# Patient Record
Sex: FEMALE | Race: WHITE | NOT HISPANIC OR LATINO | Employment: FULL TIME | URBAN - METROPOLITAN AREA
[De-identification: names, ages, dates, MRNs, and addresses within clinical notes are randomized per-mention and may not be internally consistent; named-entity substitution may affect disease eponyms.]

---

## 2018-05-24 ENCOUNTER — HOSPITAL ENCOUNTER (EMERGENCY)
Facility: HOSPITAL | Age: 28
Discharge: HOME | End: 2018-05-24
Attending: EMERGENCY MEDICINE | Admitting: EMERGENCY MEDICINE

## 2018-05-24 ENCOUNTER — APPOINTMENT (OUTPATIENT)
Dept: ULTRASOUND IMAGING | Facility: HOSPITAL | Age: 28
End: 2018-05-24

## 2018-05-24 ENCOUNTER — APPOINTMENT (OUTPATIENT)
Dept: CT IMAGING | Facility: HOSPITAL | Age: 28
End: 2018-05-24

## 2018-05-24 VITALS
OXYGEN SATURATION: 98 % | WEIGHT: 180 LBS | HEIGHT: 69 IN | HEART RATE: 74 BPM | DIASTOLIC BLOOD PRESSURE: 84 MMHG | SYSTOLIC BLOOD PRESSURE: 117 MMHG | RESPIRATION RATE: 16 BRPM | BODY MASS INDEX: 26.66 KG/M2 | TEMPERATURE: 98.2 F

## 2018-05-24 DIAGNOSIS — R93.5 ABNORMAL ULTRASOUND OF OVARY: Primary | ICD-10-CM

## 2018-05-24 DIAGNOSIS — R10.31 RIGHT LOWER QUADRANT ABDOMINAL PAIN: ICD-10-CM

## 2018-05-24 LAB
ALBUMIN SERPL-MCNC: 4.3 G/DL (ref 3.5–5.2)
ALBUMIN/GLOB SERPL: 1.3 G/DL
ALP SERPL-CCNC: 103 U/L (ref 39–117)
ALT SERPL W P-5'-P-CCNC: 17 U/L (ref 1–33)
ANION GAP SERPL CALCULATED.3IONS-SCNC: 13.7 MMOL/L
AST SERPL-CCNC: 20 U/L (ref 1–32)
BASOPHILS # BLD AUTO: 0.02 10*3/MM3 (ref 0–0.2)
BASOPHILS NFR BLD AUTO: 0.4 % (ref 0–1.5)
BILIRUB SERPL-MCNC: 0.2 MG/DL (ref 0.1–1.2)
BILIRUB UR QL STRIP: NEGATIVE
BUN BLD-MCNC: 13 MG/DL (ref 6–20)
BUN/CREAT SERPL: 16.3 (ref 7–25)
CALCIUM SPEC-SCNC: 9 MG/DL (ref 8.6–10.5)
CHLORIDE SERPL-SCNC: 101 MMOL/L (ref 98–107)
CLARITY UR: ABNORMAL
CLUE CELLS SPEC QL WET PREP: ABNORMAL
CO2 SERPL-SCNC: 26.3 MMOL/L (ref 22–29)
COLOR UR: YELLOW
CREAT BLD-MCNC: 0.8 MG/DL (ref 0.57–1)
DEPRECATED RDW RBC AUTO: 45.8 FL (ref 37–54)
EOSINOPHIL # BLD AUTO: 0.03 10*3/MM3 (ref 0–0.7)
EOSINOPHIL NFR BLD AUTO: 0.5 % (ref 0.3–6.2)
ERYTHROCYTE [DISTWIDTH] IN BLOOD BY AUTOMATED COUNT: 14.7 % (ref 11.7–13)
GFR SERPL CREATININE-BSD FRML MDRD: 85 ML/MIN/1.73
GLOBULIN UR ELPH-MCNC: 3.3 GM/DL
GLUCOSE BLD-MCNC: 106 MG/DL (ref 65–99)
GLUCOSE UR STRIP-MCNC: NEGATIVE MG/DL
HCG SERPL QL: NEGATIVE
HCT VFR BLD AUTO: 34.3 % (ref 35.6–45.5)
HGB BLD-MCNC: 10.3 G/DL (ref 11.9–15.5)
HGB UR QL STRIP.AUTO: NEGATIVE
HOLD SPECIMEN: NORMAL
HOLD SPECIMEN: NORMAL
HYDATID CYST SPEC WET PREP: ABNORMAL
IMM GRANULOCYTES # BLD: 0.02 10*3/MM3 (ref 0–0.03)
IMM GRANULOCYTES NFR BLD: 0.4 % (ref 0–0.5)
KETONES UR QL STRIP: NEGATIVE
KOH PREP NAIL: NORMAL
LEUKOCYTE ESTERASE UR QL STRIP.AUTO: NEGATIVE
LYMPHOCYTES # BLD AUTO: 0.64 10*3/MM3 (ref 0.9–4.8)
LYMPHOCYTES NFR BLD AUTO: 11.3 % (ref 19.6–45.3)
MCH RBC QN AUTO: 25.6 PG (ref 26.9–32)
MCHC RBC AUTO-ENTMCNC: 30 G/DL (ref 32.4–36.3)
MCV RBC AUTO: 85.3 FL (ref 80.5–98.2)
MONOCYTES # BLD AUTO: 0.24 10*3/MM3 (ref 0.2–1.2)
MONOCYTES NFR BLD AUTO: 4.2 % (ref 5–12)
NEUTROPHILS # BLD AUTO: 4.73 10*3/MM3 (ref 1.9–8.1)
NEUTROPHILS NFR BLD AUTO: 83.2 % (ref 42.7–76)
NITRITE UR QL STRIP: NEGATIVE
PH UR STRIP.AUTO: 5.5 [PH] (ref 5–8)
PLATELET # BLD AUTO: 298 10*3/MM3 (ref 140–500)
PMV BLD AUTO: 10.1 FL (ref 6–12)
POTASSIUM BLD-SCNC: 3.7 MMOL/L (ref 3.5–5.2)
PROT SERPL-MCNC: 7.6 G/DL (ref 6–8.5)
PROT UR QL STRIP: NEGATIVE
RBC # BLD AUTO: 4.02 10*6/MM3 (ref 3.9–5.2)
SODIUM BLD-SCNC: 141 MMOL/L (ref 136–145)
SP GR UR STRIP: 1.01 (ref 1–1.03)
T VAGINALIS SPEC QL WET PREP: ABNORMAL
UROBILINOGEN UR QL STRIP: ABNORMAL
WBC NRBC COR # BLD: 5.68 10*3/MM3 (ref 4.5–10.7)
WBC SPEC QL WET PREP: ABNORMAL
WHOLE BLOOD HOLD SPECIMEN: NORMAL
WHOLE BLOOD HOLD SPECIMEN: NORMAL
YEAST GENITAL QL WET PREP: ABNORMAL

## 2018-05-24 PROCEDURE — 76830 TRANSVAGINAL US NON-OB: CPT

## 2018-05-24 PROCEDURE — 76856 US EXAM PELVIC COMPLETE: CPT

## 2018-05-24 PROCEDURE — 87220 TISSUE EXAM FOR FUNGI: CPT | Performed by: NURSE PRACTITIONER

## 2018-05-24 PROCEDURE — 81003 URINALYSIS AUTO W/O SCOPE: CPT | Performed by: NURSE PRACTITIONER

## 2018-05-24 PROCEDURE — 25010000002 ONDANSETRON PER 1 MG: Performed by: NURSE PRACTITIONER

## 2018-05-24 PROCEDURE — 87491 CHLMYD TRACH DNA AMP PROBE: CPT | Performed by: NURSE PRACTITIONER

## 2018-05-24 PROCEDURE — 96375 TX/PRO/DX INJ NEW DRUG ADDON: CPT

## 2018-05-24 PROCEDURE — 80053 COMPREHEN METABOLIC PANEL: CPT | Performed by: EMERGENCY MEDICINE

## 2018-05-24 PROCEDURE — 96365 THER/PROPH/DIAG IV INF INIT: CPT

## 2018-05-24 PROCEDURE — 99285 EMERGENCY DEPT VISIT HI MDM: CPT

## 2018-05-24 PROCEDURE — 85025 COMPLETE CBC W/AUTO DIFF WBC: CPT | Performed by: EMERGENCY MEDICINE

## 2018-05-24 PROCEDURE — 93976 VASCULAR STUDY: CPT

## 2018-05-24 PROCEDURE — 87591 N.GONORRHOEAE DNA AMP PROB: CPT | Performed by: NURSE PRACTITIONER

## 2018-05-24 PROCEDURE — 25010000002 IOPAMIDOL 61 % SOLUTION: Performed by: NURSE PRACTITIONER

## 2018-05-24 PROCEDURE — 87210 SMEAR WET MOUNT SALINE/INK: CPT | Performed by: NURSE PRACTITIONER

## 2018-05-24 PROCEDURE — 25010000002 HYDROMORPHONE HCL PF 500 MG/50ML SOLUTION: Performed by: NURSE PRACTITIONER

## 2018-05-24 PROCEDURE — 25010000002 CEFTRIAXONE PER 250 MG: Performed by: NURSE PRACTITIONER

## 2018-05-24 PROCEDURE — 74177 CT ABD & PELVIS W/CONTRAST: CPT

## 2018-05-24 PROCEDURE — 96361 HYDRATE IV INFUSION ADD-ON: CPT

## 2018-05-24 PROCEDURE — 84703 CHORIONIC GONADOTROPIN ASSAY: CPT | Performed by: EMERGENCY MEDICINE

## 2018-05-24 RX ORDER — SODIUM CHLORIDE 0.9 % (FLUSH) 0.9 %
10 SYRINGE (ML) INJECTION AS NEEDED
Status: DISCONTINUED | OUTPATIENT
Start: 2018-05-24 | End: 2018-05-24 | Stop reason: HOSPADM

## 2018-05-24 RX ORDER — ALBUTEROL SULFATE 2.5 MG/3ML
2.5 SOLUTION RESPIRATORY (INHALATION) ONCE
Status: DISCONTINUED | OUTPATIENT
Start: 2018-05-24 | End: 2018-05-24 | Stop reason: HOSPADM

## 2018-05-24 RX ORDER — CEFTRIAXONE SODIUM 1 G/50ML
1 INJECTION, SOLUTION INTRAVENOUS ONCE
Status: COMPLETED | OUTPATIENT
Start: 2018-05-24 | End: 2018-05-24

## 2018-05-24 RX ORDER — AZITHROMYCIN 250 MG/1
1000 TABLET, FILM COATED ORAL ONCE
Status: COMPLETED | OUTPATIENT
Start: 2018-05-24 | End: 2018-05-24

## 2018-05-24 RX ORDER — ONDANSETRON 2 MG/ML
4 INJECTION INTRAMUSCULAR; INTRAVENOUS ONCE
Status: COMPLETED | OUTPATIENT
Start: 2018-05-24 | End: 2018-05-24

## 2018-05-24 RX ORDER — DOXYCYCLINE 100 MG/1
100 CAPSULE ORAL 2 TIMES DAILY
Qty: 28 CAPSULE | Refills: 0 | Status: SHIPPED | OUTPATIENT
Start: 2018-05-24 | End: 2018-06-07

## 2018-05-24 RX ADMIN — AZITHROMYCIN 1000 MG: 250 TABLET, FILM COATED ORAL at 14:28

## 2018-05-24 RX ADMIN — ONDANSETRON 4 MG: 2 INJECTION INTRAMUSCULAR; INTRAVENOUS at 11:32

## 2018-05-24 RX ADMIN — SODIUM CHLORIDE 1000 ML: 9 INJECTION, SOLUTION INTRAVENOUS at 11:32

## 2018-05-24 RX ADMIN — CEFTRIAXONE SODIUM 1 G: 1 INJECTION, SOLUTION INTRAVENOUS at 14:29

## 2018-05-24 RX ADMIN — IOPAMIDOL 85 ML: 612 INJECTION, SOLUTION INTRAVENOUS at 11:54

## 2018-05-24 RX ADMIN — HYDROMORPHONE HYDROCHLORIDE 1 MG: 10 INJECTION INTRAMUSCULAR; INTRAVENOUS; SUBCUTANEOUS at 11:33

## 2018-05-24 NOTE — ED PROVIDER NOTES
EMERGENCY DEPARTMENT ENCOUNTER    Room Number:  41/41  Date seen:  5/24/2018  Time seen: 10:28 AM  PCP: Chano Ortiz MD   Gastroenterologist- Dr Bowser   History provided by- patient    HPI:  Chief complaint: abdominal pain  Context:Doreen Stephenson is a 28 y.o. female who states that she has had Crohn's disease since age 19 yoa and underwent reversal of her ileostomy in 12/2017. She presents to the ED with c/o intermittent diffuse abd pain (worse to right abdomen) that started today. It has no aggravating factors and no alleviating factors. She has also had nausea. She denies vomiting, diarrhea, pain and difficulty with urination, fevers, chills, chest pain, and dyspnea. She reports that her sx are similar to previous exacerbations of Crohn's disease. Additionally, she states that her last bowel movement occurred last night and was normal. She notes that she is sexually active with a single partner and is not on contraceptive. She also has hx of abdominal hernia and SBO. LMP occurred in early 5/2018. Pt has no other complaints at this time.    Timing: intermittent  Duration: started today  Location: diffuse abdomen (worse to right abdomen)  Radiation: none  Quality: pain  Intensity/Severity: moderate  Associated Symptoms: nausea  Aggravating Factors: none  Alleviating Factors: none  Previous Episodes: Pt reports that her sx are similar to previous exacerbations of Crohn's disease.  Treatment before arrival: none mentioned    MEDICAL RECORD REVIEW  Pt sees Dr Bowser with [facility] for Crohn's disease.     ALLERGIES  Patient has no known allergies.    PAST MEDICAL HISTORY  Active Ambulatory Problems     Diagnosis Date Noted   • No Active Ambulatory Problems     Resolved Ambulatory Problems     Diagnosis Date Noted   • No Resolved Ambulatory Problems     Past Medical History:   Diagnosis Date   • Crohn's disease    • Depression    • Hyperlipidemia        PAST SURGICAL HISTORY  History  reviewed. No pertinent surgical history.    FAMILY HISTORY  History reviewed. No pertinent family history.    SOCIAL HISTORY  Social History     Social History   • Marital status: Single     Spouse name: N/A   • Number of children: N/A   • Years of education: N/A     Occupational History   • Not on file.     Social History Main Topics   • Smoking status: Never Smoker   • Smokeless tobacco: Never Used   • Alcohol use No   • Drug use: Unknown   • Sexual activity: Defer     Other Topics Concern   • Not on file     Social History Narrative   • No narrative on file       REVIEW OF SYSTEMS  Review of Systems   Constitutional: Negative for chills and fever.   HENT: Negative for congestion, rhinorrhea and sore throat.    Eyes: Negative for pain.   Respiratory: Negative for cough and shortness of breath.    Cardiovascular: Negative for chest pain and palpitations.   Gastrointestinal: Positive for abdominal pain (diffuse abd pain (worse to right abdomen)) and nausea. Negative for diarrhea and vomiting.   Genitourinary: Negative for difficulty urinating and dysuria.   Musculoskeletal: Negative for myalgias.   Skin: Negative.    Neurological: Negative for speech difficulty, weakness, numbness and headaches.   Psychiatric/Behavioral: Negative.        PHYSICAL EXAM  ED Triage Vitals   Temp Heart Rate Resp BP SpO2   05/24/18 0947 05/24/18 0947 05/24/18 0955 05/24/18 0955 05/24/18 0947   98.2 °F (36.8 °C) 120 16 136/91 100 % WNL      Temp src Heart Rate Source Patient Position BP Location FiO2 (%)   -- -- 05/24/18 0955 -- --     Lying       Physical Exam   Constitutional: She is oriented to person, place, and time. No distress.   HENT:   Head: Normocephalic.   Mouth/Throat: Mucous membranes are normal.   Eyes: EOM are normal. Pupils are equal, round, and reactive to light.   Neck: Normal range of motion. Neck supple.   Cardiovascular: Normal rate, regular rhythm and normal heart sounds.    Pulmonary/Chest: Effort normal and breath  sounds normal. No respiratory distress. She has no decreased breath sounds. She has no wheezes. She has no rhonchi. She has no rales.   Abdominal: Soft. Bowel sounds are normal. There is tenderness in the right upper quadrant. There is no rebound and no guarding.   Well healed/scarred midline abdominal incision, healed right mid abdomen ileostomy reversal site   Genitourinary: Left adnexa normal and vulva normal. Cervix exhibits no motion tenderness. Right adnexum displays tenderness. Odorless  brown and vaginal discharge found.   Genitourinary Comments: Tech was at pt's bedside during pelvic exam.  Small amount stringy yellow discharge from cervix with some slight bleeding after swab used for specimen.  Also, brownish/green discharge noted in vaginal vault.     Musculoskeletal: Normal range of motion.   Neurological: She is alert and oriented to person, place, and time.   Skin: Skin is warm and dry.   Psychiatric: Mood and affect normal.   Nursing note and vitals reviewed.      LAB RESULTS  Recent Results (from the past 24 hour(s))   Light Blue Top    Collection Time: 05/24/18 10:32 AM   Result Value Ref Range    Extra Tube hold for add-on    Green Top (Gel)    Collection Time: 05/24/18 10:32 AM   Result Value Ref Range    Extra Tube Hold for add-ons.    Lavender Top    Collection Time: 05/24/18 10:32 AM   Result Value Ref Range    Extra Tube hold for add-on    Gold Top - SST    Collection Time: 05/24/18 10:32 AM   Result Value Ref Range    Extra Tube Hold for add-ons.    CBC Auto Differential    Collection Time: 05/24/18 10:32 AM   Result Value Ref Range    WBC 5.68 4.50 - 10.70 10*3/mm3    RBC 4.02 3.90 - 5.20 10*6/mm3    Hemoglobin 10.3 (L) 11.9 - 15.5 g/dL    Hematocrit 34.3 (L) 35.6 - 45.5 %    MCV 85.3 80.5 - 98.2 fL    MCH 25.6 (L) 26.9 - 32.0 pg    MCHC 30.0 (L) 32.4 - 36.3 g/dL    RDW 14.7 (H) 11.7 - 13.0 %    RDW-SD 45.8 37.0 - 54.0 fl    MPV 10.1 6.0 - 12.0 fL    Platelets 298 140 - 500 10*3/mm3     Neutrophil % 83.2 (H) 42.7 - 76.0 %    Lymphocyte % 11.3 (L) 19.6 - 45.3 %    Monocyte % 4.2 (L) 5.0 - 12.0 %    Eosinophil % 0.5 0.3 - 6.2 %    Basophil % 0.4 0.0 - 1.5 %    Immature Grans % 0.4 0.0 - 0.5 %    Neutrophils, Absolute 4.73 1.90 - 8.10 10*3/mm3    Lymphocytes, Absolute 0.64 (L) 0.90 - 4.80 10*3/mm3    Monocytes, Absolute 0.24 0.20 - 1.20 10*3/mm3    Eosinophils, Absolute 0.03 0.00 - 0.70 10*3/mm3    Basophils, Absolute 0.02 0.00 - 0.20 10*3/mm3    Immature Grans, Absolute 0.02 0.00 - 0.03 10*3/mm3   Comprehensive Metabolic Panel    Collection Time: 05/24/18 10:32 AM   Result Value Ref Range    Glucose 106 (H) 65 - 99 mg/dL    BUN 13 6 - 20 mg/dL    Creatinine 0.80 0.57 - 1.00 mg/dL    Sodium 141 136 - 145 mmol/L    Potassium 3.7 3.5 - 5.2 mmol/L    Chloride 101 98 - 107 mmol/L    CO2 26.3 22.0 - 29.0 mmol/L    Calcium 9.0 8.6 - 10.5 mg/dL    Total Protein 7.6 6.0 - 8.5 g/dL    Albumin 4.30 3.50 - 5.20 g/dL    ALT (SGPT) 17 1 - 33 U/L    AST (SGOT) 20 1 - 32 U/L    Alkaline Phosphatase 103 39 - 117 U/L    Total Bilirubin 0.2 0.1 - 1.2 mg/dL    eGFR Non African Amer 85 >60 mL/min/1.73    Globulin 3.3 gm/dL    A/G Ratio 1.3 g/dL    BUN/Creatinine Ratio 16.3 7.0 - 25.0    Anion Gap 13.7 mmol/L   hCG, Serum, Qualitative    Collection Time: 05/24/18 10:32 AM   Result Value Ref Range    HCG Qualitative Negative Indeterminate, Negative   Urinalysis With / Microscopic If Indicated (No Culture) - Urine, Clean Catch    Collection Time: 05/24/18 11:37 AM   Result Value Ref Range    Color, UA Yellow Yellow, Straw    Appearance, UA Cloudy (A) Clear    pH, UA 5.5 5.0 - 8.0    Specific Gravity, UA 1.010 1.005 - 1.030    Glucose, UA Negative Negative    Ketones, UA Negative Negative    Bilirubin, UA Negative Negative    Blood, UA Negative Negative    Protein, UA Negative Negative    Leuk Esterase, UA Negative Negative    Nitrite, UA Negative Negative    Urobilinogen, UA 0.2 E.U./dL 0.2 - 1.0 E.U./dL   KOH Prep - Swab,  Vagina    Collection Time: 05/24/18  1:01 PM   Result Value Ref Range    KOH Prep No yeast or hyphal elements seen No yeast or hyphal elements seen   Wet Prep, Genital - Swab, Vagina    Collection Time: 05/24/18  1:01 PM   Result Value Ref Range    YEAST No yeast seen No yeast seen    HYPHAL ELEMENTS No Hyphal elements seen No Hyphal elements seen    WBC'S 1+ WBC's seen (A) No WBC's seen    Clue Cells, Wet Prep No Clue cells seen No Clue cells seen    Trichomonas, Wet Prep No Trichomonas seen No Trichomonas seen       I ordered the above labs and reviewed the results    RADIOLOGY  CT Abdomen Pelvis With Contrast (Preliminary result)   Result time 05/24/18 13:06:11   Preliminary result by Ophelia Cunningham MD (05/24/18 13:06:11)                Impression:    1. There is no acute bowel abnormality to suggest acute exacerbation of  inflammatory bowel disease.  2. The fluid-filled structure in the right adnexal region is of  uncertain etiology, but may represent hydrosalpinx. The right ovary is  high within the pelvis and characterization may be limited, but pelvic  ultrasound should be considered.  3. Small right lateral abdominal wall hernia containing fat, likely  trocar site related.     Discussed with JET Appiah.               Narrative:    CT ABDOMEN AND PELVIS WITH IV CONTRAST     HISTORY: 28-year-old female with abdominal pain. History of bowel  resection for Crohn's disease in the past.     TECHNIQUE: Radiation dose reduction techniques were utilized, including  automated exposure control and exposure modulation based on body size.   3 mm images were obtained through the abdomen and pelvis after the  administration of IV contrast. Compared with previous CT from  11/10/2015.     FINDINGS: The liver, gallbladder, spleen, pancreas, adrenals, and  kidneys appear unremarkable. No acute bowel abnormality is seen.  Ileocolonic anastomosis appears unremarkable. There are shotty nodes  within the mesentery adjacently  which are likely reactive. There is a  small lateral ventral hernia, lateral to the right rectus muscle belly  with a small amount of herniated fat, image 114. There is a subcutaneous  scar in this region, likely secondary to a trocar site. There is  surgical scarring along a midline abdominal wall incision. The uterus  and left adnexa appear unremarkable. There is a tubular fluid-filled  structure which becomes bulbous and rounded. There is a small amount of  free fluid within the cul-de-sac on the right. There is no  lymphadenopathy.                  US Pelvis Complete (Preliminary result)   Result time 05/24/18 16:05:47   Preliminary result by Aren Santizo MD (05/24/18 16:05:47)                Impression:    There is some fluid in the right pelvis around a  normal-appearing right ovary. This could represent loculated peritoneal  fluid as with a peritoneal inclusion cyst. Hydrosalpinx is a  differential consideration although in my opinion the morphology does  not appear consistent with that entity. There is no evidence of ovarian  torsion nor is any solid tumor lesion identified.     I discussed the case with Latisha Sánchez of the emergency department at  2:49 PM.               Narrative:    TRANSABDOMINAL AND TRANSVAGINAL PELVIC SONOGRAM WITH OVARIAN DOPPLER     HISTORY: Crohn's disease. Multiple prior abdominal surgeries. Abdominal  cramping. Fluid was observed in the right lower abdomen and pelvis  around the ovary on CT earlier today.     TECHNIQUE: Sonogram of the pelvis was performed in standard fashion.  This includes ovarian Doppler evaluation. The exam is correlated with CT  scan performed earlier today.     FINDINGS: The uterus measures 8.7 x 3.2 x 4.5 cm and appears normal. The  myometrium appears normal. The endometrial bilayer measures under 4 mm.     The ovaries both demonstrate normal arterial and venous blood flow on  Doppler interrogation. The right ovary measures 3.2 x 2.2 x 2.7 cm. The  left  ovary measures 3.2 x 1.7 x 2.1 cm. There is fluid in the right  pelvis adjacent to the ovary. On both ultrasound and CT, this appears to  me to be some loculated fluid and I do not convincingly identify a  dilated, fluid-filled fallopian tube. No echogenic material is observed  in the fluid nor is there any septation or mass.                   I ordered the above noted radiological studies and reviewed the images on the PACS system.  Spoke with Dr. Cunningham (radiologist) regarding CT abd/pel scan results and Dr Santizo (radiologist) regarding pelvic ultrasound results      MEDICATIONS GIVEN IN ER  Medications   sodium chloride 0.9 % flush 10 mL (not administered)   sodium chloride 0.9 % flush 10 mL (not administered)   sodium chloride 0.9 % bolus 1,000 mL (0 mL Intravenous Stopped 5/24/18 1232)   ondansetron (ZOFRAN) injection 4 mg (4 mg Intravenous Given 5/24/18 1132)   HYDROmorphone (DILAUDID) injection 1 mg (1 mg Intravenous Given 5/24/18 1133)   iopamidol (ISOVUE-300) 61 % injection 100 mL (85 mL Intravenous Given 5/24/18 1154)   cefTRIAXone (ROCEPHIN) IVPB 1 g (1 g Intravenous New Bag 5/24/18 1429)   azithromycin (ZITHROMAX) tablet 1,000 mg (1,000 mg Oral Given 5/24/18 1428)         PROCEDURES  Procedures    COURSE & MEDICAL DECISION MAKING  Pertinent Labs and Imaging studies that were ordered and reviewed are noted above.  Results were reviewed/discussed with the patient and they were also made aware of online access.  Pt also made aware that some labs, such as cultures, will not be resulted during ER visit and follow up with PMD is necessary.     PROGRESS AND CONSULTS    Progress Notes:       1044- Ordered IV fluids for hydration, zofran for nausea, and dilaudid for pain. Ordered CT abd/pel, blood work, and UA for further evaluation.     1213- Obtained CT abd/pel results-> no acute bowel process, no acute Crohn's disease abnormality, right adnexa has fluid filled tubular structure (concern for right  "hydrosalpinx), right ovary is high within pelvis.     1215- Rechecked pt. She is resting comfortably and is in no acute distress. She states that her pain has improved after ED treatment. Discussed with pt about all pertinent results including normal WBC count, normal electrolytes, unremarkable UA, and CT abd/pel findings (no acute bowel process, no acute Crohn's disease abnormality, right adnexa has fluid filled tubular structure (concern for right hydrosalpinx), right ovary is high within pelvis). Informed pt of plan to perform pelvic exam to further evaluate her sx/CT abd/pel findings. Pt verbalizes understanding and agreement with plan.     1257- Performed pelvic exam with tech at pt's bedside (see physical exam for details). Notified pt that STD/PID could potentially be contributing to her sx based on pelvic exam findings and that we can treat her for it. Pt agrees to be treated for potential STD/PID.     1304- Ordered KOH prep, chlamydia/gonorrhea PCR, genital wet prep, and pelvic ultrasound for further evaluation.    1345- Reviewed pt's history and workup with Dr. Barrios.  After a bedside evaluation, Dr. Barrios agrees with the plan of care.    1419- Ordered rocephin to cover for gonorrhea and azithromycin to cover for chlamydia. Will prescribe doxycycline to cover for PID.    1438- Per Dr Barrios, pt states that she underwent CT abd/pel within the past 2 months at [facility] that showed \"a cyst on right ovary or something\". Will attempt to obtain records from [facility].     1448- Obtained pelvic ultrasound results-> loculated fluid collection around or adjacent to the right ovary.      1510- Rechecked pt. She is resting comfortably and is in no acute distress. Discussed with pt about pelvic ultrasound findings (loculated fluid collection around or adjacent to the right ovary). Notified pt that chlamydia/gonorrhea PCR results are pending and will not return during her ED visit but that she will " "be informed about their results within the next few days if they are abnormal. Informed pt of plan to prescribe 14 day dose of doxycycline to cover for PID. Provided reassurance. Informed pt of dx and plan for discharge. Instructed pt to f/u closely with referred OBGYN for recheck of condition and abnormal pelvic ultrasound findings, for further management, and for repeat pelvic ultrasound (d/w pt about the importance of this). RTER warnings given. Pt understands and agrees with plan. Addressed all questions.    The patient's history, physical exam, and lab findings were discussed with the physician, who also performed a face to face history and physical exam.  I discussed all results and noted any abnormalities with patient.  Discussed absoute need to recheck abnormalities with their family physician.  I answered any of the patient's questions.  Discussed plan for discharge, as there is no emergent indication for admission.  Pt is agreeable and understands need for follow up and repeat testing.  Pt is aware that discharge does not mean that nothing is wrong but it indicates no emergency is present and they must continue care with their family physician.  Pt is discharged with instructions to follow up with primary care doctor to have their blood pressure rechecked.       Disposition vitals:  /86   Pulse 67   Temp 98.2 °F (36.8 °C)   Resp 16   Ht 175.3 cm (69\")   Wt 81.6 kg (180 lb)   SpO2 98%   BMI 26.58 kg/m²       DIAGNOSIS  Final diagnoses:   Abnormal ultrasound of ovary   Right lower quadrant abdominal pain       FOLLOW UP   Sarah Chavira MD  [address] [facility] [id number] [phone]    Schedule an appointment as soon as possible for a visit   for repeat ultrasound      RX     Medication List      New Prescriptions    doxycycline 100 MG capsule  Commonly known as:  MONODOX  Take 1 capsule by mouth 2 (Two) Times a Day for 14 days.            Documentation assistance provided by " scribe Sergio Bustamante for JET Appiah.  Information recorded by the scribe was done at my direction and has been verified and validated by me.  Electronically signed by Sergio Bustamante on 5/24/2018 at time 3:21 PM     Sergio Bustamante  05/24/18 1618       JET Maya  05/24/18 [id number]

## 2018-05-24 NOTE — ED PROVIDER NOTES
MD ATTESTATION NOTE    The ZOLTAN and I have discussed this patient's history, physical exam, and treatment plan.  I have reviewed the documentation and personally had a face to face interaction with the patient. I affirm the documentation and agree with the treatment and plan.  The attached note describes my personal findings.    Pt, with a h/x of crohn's disease, presents with central abdominal pain that began earlier. She states that her symptoms are similar to her last Crohn's exacerbation. Pt denies diarrhea. She takes mercaptopurine and endicort for her Crohn's disease. Informed Pt that her pelvic exam was a slight concerning for an STD (preformed by Latisha) and that we have sent the sample for culture but that we will treat anyway just to be cautious. Discussed Pt's CT results and instructed her to follow up with the Gyn because she has a known cyst on her right ovary from her last CT scan at [facility]. She has no bowel obstruction or inflammation CT. Discussed plans to review her CT results from [facility] and determine disposition.  I have reviewed her CT abd/pelvis, labs, and US done at here.  This fluid collection and does not look infectious and is in the very similar location as where she had a reversal of ileostomy.  I cleaned something that is chronic and is not acute or emergent.  We will have her follow-up with her gynecologist    Physical exam: Looks well in no distress  Constitutional: NAD  Abdomen: Mild subjective suprapubic tenderness, no guarding or rebound  Lungs: CTAB, O2 sats are 100%  Heart: RRR       Nuris Meadows  05/24/18 1438       Jose Barrios MD  05/24/18 1820

## 2018-05-25 LAB
C TRACH RRNA SPEC DONR QL NAA+PROBE: NEGATIVE
N GONORRHOEA DNA SPEC QL NAA+PROBE: NEGATIVE

## 2018-08-02 ENCOUNTER — OFFICE VISIT (OUTPATIENT)
Dept: SURGERY | Facility: CLINIC | Age: 28
End: 2018-08-02

## 2018-08-02 VITALS
OXYGEN SATURATION: 98 % | HEIGHT: 68 IN | DIASTOLIC BLOOD PRESSURE: 80 MMHG | RESPIRATION RATE: 19 BRPM | WEIGHT: 213.85 LBS | SYSTOLIC BLOOD PRESSURE: 110 MMHG | HEART RATE: 87 BPM | BODY MASS INDEX: 32.41 KG/M2

## 2018-08-02 DIAGNOSIS — K43.9 VENTRAL HERNIA WITHOUT OBSTRUCTION OR GANGRENE: Primary | ICD-10-CM

## 2018-08-02 PROCEDURE — 99243 OFF/OP CNSLTJ NEW/EST LOW 30: CPT | Performed by: SURGERY

## 2018-08-02 RX ORDER — SERTRALINE HYDROCHLORIDE 100 MG/1
100 TABLET, FILM COATED ORAL DAILY
COMMUNITY
End: 2021-01-18

## 2018-08-02 RX ORDER — MONTELUKAST SODIUM 4 MG/1
TABLET, CHEWABLE ORAL
COMMUNITY
Start: 2018-07-02 | End: 2018-11-06 | Stop reason: DRUGHIGH

## 2018-08-02 RX ORDER — CEFAZOLIN SODIUM 2 G/100ML
2 INJECTION, SOLUTION INTRAVENOUS ONCE
Status: CANCELLED | OUTPATIENT
Start: 2018-09-10 | End: 2018-09-10

## 2018-08-02 RX ORDER — MERCAPTOPURINE 50 MG/1
TABLET ORAL
Status: ON HOLD | COMMUNITY
Start: 2018-06-20 | End: 2018-09-10

## 2018-08-02 NOTE — PROGRESS NOTES
Subjective   Doreen Stephenson is a 28 y.o. female who presents to the office in surgical consultation from Chano Ortiz MD and Nyasia Sol MD for a ventral hernia.    History of Present Illness     The patient has a history of Crohn's disease and underwent an ileocolic resection with primary anastomosis.  She had an anastomotic leak that required a reoperation with resection of the anastomosis and ileostomy.  She then had an ileostomy takedown.  She is currently on medical management of Crohn's disease with adequate control of her symptoms.  She has developed pain and a bulge just to the right of the umbilicus in the midline incision and in the lateral margin of the previous ileostomy site incision.  These areas are frequently symptomatic.  She has never had incarceration.  Her bowel function has remained at her baseline.    Review of Systems   Constitutional: Negative for activity change, appetite change, fatigue and fever.   HENT: Negative for trouble swallowing and voice change.    Respiratory: Negative for chest tightness and shortness of breath.    Cardiovascular: Negative for chest pain and palpitations.   Gastrointestinal: Positive for abdominal pain. Negative for blood in stool, constipation, diarrhea, nausea and vomiting.   Endocrine: Negative for cold intolerance and heat intolerance.   Genitourinary: Negative for dysuria and flank pain.   Neurological: Negative for dizziness and light-headedness.   Hematological: Negative for adenopathy. Does not bruise/bleed easily.   Psychiatric/Behavioral: Negative for agitation and confusion.     Past Medical History:   Diagnosis Date   • Anemia    • Anxiety    • Crohn's disease (CMS/HCC)    • Depression    • Hyperlipidemia      Past Surgical History:   Procedure Laterality Date   • COLON RESECTION SMALL BOWEL N/A 08/31/2017    Dr. Rockwell   • COLON RESECTION WITH COLOSTOMY N/A 09/04/2017    Due to anastomosis infection-Dr. Rockwell   • COLONOSCOPY N/A 2016     Dr. Bowser   • ILEOSTOMY CLOSURE N/A 12/12/2017    Dr. Rockwell     History reviewed. No pertinent family history.  Social History     Social History   • Marital status: Single     Spouse name: N/A   • Number of children: N/A   • Years of education: N/A     Occupational History   • Not on file.     Social History Main Topics   • Smoking status: Never Smoker   • Smokeless tobacco: Never Used   • Alcohol use No   • Drug use: Unknown   • Sexual activity: Defer     Other Topics Concern   • Not on file     Social History Narrative   • No narrative on file       Objective   Physical Exam   Constitutional: She is oriented to person, place, and time. She appears well-developed and well-nourished.  Non-toxic appearance.   HENT:   Head: Normocephalic and atraumatic.   Eyes: EOM are normal. No scleral icterus.   Neck: Normal range of motion. Neck supple.   Pulmonary/Chest: Effort normal. No respiratory distress.   Abdominal: Soft. Normal appearance. There is no tenderness. A hernia is present. Hernia confirmed positive in the ventral area (Moderately sized area of abdominal wall laxity or ventral hernia in the midline along the right side of the umbilicus and a small reducible ventral hernia at the previous ileostomy site).   Neurological: She is alert and oriented to person, place, and time.   Skin: Skin is warm and dry.   Psychiatric: She has a normal mood and affect. Her behavior is normal. Judgment and thought content normal.       Assessment/Plan       The encounter diagnosis was Ventral hernia without obstruction or gangrene.    The patient has 2 symptomatic ventral hernias.  She has been scheduled for a ventral hernia repair.  She understands that the ventral hernia in the midline will likely require component separation and mesh.  The patient understands the indications, alternatives, risks, and benefits of the procedure and wishes to proceed.

## 2018-08-10 PROBLEM — K43.9 VENTRAL HERNIA WITHOUT OBSTRUCTION OR GANGRENE: Status: ACTIVE | Noted: 2018-08-10

## 2018-09-10 ENCOUNTER — ANESTHESIA EVENT (OUTPATIENT)
Dept: PERIOP | Facility: HOSPITAL | Age: 28
End: 2018-09-10

## 2018-09-10 ENCOUNTER — ANESTHESIA (OUTPATIENT)
Dept: PERIOP | Facility: HOSPITAL | Age: 28
End: 2018-09-10

## 2018-09-10 ENCOUNTER — HOSPITAL ENCOUNTER (OUTPATIENT)
Facility: HOSPITAL | Age: 28
Setting detail: OBSERVATION
Discharge: HOME | End: 2018-09-12
Attending: SURGERY | Admitting: SURGERY

## 2018-09-10 DIAGNOSIS — K43.9 VENTRAL HERNIA WITHOUT OBSTRUCTION OR GANGRENE: ICD-10-CM

## 2018-09-10 LAB
B-HCG UR QL: NEGATIVE
BASOPHILS # BLD AUTO: 0.02 10*3/MM3 (ref 0–0.2)
BASOPHILS NFR BLD AUTO: 0.3 % (ref 0–1.5)
DEPRECATED RDW RBC AUTO: 46.9 FL (ref 37–54)
EOSINOPHIL # BLD AUTO: 0.04 10*3/MM3 (ref 0–0.7)
EOSINOPHIL NFR BLD AUTO: 0.6 % (ref 0.3–6.2)
ERYTHROCYTE [DISTWIDTH] IN BLOOD BY AUTOMATED COUNT: 16.4 % (ref 11.7–13)
HCT VFR BLD AUTO: 37.3 % (ref 35.6–45.5)
HGB BLD-MCNC: 11.1 G/DL (ref 11.9–15.5)
IMM GRANULOCYTES # BLD: 0 10*3/MM3 (ref 0–0.03)
IMM GRANULOCYTES NFR BLD: 0 % (ref 0–0.5)
INTERNAL NEGATIVE CONTROL: NEGATIVE
INTERNAL POSITIVE CONTROL: POSITIVE
LYMPHOCYTES # BLD AUTO: 1.05 10*3/MM3 (ref 0.9–4.8)
LYMPHOCYTES NFR BLD AUTO: 15.9 % (ref 19.6–45.3)
Lab: NORMAL
MCH RBC QN AUTO: 23.6 PG (ref 26.9–32)
MCHC RBC AUTO-ENTMCNC: 29.8 G/DL (ref 32.4–36.3)
MCV RBC AUTO: 79.2 FL (ref 80.5–98.2)
MONOCYTES # BLD AUTO: 0.56 10*3/MM3 (ref 0.2–1.2)
MONOCYTES NFR BLD AUTO: 8.5 % (ref 5–12)
NEUTROPHILS # BLD AUTO: 4.95 10*3/MM3 (ref 1.9–8.1)
NEUTROPHILS NFR BLD AUTO: 74.7 % (ref 42.7–76)
PLATELET # BLD AUTO: 280 10*3/MM3 (ref 140–500)
PMV BLD AUTO: 10 FL (ref 6–12)
RBC # BLD AUTO: 4.71 10*6/MM3 (ref 3.9–5.2)
WBC NRBC COR # BLD: 6.62 10*3/MM3 (ref 4.5–10.7)

## 2018-09-10 PROCEDURE — 25010000002 SUCCINYLCHOLINE PER 20 MG: Performed by: NURSE ANESTHETIST, CERTIFIED REGISTERED

## 2018-09-10 PROCEDURE — 25010000002 KETOROLAC TROMETHAMINE PER 15 MG: Performed by: ANESTHESIOLOGY

## 2018-09-10 PROCEDURE — 15734 MUSCLE-SKIN GRAFT TRUNK: CPT | Performed by: SURGERY

## 2018-09-10 PROCEDURE — G0378 HOSPITAL OBSERVATION PER HR: HCPCS

## 2018-09-10 PROCEDURE — 49568 PR IMPLANT MESH HERNIA REPAIR/DEBRIDEMENT CLOSURE: CPT | Performed by: SURGERY

## 2018-09-10 PROCEDURE — 25010000003 CEFAZOLIN IN DEXTROSE 2-4 GM/100ML-% SOLUTION: Performed by: SURGERY

## 2018-09-10 PROCEDURE — 49560 PR REPAIR INCISIONAL HERNIA,REDUCIBLE: CPT | Performed by: SURGERY

## 2018-09-10 PROCEDURE — 25010000002 PROMETHAZINE PER 50 MG: Performed by: NURSE ANESTHETIST, CERTIFIED REGISTERED

## 2018-09-10 PROCEDURE — 81025 URINE PREGNANCY TEST: CPT | Performed by: SURGERY

## 2018-09-10 PROCEDURE — C1781 MESH (IMPLANTABLE): HCPCS | Performed by: SURGERY

## 2018-09-10 PROCEDURE — 25010000002 MIDAZOLAM PER 1 MG: Performed by: ANESTHESIOLOGY

## 2018-09-10 PROCEDURE — 25010000002 FENTANYL CITRATE (PF) 100 MCG/2ML SOLUTION: Performed by: NURSE ANESTHETIST, CERTIFIED REGISTERED

## 2018-09-10 PROCEDURE — 25010000002 HYDROMORPHONE PER 4 MG: Performed by: NURSE ANESTHETIST, CERTIFIED REGISTERED

## 2018-09-10 PROCEDURE — S0260 H&P FOR SURGERY: HCPCS | Performed by: SURGERY

## 2018-09-10 PROCEDURE — 63710000001 DIPHENHYDRAMINE PER 50 MG: Performed by: SURGERY

## 2018-09-10 PROCEDURE — 25010000002 DEXAMETHASONE PER 1 MG: Performed by: NURSE ANESTHETIST, CERTIFIED REGISTERED

## 2018-09-10 PROCEDURE — 25010000002 HYDROMORPHONE PER 4 MG: Performed by: SURGERY

## 2018-09-10 PROCEDURE — 25010000002 PROPOFOL 10 MG/ML EMULSION: Performed by: NURSE ANESTHETIST, CERTIFIED REGISTERED

## 2018-09-10 PROCEDURE — 25010000002 ONDANSETRON PER 1 MG: Performed by: ANESTHESIOLOGY

## 2018-09-10 PROCEDURE — 85025 COMPLETE CBC W/AUTO DIFF WBC: CPT | Performed by: SURGERY

## 2018-09-10 DEVICE — BARD MESH
Type: IMPLANTABLE DEVICE | Site: ABDOMEN | Status: FUNCTIONAL
Brand: BARD MESH

## 2018-09-10 RX ORDER — ONDANSETRON 4 MG/1
4 TABLET, ORALLY DISINTEGRATING ORAL EVERY 6 HOURS PRN
Status: DISCONTINUED | OUTPATIENT
Start: 2018-09-10 | End: 2018-09-12 | Stop reason: HOSPADM

## 2018-09-10 RX ORDER — LIDOCAINE HYDROCHLORIDE 40 MG/ML
SOLUTION TOPICAL AS NEEDED
Status: DISCONTINUED | OUTPATIENT
Start: 2018-09-10 | End: 2018-09-10 | Stop reason: SURG

## 2018-09-10 RX ORDER — MONTELUKAST SODIUM 4 MG/1
1 TABLET, CHEWABLE ORAL DAILY
COMMUNITY
End: 2019-01-08

## 2018-09-10 RX ORDER — MERCAPTOPURINE 50 MG/1
50 TABLET ORAL DAILY
Status: DISCONTINUED | OUTPATIENT
Start: 2018-09-10 | End: 2018-09-12 | Stop reason: HOSPADM

## 2018-09-10 RX ORDER — MIDAZOLAM HYDROCHLORIDE 1 MG/ML
1 INJECTION INTRAMUSCULAR; INTRAVENOUS
Status: DISCONTINUED | OUTPATIENT
Start: 2018-09-10 | End: 2018-09-10

## 2018-09-10 RX ORDER — DIPHENHYDRAMINE HCL 25 MG
50 CAPSULE ORAL EVERY 4 HOURS PRN
Status: DISCONTINUED | OUTPATIENT
Start: 2018-09-10 | End: 2018-09-12 | Stop reason: HOSPADM

## 2018-09-10 RX ORDER — SODIUM CHLORIDE, SODIUM LACTATE, POTASSIUM CHLORIDE, CALCIUM CHLORIDE 600; 310; 30; 20 MG/100ML; MG/100ML; MG/100ML; MG/100ML
75 INJECTION, SOLUTION INTRAVENOUS CONTINUOUS
Status: DISCONTINUED | OUTPATIENT
Start: 2018-09-10 | End: 2018-09-11

## 2018-09-10 RX ORDER — NALOXONE HCL 0.4 MG/ML
0.2 VIAL (ML) INJECTION AS NEEDED
Status: DISCONTINUED | OUTPATIENT
Start: 2018-09-10 | End: 2018-09-10

## 2018-09-10 RX ORDER — SUCCINYLCHOLINE CHLORIDE 20 MG/ML
INJECTION INTRAMUSCULAR; INTRAVENOUS AS NEEDED
Status: DISCONTINUED | OUTPATIENT
Start: 2018-09-10 | End: 2018-09-10 | Stop reason: SURG

## 2018-09-10 RX ORDER — SODIUM CHLORIDE, SODIUM LACTATE, POTASSIUM CHLORIDE, CALCIUM CHLORIDE 600; 310; 30; 20 MG/100ML; MG/100ML; MG/100ML; MG/100ML
100 INJECTION, SOLUTION INTRAVENOUS CONTINUOUS
Status: DISCONTINUED | OUTPATIENT
Start: 2018-09-10 | End: 2018-09-11

## 2018-09-10 RX ORDER — EPHEDRINE SULFATE 50 MG/ML
5 INJECTION, SOLUTION INTRAVENOUS ONCE AS NEEDED
Status: DISCONTINUED | OUTPATIENT
Start: 2018-09-10 | End: 2018-09-10

## 2018-09-10 RX ORDER — PROMETHAZINE HYDROCHLORIDE 25 MG/1
25 TABLET ORAL ONCE AS NEEDED
Status: DISCONTINUED | OUTPATIENT
Start: 2018-09-10 | End: 2018-09-10

## 2018-09-10 RX ORDER — FENTANYL CITRATE 50 UG/ML
50 INJECTION, SOLUTION INTRAMUSCULAR; INTRAVENOUS
Status: DISCONTINUED | OUTPATIENT
Start: 2018-09-10 | End: 2018-09-10

## 2018-09-10 RX ORDER — ONDANSETRON 2 MG/ML
INJECTION INTRAMUSCULAR; INTRAVENOUS AS NEEDED
Status: DISCONTINUED | OUTPATIENT
Start: 2018-09-10 | End: 2018-09-10 | Stop reason: SURG

## 2018-09-10 RX ORDER — SODIUM CHLORIDE 0.9 % (FLUSH) 0.9 %
1-10 SYRINGE (ML) INJECTION AS NEEDED
Status: DISCONTINUED | OUTPATIENT
Start: 2018-09-10 | End: 2018-09-10 | Stop reason: HOSPADM

## 2018-09-10 RX ORDER — LIDOCAINE HYDROCHLORIDE 10 MG/ML
0.5 INJECTION, SOLUTION EPIDURAL; INFILTRATION; INTRACAUDAL; PERINEURAL ONCE AS NEEDED
Status: DISCONTINUED | OUTPATIENT
Start: 2018-09-10 | End: 2018-09-10 | Stop reason: HOSPADM

## 2018-09-10 RX ORDER — ROCURONIUM BROMIDE 10 MG/ML
INJECTION, SOLUTION INTRAVENOUS AS NEEDED
Status: DISCONTINUED | OUTPATIENT
Start: 2018-09-10 | End: 2018-09-10 | Stop reason: SURG

## 2018-09-10 RX ORDER — SCOLOPAMINE TRANSDERMAL SYSTEM 1 MG/1
1 PATCH, EXTENDED RELEASE TRANSDERMAL ONCE
Status: DISCONTINUED | OUTPATIENT
Start: 2018-09-10 | End: 2018-09-11

## 2018-09-10 RX ORDER — MIDAZOLAM HYDROCHLORIDE 1 MG/ML
1 INJECTION INTRAMUSCULAR; INTRAVENOUS
Status: DISCONTINUED | OUTPATIENT
Start: 2018-09-10 | End: 2018-09-10 | Stop reason: HOSPADM

## 2018-09-10 RX ORDER — PROMETHAZINE HYDROCHLORIDE 25 MG/1
25 SUPPOSITORY RECTAL ONCE AS NEEDED
Status: DISCONTINUED | OUTPATIENT
Start: 2018-09-10 | End: 2018-09-10

## 2018-09-10 RX ORDER — PROMETHAZINE HYDROCHLORIDE 25 MG/1
12.5 TABLET ORAL ONCE AS NEEDED
Status: DISCONTINUED | OUTPATIENT
Start: 2018-09-10 | End: 2018-09-10

## 2018-09-10 RX ORDER — ALBUTEROL SULFATE 2.5 MG/3ML
2.5 SOLUTION RESPIRATORY (INHALATION) ONCE AS NEEDED
Status: DISCONTINUED | OUTPATIENT
Start: 2018-09-10 | End: 2018-09-10

## 2018-09-10 RX ORDER — SODIUM CHLORIDE, SODIUM LACTATE, POTASSIUM CHLORIDE, CALCIUM CHLORIDE 600; 310; 30; 20 MG/100ML; MG/100ML; MG/100ML; MG/100ML
9 INJECTION, SOLUTION INTRAVENOUS CONTINUOUS
Status: DISCONTINUED | OUTPATIENT
Start: 2018-09-10 | End: 2018-09-10

## 2018-09-10 RX ORDER — MEPERIDINE HYDROCHLORIDE 25 MG/ML
12.5 INJECTION INTRAMUSCULAR; INTRAVENOUS; SUBCUTANEOUS
Status: DISCONTINUED | OUTPATIENT
Start: 2018-09-10 | End: 2018-09-10

## 2018-09-10 RX ORDER — FENTANYL CITRATE 50 UG/ML
INJECTION, SOLUTION INTRAMUSCULAR; INTRAVENOUS AS NEEDED
Status: DISCONTINUED | OUTPATIENT
Start: 2018-09-10 | End: 2018-09-10 | Stop reason: SURG

## 2018-09-10 RX ORDER — ONDANSETRON 4 MG/1
4 TABLET, FILM COATED ORAL EVERY 6 HOURS PRN
Status: DISCONTINUED | OUTPATIENT
Start: 2018-09-10 | End: 2018-09-12 | Stop reason: HOSPADM

## 2018-09-10 RX ORDER — MERCAPTOPURINE 50 MG/1
50 TABLET ORAL DAILY
COMMUNITY
End: 2021-01-18

## 2018-09-10 RX ORDER — FLUMAZENIL 0.1 MG/ML
0.2 INJECTION INTRAVENOUS AS NEEDED
Status: DISCONTINUED | OUTPATIENT
Start: 2018-09-10 | End: 2018-09-10

## 2018-09-10 RX ORDER — CHOLESTYRAMINE LIGHT 4 G/5.7G
1 POWDER, FOR SUSPENSION ORAL DAILY
Status: DISCONTINUED | OUTPATIENT
Start: 2018-09-10 | End: 2018-09-12 | Stop reason: HOSPADM

## 2018-09-10 RX ORDER — LABETALOL HYDROCHLORIDE 5 MG/ML
5 INJECTION, SOLUTION INTRAVENOUS
Status: DISCONTINUED | OUTPATIENT
Start: 2018-09-10 | End: 2018-09-10

## 2018-09-10 RX ORDER — LIDOCAINE HYDROCHLORIDE 20 MG/ML
INJECTION, SOLUTION INFILTRATION; PERINEURAL AS NEEDED
Status: DISCONTINUED | OUTPATIENT
Start: 2018-09-10 | End: 2018-09-10 | Stop reason: SURG

## 2018-09-10 RX ORDER — DEXAMETHASONE SODIUM PHOSPHATE 10 MG/ML
INJECTION INTRAMUSCULAR; INTRAVENOUS AS NEEDED
Status: DISCONTINUED | OUTPATIENT
Start: 2018-09-10 | End: 2018-09-10 | Stop reason: SURG

## 2018-09-10 RX ORDER — PROMETHAZINE HYDROCHLORIDE 25 MG/ML
12.5 INJECTION, SOLUTION INTRAMUSCULAR; INTRAVENOUS ONCE AS NEEDED
Status: DISCONTINUED | OUTPATIENT
Start: 2018-09-10 | End: 2018-09-10

## 2018-09-10 RX ORDER — NALOXONE HCL 0.4 MG/ML
0.1 VIAL (ML) INJECTION
Status: DISCONTINUED | OUTPATIENT
Start: 2018-09-10 | End: 2018-09-12 | Stop reason: HOSPADM

## 2018-09-10 RX ORDER — BUPIVACAINE HYDROCHLORIDE AND EPINEPHRINE 5; 5 MG/ML; UG/ML
INJECTION, SOLUTION PERINEURAL AS NEEDED
Status: DISCONTINUED | OUTPATIENT
Start: 2018-09-10 | End: 2018-09-10 | Stop reason: HOSPADM

## 2018-09-10 RX ORDER — SERTRALINE HYDROCHLORIDE 100 MG/1
100 TABLET, FILM COATED ORAL DAILY
Status: DISCONTINUED | OUTPATIENT
Start: 2018-09-10 | End: 2018-09-12 | Stop reason: HOSPADM

## 2018-09-10 RX ORDER — OXYCODONE HYDROCHLORIDE AND ACETAMINOPHEN 5; 325 MG/1; MG/1
2 TABLET ORAL EVERY 4 HOURS PRN
Status: DISCONTINUED | OUTPATIENT
Start: 2018-09-10 | End: 2018-09-12 | Stop reason: HOSPADM

## 2018-09-10 RX ORDER — KETOROLAC TROMETHAMINE 30 MG/ML
INJECTION, SOLUTION INTRAMUSCULAR; INTRAVENOUS AS NEEDED
Status: DISCONTINUED | OUTPATIENT
Start: 2018-09-10 | End: 2018-09-10 | Stop reason: SURG

## 2018-09-10 RX ORDER — DIPHENHYDRAMINE HYDROCHLORIDE 50 MG/ML
12.5 INJECTION INTRAMUSCULAR; INTRAVENOUS
Status: DISCONTINUED | OUTPATIENT
Start: 2018-09-10 | End: 2018-09-10

## 2018-09-10 RX ORDER — ONDANSETRON 2 MG/ML
4 INJECTION INTRAMUSCULAR; INTRAVENOUS ONCE AS NEEDED
Status: DISCONTINUED | OUTPATIENT
Start: 2018-09-10 | End: 2018-09-10

## 2018-09-10 RX ORDER — CEFAZOLIN SODIUM 2 G/100ML
2 INJECTION, SOLUTION INTRAVENOUS ONCE
Status: COMPLETED | OUTPATIENT
Start: 2018-09-10 | End: 2018-09-10

## 2018-09-10 RX ORDER — PROMETHAZINE HYDROCHLORIDE 25 MG/ML
5 INJECTION, SOLUTION INTRAMUSCULAR; INTRAVENOUS
Status: DISCONTINUED | OUTPATIENT
Start: 2018-09-10 | End: 2018-09-10

## 2018-09-10 RX ORDER — PROMETHAZINE HYDROCHLORIDE 25 MG/ML
INJECTION, SOLUTION INTRAMUSCULAR; INTRAVENOUS AS NEEDED
Status: DISCONTINUED | OUTPATIENT
Start: 2018-09-10 | End: 2018-09-10 | Stop reason: SURG

## 2018-09-10 RX ORDER — FENTANYL CITRATE 50 UG/ML
50 INJECTION, SOLUTION INTRAMUSCULAR; INTRAVENOUS
Status: DISCONTINUED | OUTPATIENT
Start: 2018-09-10 | End: 2018-09-10 | Stop reason: HOSPADM

## 2018-09-10 RX ORDER — PROPOFOL 10 MG/ML
VIAL (ML) INTRAVENOUS AS NEEDED
Status: DISCONTINUED | OUTPATIENT
Start: 2018-09-10 | End: 2018-09-10 | Stop reason: SURG

## 2018-09-10 RX ORDER — MIDAZOLAM HYDROCHLORIDE 1 MG/ML
2 INJECTION INTRAMUSCULAR; INTRAVENOUS
Status: DISCONTINUED | OUTPATIENT
Start: 2018-09-10 | End: 2018-09-10 | Stop reason: HOSPADM

## 2018-09-10 RX ORDER — FAMOTIDINE 10 MG/ML
20 INJECTION, SOLUTION INTRAVENOUS ONCE
Status: COMPLETED | OUTPATIENT
Start: 2018-09-10 | End: 2018-09-10

## 2018-09-10 RX ORDER — GLYCOPYRROLATE 0.2 MG/ML
INJECTION INTRAMUSCULAR; INTRAVENOUS AS NEEDED
Status: DISCONTINUED | OUTPATIENT
Start: 2018-09-10 | End: 2018-09-10 | Stop reason: SURG

## 2018-09-10 RX ORDER — ONDANSETRON 2 MG/ML
4 INJECTION INTRAMUSCULAR; INTRAVENOUS EVERY 6 HOURS PRN
Status: DISCONTINUED | OUTPATIENT
Start: 2018-09-10 | End: 2018-09-12 | Stop reason: HOSPADM

## 2018-09-10 RX ORDER — OXYCODONE AND ACETAMINOPHEN 7.5; 325 MG/1; MG/1
1 TABLET ORAL ONCE AS NEEDED
Status: DISCONTINUED | OUTPATIENT
Start: 2018-09-10 | End: 2018-09-10

## 2018-09-10 RX ORDER — HYDROCODONE BITARTRATE AND ACETAMINOPHEN 7.5; 325 MG/1; MG/1
1 TABLET ORAL ONCE AS NEEDED
Status: DISCONTINUED | OUTPATIENT
Start: 2018-09-10 | End: 2018-09-10

## 2018-09-10 RX ADMIN — ROCURONIUM BROMIDE 5 MG: 10 INJECTION INTRAVENOUS at 13:35

## 2018-09-10 RX ADMIN — HYDROMORPHONE HYDROCHLORIDE 0.5 MG: 1 INJECTION, SOLUTION INTRAMUSCULAR; INTRAVENOUS; SUBCUTANEOUS at 17:02

## 2018-09-10 RX ADMIN — FENTANYL CITRATE 50 MCG: 50 INJECTION, SOLUTION INTRAMUSCULAR; INTRAVENOUS at 14:10

## 2018-09-10 RX ADMIN — MEPERIDINE HYDROCHLORIDE 12.5 MG: 25 INJECTION INTRAMUSCULAR; INTRAVENOUS; SUBCUTANEOUS at 16:34

## 2018-09-10 RX ADMIN — FENTANYL CITRATE 50 MCG: 50 INJECTION, SOLUTION INTRAMUSCULAR; INTRAVENOUS at 16:41

## 2018-09-10 RX ADMIN — MIDAZOLAM HYDROCHLORIDE 2 MG: 2 INJECTION, SOLUTION INTRAMUSCULAR; INTRAVENOUS at 12:45

## 2018-09-10 RX ADMIN — PROPOFOL 150 MG: 10 INJECTION, EMULSION INTRAVENOUS at 13:35

## 2018-09-10 RX ADMIN — ROCURONIUM BROMIDE 20 MG: 10 INJECTION INTRAVENOUS at 14:05

## 2018-09-10 RX ADMIN — OXYCODONE HYDROCHLORIDE AND ACETAMINOPHEN 2 TABLET: 5; 325 TABLET ORAL at 20:53

## 2018-09-10 RX ADMIN — DEXAMETHASONE SODIUM PHOSPHATE 10 MG: 10 INJECTION INTRAMUSCULAR; INTRAVENOUS at 13:42

## 2018-09-10 RX ADMIN — ONDANSETRON 4 MG: 2 INJECTION INTRAMUSCULAR; INTRAVENOUS at 15:45

## 2018-09-10 RX ADMIN — LIDOCAINE HYDROCHLORIDE 60 MG: 20 INJECTION, SOLUTION INFILTRATION; PERINEURAL at 13:29

## 2018-09-10 RX ADMIN — SUCCINYLCHOLINE CHLORIDE 100 MG: 20 INJECTION, SOLUTION INTRAMUSCULAR; INTRAVENOUS; PARENTERAL at 13:35

## 2018-09-10 RX ADMIN — LIDOCAINE HYDROCHLORIDE 1 EACH: 40 SOLUTION TOPICAL at 13:37

## 2018-09-10 RX ADMIN — SCOPALAMINE 1 PATCH: 1 PATCH, EXTENDED RELEASE TRANSDERMAL at 12:45

## 2018-09-10 RX ADMIN — HYDROMORPHONE HYDROCHLORIDE 0.5 MG: 1 INJECTION, SOLUTION INTRAMUSCULAR; INTRAVENOUS; SUBCUTANEOUS at 16:41

## 2018-09-10 RX ADMIN — CEFAZOLIN SODIUM 2 G: 2 INJECTION, SOLUTION INTRAVENOUS at 13:29

## 2018-09-10 RX ADMIN — PROMETHAZINE HYDROCHLORIDE 5 MG: 25 INJECTION INTRAMUSCULAR; INTRAVENOUS at 13:41

## 2018-09-10 RX ADMIN — PROMETHAZINE HYDROCHLORIDE 5 MG: 25 INJECTION INTRAMUSCULAR; INTRAVENOUS at 15:42

## 2018-09-10 RX ADMIN — DIPHENHYDRAMINE HYDROCHLORIDE 50 MG: 25 CAPSULE ORAL at 21:30

## 2018-09-10 RX ADMIN — FENTANYL CITRATE 50 MCG: 50 INJECTION, SOLUTION INTRAMUSCULAR; INTRAVENOUS at 14:15

## 2018-09-10 RX ADMIN — SODIUM CHLORIDE, POTASSIUM CHLORIDE, SODIUM LACTATE AND CALCIUM CHLORIDE 9 ML/HR: 600; 310; 30; 20 INJECTION, SOLUTION INTRAVENOUS at 12:45

## 2018-09-10 RX ADMIN — ROCURONIUM BROMIDE 10 MG: 10 INJECTION INTRAVENOUS at 14:51

## 2018-09-10 RX ADMIN — SODIUM CHLORIDE, POTASSIUM CHLORIDE, SODIUM LACTATE AND CALCIUM CHLORIDE: 600; 310; 30; 20 INJECTION, SOLUTION INTRAVENOUS at 14:18

## 2018-09-10 RX ADMIN — SUGAMMADEX 200 MG: 100 INJECTION, SOLUTION INTRAVENOUS at 15:56

## 2018-09-10 RX ADMIN — GLYCOPYRROLATE 0.2 MG: 0.2 INJECTION INTRAMUSCULAR; INTRAVENOUS at 13:42

## 2018-09-10 RX ADMIN — FAMOTIDINE 20 MG: 10 INJECTION, SOLUTION INTRAVENOUS at 12:45

## 2018-09-10 RX ADMIN — FENTANYL CITRATE 100 MCG: 50 INJECTION, SOLUTION INTRAMUSCULAR; INTRAVENOUS at 13:29

## 2018-09-10 RX ADMIN — KETOROLAC TROMETHAMINE 30 MG: 30 INJECTION, SOLUTION INTRAMUSCULAR; INTRAVENOUS at 15:47

## 2018-09-10 RX ADMIN — FENTANYL CITRATE 50 MCG: 50 INJECTION, SOLUTION INTRAMUSCULAR; INTRAVENOUS at 17:02

## 2018-09-10 RX ADMIN — FENTANYL CITRATE 50 MCG: 50 INJECTION, SOLUTION INTRAMUSCULAR; INTRAVENOUS at 14:00

## 2018-09-10 RX ADMIN — HYDROMORPHONE HYDROCHLORIDE 1 MG: 1 INJECTION, SOLUTION INTRAMUSCULAR; INTRAVENOUS; SUBCUTANEOUS at 18:53

## 2018-09-10 RX ADMIN — ROCURONIUM BROMIDE 20 MG: 10 INJECTION INTRAVENOUS at 14:44

## 2018-09-10 NOTE — OP NOTE
Surgeon: Shawn Villasenor Jr., M.D.    Assistant: Shawn Villasenor M.D.    Pre-Operative Diagnosis:     Ventral hernia without obstruction or gangrene [K43.9]    Post-Operative Diagnosis:    Post-Op Diagnosis Codes:     * Ventral hernia without obstruction or gangrene [K43.9]    Procedure Performed:     Ventral hernia repair with component separation and mesh    Indications:     The patient is a pleasant 28-year-old female who has had several abdominal operations related to complications from Crohn's disease.  She has developed a ventral hernia in the midline near the umbilicus as well as a previous ileostomy site in the right lower quadrant.  Both of these areas are becoming increasingly symptomatic.  She presents for ventral hernia repair.  The patient understands the indications, alternatives, risks, and benefits of the procedure and wishes to proceed.    Procedure:     The patient was identified and taken to the operating room where she was placed in the supine position on the operating table.  She underwent general endotracheal anesthesia and was appropriately monitored throughout the case by the anesthesia personnel.  Her abdomen was prepped and draped in the standard surgical fashion.  Previous midline incision was opened with a scalpel carried through the skin into the subcutaneous tissue.  Subcutaneous tissues divided using Bovie electrocautery down to level of a ventral hernia at the umbilicus.  The ventral hernia was freed from surrounding subcutaneous attachments and mobilized all the way to the fascial edge.  In the process the umbilicus was removed from the ventral hernia and elevated with the rest of the abdominal wall.  The hernia sac was then incised at the fascial level and completely excised.  The defect was about 8 cm in diameter and could not be brought together without tension.  Therefore the fascia was mobilized by removing the subcutaneous tissue off the fascia using Bovie electrocautery to  "position just lateral to the rectus abdominous muscle.  The fascia of the external oblique was incised in a vertical fashion thereby  the component and allowing mobilization of the fascia to the midline.  With the mobilization of the abdominal wall the right lower quadrant hernia was unroofed and this was freed from surrounding subcutaneous attachments and completely reduced.  The fascia was then reapproximated using 0 PDS sutures in an interrupted fashion.  A piece of Bard mesh was then soaked in antibiotics and placed over the closed fascia using the 6\" x 6\" mesh.  It was first used to completely cover the right lower quadrant defect by tacking along the fascial edges of the hernia using 0 Vicryl suture.  It was then tacked into position to securely cover the entire hernia by tacking it at the superior margin and both lateral margins at the lateral edge of the external oblique that had been incised.  This tacking was performed with 0 Vicryl sutures.  Following this the entire edge of the mesh was sutured into place using 2-0 Vicryl suture in a running fashion.  The area was then infiltrated with 0.5% Marcaine with epinephrine followed by Exparel.  A 19 round Gerard drain was placed in the wound and brought out through a right lower quadrant stab wound.  It was sutured at the skin level with 3-0 nylon suture.  The umbilicus was then tacked down using a 3-0 Vicryl suture.  The subcutaneous tissue was reapproximated using 3-0 Vicryl suture.  The skin was closed with skin staples followed by sterile dressing.  The sponge, needle, and ensuring counts were correct at the end the case.  The patient tolerated procedure well and was transferred to the recovery room in stable condition.    Estimated Blood Loss:      minimal    Specimens:     None    Shawn Villasenor Jr., M.D.    "

## 2018-09-10 NOTE — ANESTHESIA POSTPROCEDURE EVALUATION
"Patient: Doreen Stephenson    Procedure Summary     Date:  09/10/18 Room / Location:  [facility] OR 03 / [facility] MAIN OR    Anesthesia Start:  1328 Anesthesia Stop:  1618    Procedure:  VENTRAL/INCISIONAL HERNIA REPAIR WITH MESH AND COMPONENT SEPARATION (N/A Abdomen) Diagnosis:       Ventral hernia without obstruction or gangrene      (Ventral hernia without obstruction or gangrene [K43.9])    Surgeon:  Shawn Villasenor Jr., MD Provider:  Yevgeniy Ordonez MD    Anesthesia Type:  general ASA Status:  2          Anesthesia Type: general  Last vitals  BP   122/73 (09/10/18 1847)   Temp   36.8 °C (98.2 °F) (09/10/18 1759)   Pulse   91 (09/10/18 1847)   Resp   16 (09/10/18 1847)     SpO2   97 % (09/10/18 1847)     Post Anesthesia Care and Evaluation    Patient location during evaluation: bedside  Patient participation: complete - patient participated  Level of consciousness: awake  Pain management: adequate  Airway patency: patent  Anesthetic complications: No anesthetic complications    Cardiovascular status: acceptable  Respiratory status: acceptable  Hydration status: acceptable    Comments: /73 (BP Location: Left arm, Patient Position: Lying)   Pulse 91   Temp 36.8 °C (98.2 °F) (Oral)   Resp 16   Ht 174 cm (68.5\")   Wt 94.3 kg (208 lb)   LMP 08/31/2018   SpO2 97%   BMI 31.17 kg/m²         "

## 2018-09-10 NOTE — H&P
Subjective      Doreen Stephenson is a 28 y.o. female who presents for a ventral hernia.     History of Present Illness      The patient has a history of Crohn's disease and underwent an ileocolic resection with primary anastomosis.  She had an anastomotic leak that required a reoperation with resection of the anastomosis and ileostomy.  She then had an ileostomy takedown.  She is currently on medical management of Crohn's disease with adequate control of her symptoms.  She has developed pain and a bulge just to the right of the umbilicus in the midline incision and in the lateral margin of the previous ileostomy site incision.  These areas are frequently symptomatic.  She has never had incarceration.  Her bowel function has remained at her baseline.     Review of Systems   Constitutional: Negative for activity change, appetite change, fatigue and fever.   HENT: Negative for trouble swallowing and voice change.    Respiratory: Negative for chest tightness and shortness of breath.    Cardiovascular: Negative for chest pain and palpitations.   Gastrointestinal: Positive for abdominal pain. Negative for blood in stool, constipation, diarrhea, nausea and vomiting.   Endocrine: Negative for cold intolerance and heat intolerance.   Genitourinary: Negative for dysuria and flank pain.   Neurological: Negative for dizziness and light-headedness.   Hematological: Negative for adenopathy. Does not bruise/bleed easily.   Psychiatric/Behavioral: Negative for agitation and confusion.      Medical History        Past Medical History:   Diagnosis Date   • Anemia     • Anxiety     • Crohn's disease (CMS/HCC)     • Depression     • Hyperlipidemia           Surgical History   Past Surgical History:   Procedure Laterality Date   • COLON RESECTION SMALL BOWEL N/A 08/31/2017     Dr. Rockwell   • COLON RESECTION WITH COLOSTOMY N/A 09/04/2017     Due to anastomosis infection-Dr. Rockwell   • COLONOSCOPY N/A 2016     Dr. Bowser   •  ILEOSTOMY CLOSURE N/A 12/12/2017     Dr. Rockwell         History reviewed. No pertinent family history.  Social History   Social History            Social History   • Marital status: Single       Spouse name: N/A   • Number of children: N/A   • Years of education: N/A          Occupational History   • Not on file.           Social History Main Topics   • Smoking status: Never Smoker   • Smokeless tobacco: Never Used   • Alcohol use No   • Drug use: Unknown   • Sexual activity: Defer           Other Topics Concern   • Not on file          Social History Narrative   • No narrative on file               Objective      Physical Exam   Constitutional: She is oriented to person, place, and time. She appears well-developed and well-nourished.  Non-toxic appearance.   HENT:   Head: Normocephalic and atraumatic.   Eyes: EOM are normal. No scleral icterus.   Neck: Normal range of motion. Neck supple.   Pulmonary/Chest: Effort normal. No respiratory distress.   Abdominal: Soft. Normal appearance. There is no tenderness. A hernia is present. Hernia confirmed positive in the ventral area (Moderately sized area of abdominal wall laxity or ventral hernia in the midline along the right side of the umbilicus and a small reducible ventral hernia at the previous ileostomy site).   Neurological: She is alert and oriented to person, place, and time.   Skin: Skin is warm and dry.   Psychiatric: She has a normal mood and affect. Her behavior is normal. Judgment and thought content normal.            Assessment/Plan            The encounter diagnosis was Ventral hernia without obstruction or gangrene.     The patient has 2 symptomatic ventral hernias.  She has been scheduled for a ventral hernia repair.  She understands that the ventral hernia in the midline will likely require component separation and mesh.  The patient understands the indications, alternatives, risks, and benefits of the procedure and wishes to proceed.

## 2018-09-10 NOTE — ANESTHESIA PROCEDURE NOTES
Airway  Urgency: elective    Airway not difficult    General Information and Staff    Patient location during procedure: OR  Anesthesiologist: FRANCISCA OLIVO  CRNA: KIET BAUMANN    Indications and Patient Condition  Indications for airway management: airway protection    Preoxygenated: yes  MILS maintained throughout  Mask difficulty assessment: 1 - vent by mask    Final Airway Details  Final airway type: endotracheal airway      Successful airway: ETT  Cuffed: yes   Successful intubation technique: direct laryngoscopy  Facilitating devices/methods: intubating stylet  Endotracheal tube insertion site: oral  Blade: Roberto  Blade size: 3  ETT size: 7.0 mm  Cormack-Lehane Classification: grade I - full view of glottis  Placement verified by: chest auscultation and capnometry   Cuff volume (mL): 5  Measured from: lips  ETT to lips (cm): 21  Number of attempts at approach: 1    Additional Comments  Atraumatic ET Tube placement.  Teeth as pre-op. BLEBS.  -ABD sounds.  +ET CO2.  Secured to face

## 2018-09-11 LAB
ANION GAP SERPL CALCULATED.3IONS-SCNC: 11.5 MMOL/L
BASOPHILS # BLD AUTO: 0.01 10*3/MM3 (ref 0–0.2)
BASOPHILS NFR BLD AUTO: 0.1 % (ref 0–1.5)
BUN BLD-MCNC: 8 MG/DL (ref 6–20)
BUN/CREAT SERPL: 11.9 (ref 7–25)
CALCIUM SPEC-SCNC: 8.4 MG/DL (ref 8.6–10.5)
CHLORIDE SERPL-SCNC: 103 MMOL/L (ref 98–107)
CO2 SERPL-SCNC: 24.5 MMOL/L (ref 22–29)
CREAT BLD-MCNC: 0.67 MG/DL (ref 0.57–1)
DEPRECATED RDW RBC AUTO: 47.7 FL (ref 37–54)
EOSINOPHIL # BLD AUTO: 0 10*3/MM3 (ref 0–0.7)
EOSINOPHIL NFR BLD AUTO: 0 % (ref 0.3–6.2)
ERYTHROCYTE [DISTWIDTH] IN BLOOD BY AUTOMATED COUNT: 16.7 % (ref 11.7–13)
GFR SERPL CREATININE-BSD FRML MDRD: 104 ML/MIN/1.73
GLUCOSE BLD-MCNC: 125 MG/DL (ref 65–99)
HCT VFR BLD AUTO: 33 % (ref 35.6–45.5)
HGB BLD-MCNC: 9.8 G/DL (ref 11.9–15.5)
IMM GRANULOCYTES # BLD: 0.03 10*3/MM3 (ref 0–0.03)
IMM GRANULOCYTES NFR BLD: 0.2 % (ref 0–0.5)
LYMPHOCYTES # BLD AUTO: 0.52 10*3/MM3 (ref 0.9–4.8)
LYMPHOCYTES NFR BLD AUTO: 3.7 % (ref 19.6–45.3)
MCH RBC QN AUTO: 23.6 PG (ref 26.9–32)
MCHC RBC AUTO-ENTMCNC: 29.7 G/DL (ref 32.4–36.3)
MCV RBC AUTO: 79.3 FL (ref 80.5–98.2)
MONOCYTES # BLD AUTO: 1.15 10*3/MM3 (ref 0.2–1.2)
MONOCYTES NFR BLD AUTO: 8.2 % (ref 5–12)
NEUTROPHILS # BLD AUTO: 12.25 10*3/MM3 (ref 1.9–8.1)
NEUTROPHILS NFR BLD AUTO: 87.8 % (ref 42.7–76)
PLATELET # BLD AUTO: 288 10*3/MM3 (ref 140–500)
PMV BLD AUTO: 10.1 FL (ref 6–12)
POTASSIUM BLD-SCNC: 4.3 MMOL/L (ref 3.5–5.2)
RBC # BLD AUTO: 4.16 10*6/MM3 (ref 3.9–5.2)
SODIUM BLD-SCNC: 139 MMOL/L (ref 136–145)
WBC NRBC COR # BLD: 13.96 10*3/MM3 (ref 4.5–10.7)

## 2018-09-11 PROCEDURE — 85025 COMPLETE CBC W/AUTO DIFF WBC: CPT | Performed by: SURGERY

## 2018-09-11 PROCEDURE — 80048 BASIC METABOLIC PNL TOTAL CA: CPT | Performed by: SURGERY

## 2018-09-11 PROCEDURE — 25010000002 ENOXAPARIN PER 10 MG: Performed by: SURGERY

## 2018-09-11 PROCEDURE — 25010000002 HYDROMORPHONE PER 4 MG: Performed by: SURGERY

## 2018-09-11 PROCEDURE — G0378 HOSPITAL OBSERVATION PER HR: HCPCS

## 2018-09-11 PROCEDURE — 99024 POSTOP FOLLOW-UP VISIT: CPT | Performed by: SURGERY

## 2018-09-11 RX ORDER — SIMETHICONE 80 MG
80 TABLET,CHEWABLE ORAL 4 TIMES DAILY PRN
Status: DISCONTINUED | OUTPATIENT
Start: 2018-09-11 | End: 2018-09-12 | Stop reason: HOSPADM

## 2018-09-11 RX ADMIN — OXYCODONE HYDROCHLORIDE AND ACETAMINOPHEN 2 TABLET: 5; 325 TABLET ORAL at 06:17

## 2018-09-11 RX ADMIN — HYDROMORPHONE HYDROCHLORIDE 1 MG: 1 INJECTION, SOLUTION INTRAMUSCULAR; INTRAVENOUS; SUBCUTANEOUS at 09:35

## 2018-09-11 RX ADMIN — ENOXAPARIN SODIUM 40 MG: 40 INJECTION SUBCUTANEOUS at 09:14

## 2018-09-11 RX ADMIN — OXYCODONE HYDROCHLORIDE AND ACETAMINOPHEN 2 TABLET: 5; 325 TABLET ORAL at 18:56

## 2018-09-11 RX ADMIN — SODIUM CHLORIDE, POTASSIUM CHLORIDE, SODIUM LACTATE AND CALCIUM CHLORIDE 75 ML/HR: 600; 310; 30; 20 INJECTION, SOLUTION INTRAVENOUS at 02:12

## 2018-09-11 RX ADMIN — HYDROMORPHONE HYDROCHLORIDE 1 MG: 1 INJECTION, SOLUTION INTRAMUSCULAR; INTRAVENOUS; SUBCUTANEOUS at 01:22

## 2018-09-11 RX ADMIN — OXYCODONE HYDROCHLORIDE AND ACETAMINOPHEN 2 TABLET: 5; 325 TABLET ORAL at 10:53

## 2018-09-11 RX ADMIN — OXYCODONE HYDROCHLORIDE AND ACETAMINOPHEN 2 TABLET: 5; 325 TABLET ORAL at 22:48

## 2018-09-11 RX ADMIN — SIMETHICONE CHEW TAB 80 MG 80 MG: 80 TABLET ORAL at 16:38

## 2018-09-11 RX ADMIN — OXYCODONE HYDROCHLORIDE AND ACETAMINOPHEN 2 TABLET: 5; 325 TABLET ORAL at 14:49

## 2018-09-11 NOTE — PROGRESS NOTES
Chief Complaint:    S/P Ventral hernia repair with component separation and mesh, POD 1    Subjective:    The patient is feeling well with expected postop abdominal pain.  She has adequate pain control with medications.  She is tolerating a diet with no nausea or vomiting.  She has not passed flatus or had a bowel movement.    Objective:    Temp:  [97 °F (36.1 °C)-98.7 °F (37.1 °C)] 98.3 °F (36.8 °C)  Heart Rate:  [56-96] 56  Resp:  [14-16] 16  BP: ()/() 94/62    Physical Exam   Constitutional: She is cooperative.  Non-toxic appearance. She does not have a sickly appearance.   Pulmonary/Chest: Effort normal. No respiratory distress.   Abdominal: Soft. There is generalized tenderness (Appropriate postop tenderness).   SEBLE drain: Serosanguineous output.   Neurological: She is alert.       Results:    White blood cell count is 13.96, expected after surgery.  H/H is 9.8/33.0.    Impression/Plan:    The patient is POD 1 from a ventral hernia repair with component separation and mesh.  She was encouraged to increase her activity.  An abdominal binder will be placed for her comfort.  I anticipate discharge to home in the next day or 2.    Shawn Villasenor Jr., M.D.

## 2018-09-11 NOTE — PLAN OF CARE
Problem: Patient Care Overview  Goal: Plan of Care Review  Outcome: Ongoing (interventions implemented as appropriate)   09/11/18 2590   Coping/Psychosocial   Plan of Care Reviewed With patient;family   Plan of Care Review   Progress improving   OTHER   Outcome Summary B/P running low other VSS. C/O abd pain with pain medication given po and IV. Mediline dressing cd&i with small amount of dried drainage. Jpx1 with good output. Voiding freely. IVF's infusing. Amb in halls. Resting well.      Goal: Individualization and Mutuality  Outcome: Ongoing (interventions implemented as appropriate)    Goal: Discharge Needs Assessment  Outcome: Ongoing (interventions implemented as appropriate)    Goal: Interprofessional Rounds/Family Conf  Outcome: Ongoing (interventions implemented as appropriate)      Problem: Surgery Nonspecified (Adult)  Goal: Signs and Symptoms of Listed Potential Problems Will be Absent, Minimized or Managed (Surgery Nonspecified)  Outcome: Ongoing (interventions implemented as appropriate)    Goal: Anesthesia/Sedation Recovery  Outcome: Ongoing (interventions implemented as appropriate)

## 2018-09-11 NOTE — PLAN OF CARE
Problem: Patient Care Overview  Goal: Plan of Care Review  Outcome: Ongoing (interventions implemented as appropriate)   09/11/18 0242   Coping/Psychosocial   Plan of Care Reviewed With patient   Plan of Care Review   Progress improving   OTHER   Outcome Summary VSS. C/o incisional abd pain throughout the night, medicated with IV and PO pain medication with relief. Midline incision CDI with some shadowing drainage. SEBLE x1 draining well, FC to BSD to be dc in AM. IVF cont. Currently resting well.      Goal: Individualization and Mutuality  Outcome: Ongoing (interventions implemented as appropriate)    Goal: Discharge Needs Assessment  Outcome: Ongoing (interventions implemented as appropriate)    Goal: Interprofessional Rounds/Family Conf  Outcome: Ongoing (interventions implemented as appropriate)      Problem: Surgery Nonspecified (Adult)  Goal: Signs and Symptoms of Listed Potential Problems Will be Absent, Minimized or Managed (Surgery Nonspecified)  Outcome: Ongoing (interventions implemented as appropriate)    Goal: Anesthesia/Sedation Recovery  Outcome: Ongoing (interventions implemented as appropriate)

## 2018-09-12 VITALS
HEIGHT: 69 IN | DIASTOLIC BLOOD PRESSURE: 77 MMHG | HEART RATE: 72 BPM | RESPIRATION RATE: 16 BRPM | TEMPERATURE: 98.4 F | SYSTOLIC BLOOD PRESSURE: 117 MMHG | BODY MASS INDEX: 30.81 KG/M2 | OXYGEN SATURATION: 96 % | WEIGHT: 208 LBS

## 2018-09-12 LAB
ANION GAP SERPL CALCULATED.3IONS-SCNC: 10.9 MMOL/L
BUN BLD-MCNC: 9 MG/DL (ref 6–20)
BUN/CREAT SERPL: 12 (ref 7–25)
CALCIUM SPEC-SCNC: 8.1 MG/DL (ref 8.6–10.5)
CHLORIDE SERPL-SCNC: 104 MMOL/L (ref 98–107)
CO2 SERPL-SCNC: 26.1 MMOL/L (ref 22–29)
CREAT BLD-MCNC: 0.75 MG/DL (ref 0.57–1)
DEPRECATED RDW RBC AUTO: 50.2 FL (ref 37–54)
ERYTHROCYTE [DISTWIDTH] IN BLOOD BY AUTOMATED COUNT: 17.2 % (ref 11.7–13)
GFR SERPL CREATININE-BSD FRML MDRD: 91 ML/MIN/1.73
GLUCOSE BLD-MCNC: 81 MG/DL (ref 65–99)
HCT VFR BLD AUTO: 32.1 % (ref 35.6–45.5)
HGB BLD-MCNC: 9.4 G/DL (ref 11.9–15.5)
MCH RBC QN AUTO: 23.5 PG (ref 26.9–32)
MCHC RBC AUTO-ENTMCNC: 29.3 G/DL (ref 32.4–36.3)
MCV RBC AUTO: 80.3 FL (ref 80.5–98.2)
PLATELET # BLD AUTO: 267 10*3/MM3 (ref 140–500)
PMV BLD AUTO: 10.1 FL (ref 6–12)
POTASSIUM BLD-SCNC: 4.3 MMOL/L (ref 3.5–5.2)
RBC # BLD AUTO: 4 10*6/MM3 (ref 3.9–5.2)
SODIUM BLD-SCNC: 141 MMOL/L (ref 136–145)
WBC NRBC COR # BLD: 8.12 10*3/MM3 (ref 4.5–10.7)

## 2018-09-12 PROCEDURE — 80048 BASIC METABOLIC PNL TOTAL CA: CPT | Performed by: SURGERY

## 2018-09-12 PROCEDURE — G0378 HOSPITAL OBSERVATION PER HR: HCPCS

## 2018-09-12 PROCEDURE — 85027 COMPLETE CBC AUTOMATED: CPT | Performed by: SURGERY

## 2018-09-12 PROCEDURE — 25010000002 ENOXAPARIN PER 10 MG: Performed by: SURGERY

## 2018-09-12 RX ORDER — OXYCODONE HYDROCHLORIDE AND ACETAMINOPHEN 5; 325 MG/1; MG/1
TABLET ORAL
Qty: 40 TABLET | Refills: 0 | Status: SHIPPED | OUTPATIENT
Start: 2018-09-12 | End: 2018-11-06

## 2018-09-12 RX ORDER — ONDANSETRON 4 MG/1
4 TABLET, ORALLY DISINTEGRATING ORAL EVERY 6 HOURS PRN
Qty: 20 TABLET | Refills: 0 | Status: SHIPPED | OUTPATIENT
Start: 2018-09-12 | End: 2018-09-18

## 2018-09-12 RX ADMIN — ONDANSETRON 4 MG: 4 TABLET, FILM COATED ORAL at 08:25

## 2018-09-12 RX ADMIN — ENOXAPARIN SODIUM 40 MG: 40 INJECTION SUBCUTANEOUS at 08:25

## 2018-09-12 RX ADMIN — OXYCODONE HYDROCHLORIDE AND ACETAMINOPHEN 2 TABLET: 5; 325 TABLET ORAL at 08:25

## 2018-09-12 RX ADMIN — OXYCODONE HYDROCHLORIDE AND ACETAMINOPHEN 2 TABLET: 5; 325 TABLET ORAL at 04:18

## 2018-09-12 RX ADMIN — OXYCODONE HYDROCHLORIDE AND ACETAMINOPHEN 2 TABLET: 5; 325 TABLET ORAL at 12:14

## 2018-09-12 NOTE — PROGRESS NOTES
Case Management Discharge Note         Other:  (private vehicle)    Final Discharge Disposition Code: 01 - home or self-care

## 2018-09-12 NOTE — DISCHARGE SUMMARY
Discharge Summary    Date of admission: 9/10/2018    Date of discharge: 9/12/2018    Final diagnosis:    1.  Ventral hernia  2.  Crohn's disease    Procedures performed during admission:    1.  Ventral hernia repair with component separation and mesh on 9/10/2018    Reason for admission:    The patient is a pleasant 28-year-old female who is had previous surgeries for, Crohn's disease and has developed a large ventral hernia.  She presents for ventral hernia repair.    Hospital course:    The patient was admitted on 9/10/2018 and underwent a ventral hernia repair with component separation and mesh.  Postoperative issues transferred to the floor where she did quite well.  She had a brief postoperative ileus that resolved quickly.  Her diet was advanced and she tolerated this well.  She was up and ambulating with only minimal difficulty.  She remained afebrile and hemodynamically stable.  Her abdomen remained soft and appropriately tender for the postoperative period.  She is discharged to home and will follow-up with me in 1 week for SEBLE drain removal.

## 2018-09-12 NOTE — PLAN OF CARE
Problem: Patient Care Overview  Goal: Plan of Care Review  Outcome: Ongoing (interventions implemented as appropriate)   09/12/18 0610   Coping/Psychosocial   Plan of Care Reviewed With patient   Plan of Care Review   Progress improving   OTHER   Outcome Summary vss, c/o pain medicated, voiding w/o difficulty, tolerating regualr diet, encourage to ambulate more in the hallway and to increase fluid intake and to use incentive spirometer.     Goal: Individualization and Mutuality  Outcome: Ongoing (interventions implemented as appropriate)    Goal: Discharge Needs Assessment  Outcome: Ongoing (interventions implemented as appropriate)    Goal: Interprofessional Rounds/Family Conf  Outcome: Ongoing (interventions implemented as appropriate)      Problem: Surgery Nonspecified (Adult)  Goal: Signs and Symptoms of Listed Potential Problems Will be Absent, Minimized or Managed (Surgery Nonspecified)  Outcome: Ongoing (interventions implemented as appropriate)    Goal: Anesthesia/Sedation Recovery  Outcome: Ongoing (interventions implemented as appropriate)

## 2018-09-18 ENCOUNTER — OFFICE VISIT (OUTPATIENT)
Dept: SURGERY | Facility: CLINIC | Age: 28
End: 2018-09-18

## 2018-09-18 VITALS — BODY MASS INDEX: 31.17 KG/M2 | OXYGEN SATURATION: 98 % | WEIGHT: 208 LBS | HEART RATE: 103 BPM

## 2018-09-18 DIAGNOSIS — Z48.89 POSTOPERATIVE VISIT: Primary | ICD-10-CM

## 2018-09-18 PROCEDURE — 99024 POSTOP FOLLOW-UP VISIT: CPT | Performed by: SURGERY

## 2018-09-18 NOTE — PROGRESS NOTES
Subjective   Doreen Stephenson is a 28 y.o. female who returns to the office after undergoing a ventral hernia repair with component separation and mesh on 9/10/2018.     History of Present Illness     The patient is recovering well with no significant postop symptoms.  She has expected postop abdominal pain that is slowly improving.  Her appetite is good and her bowel function is normal.  Her energy level is good.  The SEBLE drain output has been less than 30 cc a day for the past 4 or 5 days.      Review of Systems   Constitutional: Negative for activity change, appetite change, fatigue and fever.   Respiratory: Negative for chest tightness and shortness of breath.    Cardiovascular: Negative for chest pain and palpitations.   Gastrointestinal: Negative for abdominal pain, constipation, diarrhea and nausea.   Skin: Negative for rash and wound.   Psychiatric/Behavioral: Negative for agitation and confusion.       Objective   Physical Exam   Constitutional:  Non-toxic appearance. She does not appear ill. No distress.   Pulmonary/Chest: Effort normal. No respiratory distress.   Abdominal: Soft. Normal appearance. There is no tenderness.   Neurological: She is alert.   Skin:   Incision: intact with no evidence of infection.   Psychiatric: She has a normal mood and affect. Her behavior is normal.       Assessment/Plan   The encounter diagnosis was Postoperative visit.    The patient is recovering well from her ventral hernia repair with component separation and mesh.  She was advised to avoid heavy lifting for another 1 month.  She is scheduled to return to work on 10/9/2018.  She will follow-up on an as-needed basis.

## 2018-09-25 ENCOUNTER — OFFICE VISIT (OUTPATIENT)
Dept: SURGERY | Facility: CLINIC | Age: 28
End: 2018-09-25

## 2018-09-25 DIAGNOSIS — Z48.89 POSTOPERATIVE VISIT: Primary | ICD-10-CM

## 2018-09-25 PROCEDURE — 99024 POSTOP FOLLOW-UP VISIT: CPT | Performed by: SURGERY

## 2018-11-06 ENCOUNTER — OFFICE VISIT (OUTPATIENT)
Dept: SURGERY | Facility: CLINIC | Age: 28
End: 2018-11-06

## 2018-11-06 VITALS — HEIGHT: 69 IN | OXYGEN SATURATION: 99 % | HEART RATE: 76 BPM | WEIGHT: 218 LBS | BODY MASS INDEX: 32.29 KG/M2

## 2018-11-06 DIAGNOSIS — K43.9 VENTRAL HERNIA WITHOUT OBSTRUCTION OR GANGRENE: ICD-10-CM

## 2018-11-06 DIAGNOSIS — Z48.89 POSTOPERATIVE VISIT: Primary | ICD-10-CM

## 2018-11-06 PROCEDURE — 99024 POSTOP FOLLOW-UP VISIT: CPT | Performed by: SURGERY

## 2018-11-06 RX ORDER — CEFAZOLIN SODIUM 2 G/100ML
2 INJECTION, SOLUTION INTRAVENOUS ONCE
Status: CANCELLED | OUTPATIENT
Start: 2018-12-17 | End: 2018-11-06

## 2018-11-06 NOTE — PROGRESS NOTES
Subjective   Doreen Stephenson is a 28 y.o. female who returns to the office after undergoing a ventral hernia repair with component separation and mesh on 9/10/2018.     History of Present Illness     The patient is recovering well with no significant postop symptoms.  She is having minimal abdominal pain in the right lower quadrant at the previous ileostomy site.  She has also noticed a bulge in this area.  She has a good appetite and normal bowel function.  Her energy level is good.      Review of Systems   Constitutional: Negative for activity change, appetite change, fatigue and fever.   Respiratory: Negative for chest tightness and shortness of breath.    Cardiovascular: Negative for chest pain and palpitations.   Gastrointestinal: Positive for abdominal pain. Negative for constipation, diarrhea and nausea.   Skin: Negative for rash and wound.   Psychiatric/Behavioral: Negative for agitation and confusion.       Objective   Physical Exam   Constitutional:  Non-toxic appearance. She does not appear ill. No distress.   Pulmonary/Chest: Effort normal. No respiratory distress.   Abdominal: Soft. Normal appearance. There is no tenderness. A hernia is present. Hernia confirmed positive in the ventral area (There was a ventral hernia in the right lower quadrant at the previous ileostomy site).   Neurological: She is alert.   Skin:   Incision: intact with no evidence of infection.   Psychiatric: She has a normal mood and affect. Her behavior is normal.       Assessment/Plan   The encounter diagnosis was Postoperative visit.    The patient is recovering well from her ventral hernia repair with component separation and mesh regarding the midline defect.  She has developed a recurrent hernia at the previous ileostomy site.  She will require a ventral hernia repair for this recurrent hernia.  The patient understands the indications, alternatives, risks, and benefits of the procedure and wishes to proceed.

## 2018-12-17 ENCOUNTER — ANESTHESIA (OUTPATIENT)
Dept: PERIOP | Facility: HOSPITAL | Age: 28
End: 2018-12-17

## 2018-12-17 ENCOUNTER — HOSPITAL ENCOUNTER (OUTPATIENT)
Facility: HOSPITAL | Age: 28
Setting detail: HOSPITAL OUTPATIENT SURGERY
Discharge: HOME | End: 2018-12-17
Attending: SURGERY | Admitting: SURGERY

## 2018-12-17 ENCOUNTER — ANESTHESIA EVENT (OUTPATIENT)
Dept: PERIOP | Facility: HOSPITAL | Age: 28
End: 2018-12-17

## 2018-12-17 VITALS
DIASTOLIC BLOOD PRESSURE: 92 MMHG | HEART RATE: 75 BPM | WEIGHT: 221.5 LBS | RESPIRATION RATE: 16 BRPM | BODY MASS INDEX: 32.71 KG/M2 | OXYGEN SATURATION: 97 % | SYSTOLIC BLOOD PRESSURE: 130 MMHG | TEMPERATURE: 99.1 F

## 2018-12-17 DIAGNOSIS — K43.9 VENTRAL HERNIA WITHOUT OBSTRUCTION OR GANGRENE: ICD-10-CM

## 2018-12-17 LAB
ANION GAP SERPL CALCULATED.3IONS-SCNC: 10 MMOL/L
B-HCG UR QL: NEGATIVE
BASOPHILS # BLD AUTO: 0.03 10*3/MM3 (ref 0–0.2)
BASOPHILS NFR BLD AUTO: 0.5 % (ref 0–1.5)
BUN BLD-MCNC: 9 MG/DL (ref 6–20)
BUN/CREAT SERPL: 12.7 (ref 7–25)
CALCIUM SPEC-SCNC: 8.6 MG/DL (ref 8.6–10.5)
CHLORIDE SERPL-SCNC: 105 MMOL/L (ref 98–107)
CO2 SERPL-SCNC: 25 MMOL/L (ref 22–29)
CREAT BLD-MCNC: 0.71 MG/DL (ref 0.57–1)
DEPRECATED RDW RBC AUTO: 49.4 FL (ref 37–54)
EOSINOPHIL # BLD AUTO: 0.06 10*3/MM3 (ref 0–0.7)
EOSINOPHIL NFR BLD AUTO: 1 % (ref 0.3–6.2)
ERYTHROCYTE [DISTWIDTH] IN BLOOD BY AUTOMATED COUNT: 16.4 % (ref 11.7–13)
GFR SERPL CREATININE-BSD FRML MDRD: 97 ML/MIN/1.73
GLUCOSE BLD-MCNC: 83 MG/DL (ref 65–99)
HCT VFR BLD AUTO: 34.2 % (ref 35.6–45.5)
HGB BLD-MCNC: 10.9 G/DL (ref 11.9–15.5)
IMM GRANULOCYTES # BLD: 0.01 10*3/MM3 (ref 0–0.03)
IMM GRANULOCYTES NFR BLD: 0.2 % (ref 0–0.5)
INTERNAL NEGATIVE CONTROL: NEGATIVE
INTERNAL POSITIVE CONTROL: POSITIVE
LYMPHOCYTES # BLD AUTO: 0.96 10*3/MM3 (ref 0.9–4.8)
LYMPHOCYTES NFR BLD AUTO: 15.3 % (ref 19.6–45.3)
Lab: NORMAL
MCH RBC QN AUTO: 26 PG (ref 26.9–32)
MCHC RBC AUTO-ENTMCNC: 31.9 G/DL (ref 32.4–36.3)
MCV RBC AUTO: 81.6 FL (ref 80.5–98.2)
MONOCYTES # BLD AUTO: 0.45 10*3/MM3 (ref 0.2–1.2)
MONOCYTES NFR BLD AUTO: 7.2 % (ref 5–12)
NEUTROPHILS # BLD AUTO: 4.77 10*3/MM3 (ref 1.9–8.1)
NEUTROPHILS NFR BLD AUTO: 76 % (ref 42.7–76)
PLATELET # BLD AUTO: 258 10*3/MM3 (ref 140–500)
PMV BLD AUTO: 9.8 FL (ref 6–12)
POTASSIUM BLD-SCNC: 4.1 MMOL/L (ref 3.5–5.2)
RBC # BLD AUTO: 4.19 10*6/MM3 (ref 3.9–5.2)
SODIUM BLD-SCNC: 140 MMOL/L (ref 136–145)
WBC NRBC COR # BLD: 6.27 10*3/MM3 (ref 4.5–10.7)

## 2018-12-17 PROCEDURE — 25010000002 FENTANYL CITRATE (PF) 100 MCG/2ML SOLUTION: Performed by: NURSE ANESTHETIST, CERTIFIED REGISTERED

## 2018-12-17 PROCEDURE — 25010000002 KETOROLAC TROMETHAMINE PER 15 MG: Performed by: NURSE ANESTHETIST, CERTIFIED REGISTERED

## 2018-12-17 PROCEDURE — 25010000002 ONDANSETRON PER 1 MG: Performed by: NURSE ANESTHETIST, CERTIFIED REGISTERED

## 2018-12-17 PROCEDURE — 25010000002 PROPOFOL 10 MG/ML EMULSION: Performed by: NURSE ANESTHETIST, CERTIFIED REGISTERED

## 2018-12-17 PROCEDURE — 80048 BASIC METABOLIC PNL TOTAL CA: CPT | Performed by: SURGERY

## 2018-12-17 PROCEDURE — 25010000002 DEXAMETHASONE PER 1 MG: Performed by: NURSE ANESTHETIST, CERTIFIED REGISTERED

## 2018-12-17 PROCEDURE — S0260 H&P FOR SURGERY: HCPCS | Performed by: SURGERY

## 2018-12-17 PROCEDURE — 85025 COMPLETE CBC W/AUTO DIFF WBC: CPT | Performed by: SURGERY

## 2018-12-17 PROCEDURE — 81025 URINE PREGNANCY TEST: CPT | Performed by: ANESTHESIOLOGY

## 2018-12-17 PROCEDURE — 25010000002 MIDAZOLAM PER 1 MG: Performed by: ANESTHESIOLOGY

## 2018-12-17 PROCEDURE — 25010000003 CEFAZOLIN IN DEXTROSE 2-4 GM/100ML-% SOLUTION: Performed by: SURGERY

## 2018-12-17 PROCEDURE — C1781 MESH (IMPLANTABLE): HCPCS | Performed by: SURGERY

## 2018-12-17 PROCEDURE — 49565 PR REPAIR RECURR INCIS HERNIA,REDUC: CPT | Performed by: SURGERY

## 2018-12-17 PROCEDURE — 25010000002 HYDROMORPHONE PER 4 MG: Performed by: NURSE ANESTHETIST, CERTIFIED REGISTERED

## 2018-12-17 DEVICE — BARD SOFT MESH
Type: IMPLANTABLE DEVICE | Site: ABDOMEN | Status: FUNCTIONAL
Brand: BARD SOFT MESH

## 2018-12-17 RX ORDER — MIDAZOLAM HYDROCHLORIDE 1 MG/ML
1 INJECTION INTRAMUSCULAR; INTRAVENOUS
Status: DISCONTINUED | OUTPATIENT
Start: 2018-12-17 | End: 2018-12-17 | Stop reason: HOSPADM

## 2018-12-17 RX ORDER — HYDROMORPHONE HCL 110MG/55ML
PATIENT CONTROLLED ANALGESIA SYRINGE INTRAVENOUS AS NEEDED
Status: DISCONTINUED | OUTPATIENT
Start: 2018-12-17 | End: 2018-12-17 | Stop reason: SURG

## 2018-12-17 RX ORDER — HYDROMORPHONE HYDROCHLORIDE 1 MG/ML
0.5 INJECTION, SOLUTION INTRAMUSCULAR; INTRAVENOUS; SUBCUTANEOUS
Status: DISCONTINUED | OUTPATIENT
Start: 2018-12-17 | End: 2018-12-17 | Stop reason: HOSPADM

## 2018-12-17 RX ORDER — PROMETHAZINE HYDROCHLORIDE 25 MG/1
25 TABLET ORAL ONCE AS NEEDED
Status: DISCONTINUED | OUTPATIENT
Start: 2018-12-17 | End: 2018-12-17 | Stop reason: HOSPADM

## 2018-12-17 RX ORDER — DIPHENHYDRAMINE HYDROCHLORIDE 50 MG/ML
12.5 INJECTION INTRAMUSCULAR; INTRAVENOUS
Status: DISCONTINUED | OUTPATIENT
Start: 2018-12-17 | End: 2018-12-17 | Stop reason: HOSPADM

## 2018-12-17 RX ORDER — KETOROLAC TROMETHAMINE 30 MG/ML
INJECTION, SOLUTION INTRAMUSCULAR; INTRAVENOUS AS NEEDED
Status: DISCONTINUED | OUTPATIENT
Start: 2018-12-17 | End: 2018-12-17 | Stop reason: SURG

## 2018-12-17 RX ORDER — FLUMAZENIL 0.1 MG/ML
0.2 INJECTION INTRAVENOUS AS NEEDED
Status: DISCONTINUED | OUTPATIENT
Start: 2018-12-17 | End: 2018-12-17 | Stop reason: HOSPADM

## 2018-12-17 RX ORDER — SODIUM CHLORIDE, SODIUM LACTATE, POTASSIUM CHLORIDE, CALCIUM CHLORIDE 600; 310; 30; 20 MG/100ML; MG/100ML; MG/100ML; MG/100ML
9 INJECTION, SOLUTION INTRAVENOUS CONTINUOUS
Status: DISCONTINUED | OUTPATIENT
Start: 2018-12-17 | End: 2018-12-17 | Stop reason: HOSPADM

## 2018-12-17 RX ORDER — PROMETHAZINE HYDROCHLORIDE 25 MG/1
25 SUPPOSITORY RECTAL ONCE AS NEEDED
Status: DISCONTINUED | OUTPATIENT
Start: 2018-12-17 | End: 2018-12-17 | Stop reason: HOSPADM

## 2018-12-17 RX ORDER — PROMETHAZINE HYDROCHLORIDE 25 MG/ML
12.5 INJECTION, SOLUTION INTRAMUSCULAR; INTRAVENOUS ONCE AS NEEDED
Status: DISCONTINUED | OUTPATIENT
Start: 2018-12-17 | End: 2018-12-17 | Stop reason: HOSPADM

## 2018-12-17 RX ORDER — BUPIVACAINE HYDROCHLORIDE AND EPINEPHRINE 5; 5 MG/ML; UG/ML
INJECTION, SOLUTION PERINEURAL AS NEEDED
Status: DISCONTINUED | OUTPATIENT
Start: 2018-12-17 | End: 2018-12-17 | Stop reason: HOSPADM

## 2018-12-17 RX ORDER — ACETAMINOPHEN 325 MG/1
650 TABLET ORAL ONCE AS NEEDED
Status: DISCONTINUED | OUTPATIENT
Start: 2018-12-17 | End: 2018-12-17 | Stop reason: HOSPADM

## 2018-12-17 RX ORDER — ONDANSETRON 2 MG/ML
4 INJECTION INTRAMUSCULAR; INTRAVENOUS ONCE AS NEEDED
Status: COMPLETED | OUTPATIENT
Start: 2018-12-17 | End: 2018-12-17

## 2018-12-17 RX ORDER — ROCURONIUM BROMIDE 10 MG/ML
INJECTION, SOLUTION INTRAVENOUS AS NEEDED
Status: DISCONTINUED | OUTPATIENT
Start: 2018-12-17 | End: 2018-12-17 | Stop reason: SURG

## 2018-12-17 RX ORDER — FAMOTIDINE 10 MG/ML
20 INJECTION, SOLUTION INTRAVENOUS ONCE
Status: COMPLETED | OUTPATIENT
Start: 2018-12-17 | End: 2018-12-17

## 2018-12-17 RX ORDER — SODIUM CHLORIDE 0.9 % (FLUSH) 0.9 %
1-10 SYRINGE (ML) INJECTION AS NEEDED
Status: DISCONTINUED | OUTPATIENT
Start: 2018-12-17 | End: 2018-12-17 | Stop reason: HOSPADM

## 2018-12-17 RX ORDER — OXYCODONE AND ACETAMINOPHEN 7.5; 325 MG/1; MG/1
1 TABLET ORAL ONCE AS NEEDED
Status: DISCONTINUED | OUTPATIENT
Start: 2018-12-17 | End: 2018-12-17 | Stop reason: HOSPADM

## 2018-12-17 RX ORDER — HYDROCODONE BITARTRATE AND ACETAMINOPHEN 7.5; 325 MG/1; MG/1
1 TABLET ORAL ONCE AS NEEDED
Status: COMPLETED | OUTPATIENT
Start: 2018-12-17 | End: 2018-12-17

## 2018-12-17 RX ORDER — ONDANSETRON 2 MG/ML
INJECTION INTRAMUSCULAR; INTRAVENOUS AS NEEDED
Status: DISCONTINUED | OUTPATIENT
Start: 2018-12-17 | End: 2018-12-17 | Stop reason: SURG

## 2018-12-17 RX ORDER — OXYCODONE HYDROCHLORIDE AND ACETAMINOPHEN 5; 325 MG/1; MG/1
TABLET ORAL
Qty: 30 TABLET | Refills: 0 | Status: SHIPPED | OUTPATIENT
Start: 2018-12-17 | End: 2019-01-08

## 2018-12-17 RX ORDER — MIDAZOLAM HYDROCHLORIDE 1 MG/ML
2 INJECTION INTRAMUSCULAR; INTRAVENOUS
Status: DISCONTINUED | OUTPATIENT
Start: 2018-12-17 | End: 2018-12-17 | Stop reason: HOSPADM

## 2018-12-17 RX ORDER — SCOLOPAMINE TRANSDERMAL SYSTEM 1 MG/1
1 PATCH, EXTENDED RELEASE TRANSDERMAL ONCE
Status: DISCONTINUED | OUTPATIENT
Start: 2018-12-17 | End: 2018-12-17 | Stop reason: HOSPADM

## 2018-12-17 RX ORDER — DIPHENHYDRAMINE HCL 25 MG
25 CAPSULE ORAL
Status: DISCONTINUED | OUTPATIENT
Start: 2018-12-17 | End: 2018-12-17 | Stop reason: HOSPADM

## 2018-12-17 RX ORDER — DEXAMETHASONE SODIUM PHOSPHATE 10 MG/ML
INJECTION INTRAMUSCULAR; INTRAVENOUS AS NEEDED
Status: DISCONTINUED | OUTPATIENT
Start: 2018-12-17 | End: 2018-12-17 | Stop reason: SURG

## 2018-12-17 RX ORDER — NALOXONE HCL 0.4 MG/ML
0.2 VIAL (ML) INJECTION AS NEEDED
Status: DISCONTINUED | OUTPATIENT
Start: 2018-12-17 | End: 2018-12-17 | Stop reason: HOSPADM

## 2018-12-17 RX ORDER — PROPOFOL 10 MG/ML
VIAL (ML) INTRAVENOUS AS NEEDED
Status: DISCONTINUED | OUTPATIENT
Start: 2018-12-17 | End: 2018-12-17 | Stop reason: SURG

## 2018-12-17 RX ORDER — EPHEDRINE SULFATE 50 MG/ML
5 INJECTION, SOLUTION INTRAVENOUS ONCE AS NEEDED
Status: DISCONTINUED | OUTPATIENT
Start: 2018-12-17 | End: 2018-12-17 | Stop reason: HOSPADM

## 2018-12-17 RX ORDER — LIDOCAINE HYDROCHLORIDE 10 MG/ML
0.5 INJECTION, SOLUTION EPIDURAL; INFILTRATION; INTRACAUDAL; PERINEURAL ONCE AS NEEDED
Status: DISCONTINUED | OUTPATIENT
Start: 2018-12-17 | End: 2018-12-17 | Stop reason: HOSPADM

## 2018-12-17 RX ORDER — FENTANYL CITRATE 50 UG/ML
50 INJECTION, SOLUTION INTRAMUSCULAR; INTRAVENOUS
Status: DISCONTINUED | OUTPATIENT
Start: 2018-12-17 | End: 2018-12-17 | Stop reason: HOSPADM

## 2018-12-17 RX ORDER — CEFAZOLIN SODIUM 2 G/100ML
2 INJECTION, SOLUTION INTRAVENOUS ONCE
Status: COMPLETED | OUTPATIENT
Start: 2018-12-17 | End: 2018-12-17

## 2018-12-17 RX ORDER — LIDOCAINE HYDROCHLORIDE 20 MG/ML
INJECTION, SOLUTION INFILTRATION; PERINEURAL AS NEEDED
Status: DISCONTINUED | OUTPATIENT
Start: 2018-12-17 | End: 2018-12-17 | Stop reason: SURG

## 2018-12-17 RX ORDER — FENTANYL CITRATE 50 UG/ML
INJECTION, SOLUTION INTRAMUSCULAR; INTRAVENOUS AS NEEDED
Status: DISCONTINUED | OUTPATIENT
Start: 2018-12-17 | End: 2018-12-17 | Stop reason: SURG

## 2018-12-17 RX ADMIN — PROPOFOL 200 MG: 10 INJECTION, EMULSION INTRAVENOUS at 12:16

## 2018-12-17 RX ADMIN — FENTANYL CITRATE 100 MCG: 50 INJECTION, SOLUTION INTRAMUSCULAR; INTRAVENOUS at 12:59

## 2018-12-17 RX ADMIN — SODIUM CHLORIDE, POTASSIUM CHLORIDE, SODIUM LACTATE AND CALCIUM CHLORIDE: 600; 310; 30; 20 INJECTION, SOLUTION INTRAVENOUS at 14:00

## 2018-12-17 RX ADMIN — FENTANYL CITRATE 150 MCG: 50 INJECTION, SOLUTION INTRAMUSCULAR; INTRAVENOUS at 12:13

## 2018-12-17 RX ADMIN — KETOROLAC TROMETHAMINE 30 MG: 30 INJECTION, SOLUTION INTRAMUSCULAR; INTRAVENOUS at 13:46

## 2018-12-17 RX ADMIN — DEXAMETHASONE SODIUM PHOSPHATE 8 MG: 10 INJECTION INTRAMUSCULAR; INTRAVENOUS at 12:46

## 2018-12-17 RX ADMIN — HYDROCODONE BITARTRATE AND ACETAMINOPHEN 1 TABLET: 7.5; 325 TABLET ORAL at 15:16

## 2018-12-17 RX ADMIN — FAMOTIDINE 20 MG: 10 INJECTION, SOLUTION INTRAVENOUS at 10:16

## 2018-12-17 RX ADMIN — SUGAMMADEX 200 MG: 100 INJECTION, SOLUTION INTRAVENOUS at 13:49

## 2018-12-17 RX ADMIN — HYDROMORPHONE HYDROCHLORIDE 1 MG: 2 INJECTION INTRAMUSCULAR; INTRAVENOUS; SUBCUTANEOUS at 13:59

## 2018-12-17 RX ADMIN — ONDANSETRON 4 MG: 2 INJECTION INTRAMUSCULAR; INTRAVENOUS at 13:45

## 2018-12-17 RX ADMIN — ROCURONIUM BROMIDE 40 MG: 10 INJECTION INTRAVENOUS at 12:16

## 2018-12-17 RX ADMIN — SODIUM CHLORIDE, POTASSIUM CHLORIDE, SODIUM LACTATE AND CALCIUM CHLORIDE 9 ML/HR: 600; 310; 30; 20 INJECTION, SOLUTION INTRAVENOUS at 10:16

## 2018-12-17 RX ADMIN — SCOPALAMINE 1 PATCH: 1 PATCH, EXTENDED RELEASE TRANSDERMAL at 10:13

## 2018-12-17 RX ADMIN — FENTANYL CITRATE 50 MCG: 50 INJECTION, SOLUTION INTRAMUSCULAR; INTRAVENOUS at 14:45

## 2018-12-17 RX ADMIN — ONDANSETRON 4 MG: 2 INJECTION INTRAMUSCULAR; INTRAVENOUS at 14:44

## 2018-12-17 RX ADMIN — MIDAZOLAM HYDROCHLORIDE 2 MG: 2 INJECTION, SOLUTION INTRAMUSCULAR; INTRAVENOUS at 10:16

## 2018-12-17 RX ADMIN — CEFAZOLIN SODIUM 2 G: 2 INJECTION, SOLUTION INTRAVENOUS at 12:21

## 2018-12-17 RX ADMIN — LIDOCAINE HYDROCHLORIDE 100 MG: 20 INJECTION, SOLUTION INFILTRATION; PERINEURAL at 12:16

## 2018-12-17 NOTE — H&P
Subjective      Doreen Stephenson is a 28 y.o. female who returns after undergoing a ventral hernia repair with component separation and mesh on 9/10/2018.      History of Present Illness      The patient is recovering well with no significant postop symptoms.  She is having minimal abdominal pain in the right lower quadrant at the previous ileostomy site.  She has also noticed a bulge in this area.  She has a good appetite and normal bowel function.  Her energy level is good.       Review of Systems   Constitutional: Negative for activity change, appetite change, fatigue and fever.   Respiratory: Negative for chest tightness and shortness of breath.    Cardiovascular: Negative for chest pain and palpitations.   Gastrointestinal: Positive for abdominal pain. Negative for constipation, diarrhea and nausea.   Skin: Negative for rash and wound.   Psychiatric/Behavioral: Negative for agitation and confusion.            Objective      Physical Exam   Constitutional:  Non-toxic appearance. She does not appear ill. No distress.   Pulmonary/Chest: Effort normal. No respiratory distress.   Abdominal: Soft. Normal appearance. There is no tenderness. A hernia is present. Hernia confirmed positive in the ventral area (There was a ventral hernia in the right lower quadrant at the previous ileostomy site).   Neurological: She is alert.   Skin:   Incision: intact with no evidence of infection.   Psychiatric: She has a normal mood and affect. Her behavior is normal.            Assessment/Plan      The encounter diagnosis was Postoperative visit.     The patient is recovering well from her ventral hernia repair with component separation and mesh regarding the midline defect.  She has developed a recurrent hernia at the previous ileostomy site.  She will require a ventral hernia repair for this recurrent hernia.  The patient understands the indications, alternatives, risks, and benefits of the procedure and wishes to proceed.

## 2018-12-17 NOTE — ANESTHESIA PREPROCEDURE EVALUATION
Anesthesia Evaluation     history of anesthetic complications: PONV  NPO Solid Status: > 8 hours  NPO Liquid Status: > 8 hours           Airway   Mallampati: II  TM distance: >3 FB  Neck ROM: full  no difficulty expected  Dental - normal exam     Pulmonary - normal exam   Cardiovascular - normal exam        Neuro/Psych  (+) psychiatric history Anxiety and Depression,     GI/Hepatic/Renal/Endo      Musculoskeletal     Abdominal  - normal exam   Substance History      OB/GYN          Other                        Anesthesia Plan    ASA 2     general     intravenous induction   Anesthetic plan, all risks, benefits, and alternatives have been provided, discussed and informed consent has been obtained with: patient.

## 2018-12-17 NOTE — ANESTHESIA POSTPROCEDURE EVALUATION
Patient: Doreen Stephenson    Procedure Summary     Date:  12/17/18 Room / Location:  [facility] OR  / [facility] MAIN OR    Anesthesia Start:  1211 Anesthesia Stop:  1406    Procedure:  VENTRAL/INCISIONAL HERNIA REPAIR (N/A Abdomen) Diagnosis:       Ventral hernia without obstruction or gangrene      (Ventral hernia without obstruction or gangrene [K43.9])    Surgeon:  Shawn Villasenor Jr., MD Provider:  Merari Hatch MD    Anesthesia Type:  general ASA Status:  2          Anesthesia Type: general  Last vitals  BP   131/88 (12/17/18 1540)   Temp   37.3 °C (99.1 °F) (12/17/18 1403)   Pulse   67 (12/17/18 1540)   Resp   16 (12/17/18 1540)     SpO2   96 % (12/17/18 1540)     Post Anesthesia Care and Evaluation    Patient location during evaluation: PHASE II  Anesthetic complications: No anesthetic complications

## 2018-12-17 NOTE — ANESTHESIA PROCEDURE NOTES
ANESTHESIA INTUBATION  Urgency: elective      General Information and Staff    Patient location during procedure: OR  Anesthesiologist: Merari Hatch MD  CRNA: Deja Frances CRNA    Indications and Patient Condition  Indications for airway management: airway protection    Preoxygenated: yes  Mask difficulty assessment: 1 - vent by mask    Final Airway Details  Final airway type: endotracheal airway      Successful airway: ETT  Cuffed: yes   Successful intubation technique: direct laryngoscopy  Facilitating devices/methods: intubating stylet  Endotracheal tube insertion site: oral  Blade: Bunn  Blade size: 2  ETT size (mm): 7.0  Cormack-Lehane Classification: grade I - full view of glottis  Placement verified by: chest auscultation   Cuff volume (mL): 7  Measured from: lips  ETT to lips (cm): 21  Number of attempts at approach: 1    Additional Comments  SIVI.  EYES TAPED CLOSED PRIOR TO DL.  INTUBATION AS CHARTED ABOVE.  APPEARS ATRAUMATIC.  NO CHANGE TO DENTITION. +ETCO2. +BBS. +CR.

## 2018-12-17 NOTE — OP NOTE
Surgeon: Shawn Villasenor Jr., M.D.    Assistant: Shawn Villasenor M.D.    Pre-Operative Diagnosis:     Ventral hernia without obstruction or gangrene [K43.9]    Post-Operative Diagnosis:    Post-Op Diagnosis Codes:     * Ventral hernia without obstruction or gangrene [K43.9]    Procedure Performed:     Repair of a recurrent ventral incisional hernia    Indications:     The patient is a pleasant 28-year-old female who is had a previous ileostomy and is developed a recurrent ileostomy site.  She had a previous ventral hernia that was repaired with repair of the ileostomy hernia as well.  The ventral hernia repair is intact but she has developed a recurrence at the ileostomy site.  She presents for repair of a recurrent ventral incisional hernia with mesh.  The patient understands the indications, alternatives, risks, and benefits of the procedure and wishes to proceed.    Procedure:     The patient was identified and taken to the operating room where she was placed in the supine position on the operating table.  She underwent general endotracheal anesthesia and was appropriately monitored throughout the case by the anesthesia personnel.  Her abdomen was prepped and draped in the standard surgical fashion.  The previous ileostomy site was opened with a scalpel carried through the skin into the subcutaneous tissue.  The subcutaneous tissue was divided using Bovie electrocautery down to a hernia which was freed from surrounding attachments.  It was mobilized down to the fascia where it was incised and completely reduced.  The hernia was involving the lateral oblique musculature.  It was completely reduced and a plane was created beneath the external oblique aponeurosis for the mesh.  A piece of Bard polypropylene soft mesh was selected and placed in a subfascial position where it was secured with 0 Vicryl sutures along its periphery.  The fascia was then reapproximated over the mesh using 0 PDS sutures in an interrupted  fashion.  Another piece of mesh was placed as an onlay and secured with the 0 Vicryl suture.  The entire area was infiltrated with 0.5% Marcaine with epinephrine and Exparel.  The subcutaneous tissues reapproximated using 3-0 Vicryl suture running fashion.  The skin was closed with a 4-0 Monocryl in a subcuticular fashion followed by Mastisol and Steri-Strips.  A sterile dressing was applied.  The sponge, needle, and instrument counts were correct at the end of the case.  The patient tolerated procedure well and was transferred to the recovery room in stable condition.    Estimated Blood Loss:      10 mL    Specimens:       Order Name Source Comment Collection Info Order Time   BASIC METABOLIC PANEL   Collected By: Thang Freeman RN 12/17/2018  9:38 AM       Shawn Villasenor Jr., M.D.

## 2018-12-26 ENCOUNTER — PATIENT MESSAGE (OUTPATIENT)
Dept: SURGERY | Facility: CLINIC | Age: 28
End: 2018-12-26

## 2018-12-26 ENCOUNTER — TELEPHONE (OUTPATIENT)
Dept: SURGERY | Facility: CLINIC | Age: 28
End: 2018-12-26

## 2018-12-26 NOTE — TELEPHONE ENCOUNTER
Patient sent the below message via [employer] email. There is a picture attached to the patient email in the encounter tab.   Recommendations?    Regarding: Non-Urgent Medical Question  Contact: [phone]  ----- Message from Mychart, Generic sent at 12/26/2018 11:46 AM EST -----    I had hernia repair surgery on Dec. 17th and I'm having some swelling issues. I'm pretty sure it's not infection or anything. Is this normal?

## 2019-01-08 ENCOUNTER — OFFICE VISIT (OUTPATIENT)
Dept: SURGERY | Facility: CLINIC | Age: 29
End: 2019-01-08

## 2019-01-08 VITALS — BODY MASS INDEX: 32.9 KG/M2 | HEART RATE: 86 BPM | OXYGEN SATURATION: 98 % | WEIGHT: 222.8 LBS

## 2019-01-08 DIAGNOSIS — Z48.89 POSTOPERATIVE VISIT: Primary | ICD-10-CM

## 2019-01-08 PROCEDURE — 99024 POSTOP FOLLOW-UP VISIT: CPT | Performed by: SURGERY

## 2019-01-08 NOTE — PROGRESS NOTES
Subjective   Doreen Stephenson is a 28 y.o. female who returns to the office after undergoing a repair of a recurrent ventral hernia on 12/17/2018.     History of Present Illness     The patient is recovering well with no significant postop symptoms.  She is having no abdominal pain.  She has a good appetite and normal bowel function.  Her energy level is good.      Review of Systems   Constitutional: Negative for activity change, appetite change, fatigue and fever.   Respiratory: Negative for chest tightness and shortness of breath.    Cardiovascular: Negative for chest pain and palpitations.   Gastrointestinal: Negative for abdominal pain, constipation, diarrhea and nausea.   Skin: Negative for rash and wound.   Psychiatric/Behavioral: Negative for agitation and confusion.       Objective   Physical Exam   Constitutional:  Non-toxic appearance. She does not appear ill. No distress.   Pulmonary/Chest: Effort normal. No respiratory distress.   Abdominal: Soft. Normal appearance. There is no tenderness.   Neurological: She is alert.   Skin:   Incision: intact with no evidence of infection.  There is a seroma present.   Psychiatric: She has a normal mood and affect. Her behavior is normal.       Assessment/Plan   The encounter diagnosis was Postoperative visit.    The patient is recovering well from the repair of a recurrent ventral hernia.  A seroma is present but is uncomplicated.  She will be released to return to work next week but was advised to avoid heavy lifting for another 3 weeks.

## 2019-02-11 ENCOUNTER — APPOINTMENT (OUTPATIENT)
Dept: CT IMAGING | Facility: HOSPITAL | Age: 29
End: 2019-02-11

## 2019-02-11 ENCOUNTER — HOSPITAL ENCOUNTER (EMERGENCY)
Facility: HOSPITAL | Age: 29
Discharge: HOME | End: 2019-02-11
Attending: EMERGENCY MEDICINE | Admitting: EMERGENCY MEDICINE

## 2019-02-11 VITALS
HEART RATE: 81 BPM | DIASTOLIC BLOOD PRESSURE: 86 MMHG | RESPIRATION RATE: 17 BRPM | HEIGHT: 69 IN | TEMPERATURE: 99.2 F | BODY MASS INDEX: 29.62 KG/M2 | OXYGEN SATURATION: 99 % | SYSTOLIC BLOOD PRESSURE: 139 MMHG | WEIGHT: 200 LBS

## 2019-02-11 DIAGNOSIS — R10.84 GENERALIZED ABDOMINAL PAIN: Primary | ICD-10-CM

## 2019-02-11 DIAGNOSIS — S30.1XXA ABDOMINAL WALL SEROMA, INITIAL ENCOUNTER: ICD-10-CM

## 2019-02-11 LAB
ALBUMIN SERPL-MCNC: 3.9 G/DL (ref 3.5–5.2)
ALBUMIN/GLOB SERPL: 1 G/DL
ALP SERPL-CCNC: 120 U/L (ref 39–117)
ALT SERPL W P-5'-P-CCNC: 17 U/L (ref 1–33)
ANION GAP SERPL CALCULATED.3IONS-SCNC: 16.3 MMOL/L
AST SERPL-CCNC: 21 U/L (ref 1–32)
BASOPHILS # BLD AUTO: 0.04 10*3/MM3 (ref 0–0.2)
BASOPHILS NFR BLD AUTO: 0.6 % (ref 0–1.5)
BILIRUB SERPL-MCNC: 0.2 MG/DL (ref 0.1–1.2)
BILIRUB UR QL STRIP: NEGATIVE
BUN BLD-MCNC: 8 MG/DL (ref 6–20)
BUN/CREAT SERPL: 10.3 (ref 7–25)
CALCIUM SPEC-SCNC: 9.5 MG/DL (ref 8.6–10.5)
CHLORIDE SERPL-SCNC: 105 MMOL/L (ref 98–107)
CLARITY UR: CLEAR
CO2 SERPL-SCNC: 22.7 MMOL/L (ref 22–29)
COLOR UR: YELLOW
CREAT BLD-MCNC: 0.78 MG/DL (ref 0.57–1)
DEPRECATED RDW RBC AUTO: 46.2 FL (ref 37–54)
EOSINOPHIL # BLD AUTO: 0.09 10*3/MM3 (ref 0–0.4)
EOSINOPHIL NFR BLD AUTO: 1.2 % (ref 0.3–6.2)
ERYTHROCYTE [DISTWIDTH] IN BLOOD BY AUTOMATED COUNT: 14.8 % (ref 12.3–15.4)
GFR SERPL CREATININE-BSD FRML MDRD: 87 ML/MIN/1.73
GLOBULIN UR ELPH-MCNC: 4 GM/DL
GLUCOSE BLD-MCNC: 84 MG/DL (ref 65–99)
GLUCOSE UR STRIP-MCNC: NEGATIVE MG/DL
HCG SERPL QL: NEGATIVE
HCT VFR BLD AUTO: 38.4 % (ref 34–46.6)
HGB BLD-MCNC: 11.8 G/DL (ref 12–15.9)
HGB UR QL STRIP.AUTO: NEGATIVE
IMM GRANULOCYTES # BLD AUTO: 0.04 10*3/MM3 (ref 0–0.05)
IMM GRANULOCYTES NFR BLD AUTO: 0.6 % (ref 0–0.5)
KETONES UR QL STRIP: NEGATIVE
LEUKOCYTE ESTERASE UR QL STRIP.AUTO: NEGATIVE
LIPASE SERPL-CCNC: 35 U/L (ref 13–60)
LYMPHOCYTES # BLD AUTO: 1.2 10*3/MM3 (ref 0.7–3.1)
LYMPHOCYTES NFR BLD AUTO: 16.5 % (ref 19.6–45.3)
MCH RBC QN AUTO: 26.2 PG (ref 26.6–33)
MCHC RBC AUTO-ENTMCNC: 30.7 G/DL (ref 31.5–35.7)
MCV RBC AUTO: 85.3 FL (ref 79–97)
MONOCYTES # BLD AUTO: 0.43 10*3/MM3 (ref 0.1–0.9)
MONOCYTES NFR BLD AUTO: 5.9 % (ref 5–12)
NEUTROPHILS # BLD AUTO: 5.47 10*3/MM3 (ref 1.4–7)
NEUTROPHILS NFR BLD AUTO: 75.2 % (ref 42.7–76)
NITRITE UR QL STRIP: NEGATIVE
NRBC BLD AUTO-RTO: 0 /100 WBC (ref 0–0)
PH UR STRIP.AUTO: 5.5 [PH] (ref 5–8)
PLATELET # BLD AUTO: 306 10*3/MM3 (ref 140–450)
PMV BLD AUTO: 10.1 FL (ref 6–12)
POTASSIUM BLD-SCNC: 4 MMOL/L (ref 3.5–5.2)
PROT SERPL-MCNC: 7.9 G/DL (ref 6–8.5)
PROT UR QL STRIP: NEGATIVE
RBC # BLD AUTO: 4.5 10*6/MM3 (ref 3.77–5.28)
SODIUM BLD-SCNC: 144 MMOL/L (ref 136–145)
SP GR UR STRIP: 1.01 (ref 1–1.03)
UROBILINOGEN UR QL STRIP: NORMAL
WBC NRBC COR # BLD: 7.27 10*3/MM3 (ref 3.4–10.8)

## 2019-02-11 PROCEDURE — 96376 TX/PRO/DX INJ SAME DRUG ADON: CPT

## 2019-02-11 PROCEDURE — 96361 HYDRATE IV INFUSION ADD-ON: CPT

## 2019-02-11 PROCEDURE — 81003 URINALYSIS AUTO W/O SCOPE: CPT | Performed by: EMERGENCY MEDICINE

## 2019-02-11 PROCEDURE — 25010000002 IOPAMIDOL 61 % SOLUTION: Performed by: EMERGENCY MEDICINE

## 2019-02-11 PROCEDURE — 84703 CHORIONIC GONADOTROPIN ASSAY: CPT | Performed by: EMERGENCY MEDICINE

## 2019-02-11 PROCEDURE — 25010000002 ONDANSETRON PER 1 MG: Performed by: EMERGENCY MEDICINE

## 2019-02-11 PROCEDURE — 80053 COMPREHEN METABOLIC PANEL: CPT | Performed by: EMERGENCY MEDICINE

## 2019-02-11 PROCEDURE — 96375 TX/PRO/DX INJ NEW DRUG ADDON: CPT

## 2019-02-11 PROCEDURE — 74177 CT ABD & PELVIS W/CONTRAST: CPT

## 2019-02-11 PROCEDURE — 96374 THER/PROPH/DIAG INJ IV PUSH: CPT

## 2019-02-11 PROCEDURE — 83690 ASSAY OF LIPASE: CPT | Performed by: EMERGENCY MEDICINE

## 2019-02-11 PROCEDURE — 99284 EMERGENCY DEPT VISIT MOD MDM: CPT

## 2019-02-11 PROCEDURE — 85025 COMPLETE CBC W/AUTO DIFF WBC: CPT | Performed by: EMERGENCY MEDICINE

## 2019-02-11 PROCEDURE — 25010000002 HYDROMORPHONE PER 4 MG: Performed by: EMERGENCY MEDICINE

## 2019-02-11 RX ORDER — ONDANSETRON 2 MG/ML
4 INJECTION INTRAMUSCULAR; INTRAVENOUS ONCE
Status: COMPLETED | OUTPATIENT
Start: 2019-02-11 | End: 2019-02-11

## 2019-02-11 RX ORDER — ONDANSETRON 4 MG/1
4 TABLET, FILM COATED ORAL EVERY 8 HOURS PRN
Qty: 10 TABLET | Refills: 0 | Status: SHIPPED | OUTPATIENT
Start: 2019-02-11 | End: 2019-04-19 | Stop reason: SDUPTHER

## 2019-02-11 RX ORDER — HYDROMORPHONE HYDROCHLORIDE 1 MG/ML
0.5 INJECTION, SOLUTION INTRAMUSCULAR; INTRAVENOUS; SUBCUTANEOUS ONCE
Status: COMPLETED | OUTPATIENT
Start: 2019-02-11 | End: 2019-02-11

## 2019-02-11 RX ORDER — HYDROCODONE BITARTRATE AND ACETAMINOPHEN 10; 325 MG/1; MG/1
1 TABLET ORAL EVERY 4 HOURS PRN
Qty: 10 TABLET | Refills: 0 | Status: SHIPPED | OUTPATIENT
Start: 2019-02-11 | End: 2020-07-13

## 2019-02-11 RX ADMIN — ONDANSETRON HYDROCHLORIDE 4 MG: 2 SOLUTION INTRAMUSCULAR; INTRAVENOUS at 18:54

## 2019-02-11 RX ADMIN — HYDROMORPHONE HYDROCHLORIDE 0.5 MG: 1 INJECTION, SOLUTION INTRAMUSCULAR; INTRAVENOUS; SUBCUTANEOUS at 18:55

## 2019-02-11 RX ADMIN — IOPAMIDOL 85 ML: 612 INJECTION, SOLUTION INTRAVENOUS at 19:21

## 2019-02-11 RX ADMIN — SODIUM CHLORIDE, POTASSIUM CHLORIDE, SODIUM LACTATE AND CALCIUM CHLORIDE 1000 ML: 600; 310; 30; 20 INJECTION, SOLUTION INTRAVENOUS at 18:53

## 2019-02-11 RX ADMIN — HYDROMORPHONE HYDROCHLORIDE 0.5 MG: 1 INJECTION, SOLUTION INTRAMUSCULAR; INTRAVENOUS; SUBCUTANEOUS at 20:51

## 2019-02-11 RX ADMIN — ONDANSETRON HYDROCHLORIDE 4 MG: 2 SOLUTION INTRAMUSCULAR; INTRAVENOUS at 20:51

## 2019-02-11 NOTE — ED PROVIDER NOTES
" EMERGENCY DEPARTMENT ENCOUNTER    CHIEF COMPLAINT  Chief Complaint: abdominal pain  History given by: pt  History limited by: none  Room Number: 13/13  PMD: Chano Ortiz MD  GI: Dr Bowser    HPI:  Pt is a 28 y.o. female who presents complaining of severe intermittent abdominal pains that started three days ago but have gotten progressively worse and are aggravated by having a bowel movement. Pt admits to increased gas, abdominal distention, diarrhea, and nausea but denies fevers, chills, and vomiting. Pt reports this is similar to her Chron's flare ups.     Duration:  Three days  Onset: gradual  Timing: intermittent  Location: abdomen  Radiation: none  Quality: \"pain\"  Intensity/Severity: severe  Progression: unchanged  Associated Symptoms: increased gas, abdominal distention, diarrhea, and nausea  Aggravating Factors: having a bowel movement  Alleviating Factors: none  Previous Episodes: Pt has a history of Chron's and feels this is similar to her flare ups  Treatment before arrival: none    PAST MEDICAL HISTORY  Active Ambulatory Problems     Diagnosis Date Noted   • Ventral hernia without obstruction or gangrene 08/10/2018     Resolved Ambulatory Problems     Diagnosis Date Noted   • No Resolved Ambulatory Problems     Past Medical History:   Diagnosis Date   • Anemia    • Anxiety    • Crohn's disease (CMS/HCC)    • Depression    • Incisional hernia    • PONV (postoperative nausea and vomiting)    • Ventral hernia        PAST SURGICAL HISTORY  Past Surgical History:   Procedure Laterality Date   • COLON RESECTION SMALL BOWEL N/A 08/31/2017    Dr. Rockwell   • COLON RESECTION WITH COLOSTOMY N/A 09/04/2017    Due to anastomosis infection-Dr. Rockwell   • COLONOSCOPY N/A 2016    Dr. Bowser   • ILEOSTOMY CLOSURE N/A 12/12/2017    Dr. Rockwell   • VENTRAL/INCISIONAL HERNIA REPAIR N/A 9/10/2018    Procedure: VENTRAL/INCISIONAL HERNIA REPAIR WITH MESH AND COMPONENT SEPARATION;  Surgeon: Shawn Villasenor Jr., MD;  " Location: [facility] OR;  Service: General   • VENTRAL/INCISIONAL HERNIA REPAIR N/A 12/17/2018    Procedure: VENTRAL/INCISIONAL HERNIA REPAIR;  Surgeon: Shawn Villasenor Jr., MD;  Location: [facility] OR;  Service: General   • WISDOM TOOTH EXTRACTION         FAMILY HISTORY  Family History   Problem Relation Age of Onset   • Malig Hyperthermia Neg Hx        SOCIAL HISTORY  Social History     Socioeconomic History   • Marital status: Single     Spouse name: Not on file   • Number of children: Not on file   • Years of education: Not on file   • Highest education level: Not on file   Social Needs   • Financial resource strain: Not on file   • Food insecurity - worry: Not on file   • Food insecurity - inability: Not on file   • Transportation needs - medical: Not on file   • Transportation needs - non-medical: Not on file   Occupational History   • Not on file   Tobacco Use   • Smoking status: Never Smoker   • Smokeless tobacco: Never Used   Substance and Sexual Activity   • Alcohol use: No   • Drug use: No   • Sexual activity: Defer   Other Topics Concern   • Not on file   Social History Narrative   • Not on file       ALLERGIES  Morphine    REVIEW OF SYSTEMS  Review of Systems   Constitutional: Negative for fever.   HENT: Negative for sore throat.    Eyes: Negative.    Respiratory: Negative for cough and shortness of breath.    Cardiovascular: Negative for chest pain.   Gastrointestinal: Positive for abdominal distention, abdominal pain and nausea. Negative for diarrhea and vomiting.        Increased gas   Genitourinary: Negative for dysuria.   Musculoskeletal: Negative for neck pain.   Skin: Negative for rash.   Neurological: Negative for weakness, numbness and headaches.   Hematological: Negative.    Psychiatric/Behavioral: Negative.    All other systems reviewed and are negative.      PHYSICAL EXAM  ED Triage Vitals   Temp Heart Rate Resp BP SpO2   02/11/19 1602 02/11/19 1602 02/11/19 1602 02/11/19 1659 02/11/19  1602   99.2 °F (37.3 °C) (!) 125 18 137/99 98 %      Temp src Heart Rate Source Patient Position BP Location FiO2 (%)   02/11/19 1602 02/11/19 1602 -- -- --   Tympanic Monitor          Physical Exam   Constitutional: She is oriented to person, place, and time. No distress.   HENT:   Head: Normocephalic and atraumatic.   Mouth/Throat: Oropharynx is clear and moist.   Eyes: EOM are normal. Pupils are equal, round, and reactive to light.   Neck: Normal range of motion. Neck supple.   Cardiovascular: Normal rate, regular rhythm and normal heart sounds.   Pulmonary/Chest: Effort normal and breath sounds normal. No respiratory distress.   Abdominal: Soft. Bowel sounds are hypoactive. There is tenderness (diffuse). There is guarding. There is no rebound.   Pt has multiple well healed incisions well   Musculoskeletal: Normal range of motion. She exhibits no edema.   Neurological: She is alert and oriented to person, place, and time. She has normal sensation and normal strength.   Skin: Skin is warm and dry. No rash noted.   Psychiatric: Mood and affect normal.   Nursing note and vitals reviewed.      LAB RESULTS  Lab Results (last 24 hours)     Procedure Component Value Units Date/Time    Urinalysis With Microscopic If Indicated (No Culture) - Urine, Clean Catch [[id number]]  (Normal) Collected:  02/11/19 1807    Specimen:  Urine, Clean Catch Updated:  02/11/19 1827     Color, UA Yellow     Appearance, UA Clear     pH, UA 5.5     Specific Gravity, UA 1.015     Glucose, UA Negative     Ketones, UA Negative     Bilirubin, UA Negative     Blood, UA Negative     Protein, UA Negative     Leuk Esterase, UA Negative     Nitrite, UA Negative     Urobilinogen, UA 0.2 E.U./dL    Narrative:       Urine microscopic not indicated.    Comprehensive Metabolic Panel [[id number]]  (Abnormal) Collected:  02/11/19 1838    Specimen:  Blood Updated:  02/11/19 1910     Glucose 84 mg/dL      BUN 8 mg/dL      Creatinine 0.78 mg/dL      Sodium 144  mmol/L      Potassium 4.0 mmol/L      Chloride 105 mmol/L      CO2 22.7 mmol/L      Calcium 9.5 mg/dL      Total Protein 7.9 g/dL      Albumin 3.90 g/dL      ALT (SGPT) 17 U/L      AST (SGOT) 21 U/L      Comment: Specimen hemolyzed.  Results may be affected.        Alkaline Phosphatase 120 U/L      Total Bilirubin 0.2 mg/dL      eGFR Non African Amer 87 mL/min/1.73      Globulin 4.0 gm/dL      A/G Ratio 1.0 g/dL      BUN/Creatinine Ratio 10.3     Anion Gap 16.3 mmol/L     Lipase [[id number]]  (Normal) Collected:  02/11/19 1838    Specimen:  Blood Updated:  02/11/19 1909     Lipase 35 U/L     hCG, Serum, Qualitative [[id number]]  (Normal) Collected:  02/11/19 1838    Specimen:  Blood Updated:  02/11/19 1904     HCG Qualitative Negative    CBC & Differential [[id number]] Collected:  02/11/19 1839    Specimen:  Blood Updated:  02/11/19 1855    Narrative:       The following orders were created for panel order CBC & Differential.  Procedure                               Abnormality         Status                     ---------                               -----------         ------                     CBC Auto Differential[[id number]]        Abnormal            Final result                 Please view results for these tests on the individual orders.    CBC Auto Differential [[id number]]  (Abnormal) Collected:  02/11/19 1839    Specimen:  Blood Updated:  02/11/19 1855     WBC 7.27 10*3/mm3      RBC 4.50 10*6/mm3      Hemoglobin 11.8 g/dL      Hematocrit 38.4 %      MCV 85.3 fL      MCH 26.2 pg      MCHC 30.7 g/dL      RDW 14.8 %      RDW-SD 46.2 fl      MPV 10.1 fL      Platelets 306 10*3/mm3      Neutrophil % 75.2 %      Lymphocyte % 16.5 %      Monocyte % 5.9 %      Eosinophil % 1.2 %      Basophil % 0.6 %      Immature Grans % 0.6 %      Neutrophils, Absolute 5.47 10*3/mm3      Lymphocytes, Absolute 1.20 10*3/mm3      Monocytes, Absolute 0.43 10*3/mm3      Eosinophils, Absolute 0.09 10*3/mm3      Basophils, Absolute 0.04  10*3/mm3      Immature Grans, Absolute 0.04 10*3/mm3      nRBC 0.0 /100 WBC           I ordered the above labs and reviewed the results    RADIOLOGY  CT Abdomen Pelvis With Contrast   Final Result           1. Subcutaneous fluid collection at the right anterior pelvic wall, with   a small marginal enhancement, may represent abscess, versus seroma,   correlate clinically.   2. No acute inflammatory process of bowel is identified. Minimal pelvic   free fluid. Follow-up as indications persist.   3. No obstructive uropathy.       Discussed by telephone with nurse Patricia, for Dr. Crabtree at 1945,   2/11/2019.       This report was finalized on 2/11/2019 7:48 PM by Dr. Hamilton Cali M.D.               I ordered the above noted radiological studies. Interpreted by radiologist.. Reviewed by me in PACS.       PROGRESS AND CONSULTS  ED Course as of Feb 11 2328   Mon Feb 11, 2019 2017 CT Abdomen Pelvis With Contrast [GP]      ED Course User Index  [GP] Christophe Crabtree MD     1744  Ordered labs and abd/pel CT for further viewing. Ordered dilaudid for pain, zofran for nausea, and IV fluids.    2018  Rechecked pt. Pt is resting comfortably. Pt states she had a hernia repair in 12/2018 by  (general surgery). Notified pt of WBC-7.27, hemoglobin-11.8 and CT abd/pelvis-unremarkable besides subcutaneous fluid collection at the right anterior pelvic wall. Pt states this is near the site of her latest hernia repair. Discussed the plan to consult with  (general surgery). Pt understands and agrees with the plan, all questions answered.    2042  Placed call to general surgery for consult.    2046-Ordered dilaudid for pain and Zofran for nausea.     2047  Discussed pt case with  (general surgery) who states area is likely a seroma instead of abscess. He states pt is stable for discharge and outpatient f/u with  (general surgery).      2126  Rechecked pt. Pt is resting comfortably. Notified pt  of my consult with  (general surgery). Informed pt that him and I agree that pt fluid collectin is likely not an abscess. Informed pt that there is no emergent need currently. Discussed the plan to discharge the pt home with prescriptions for Norco and Zofran. I instructed the pt to f/u with  (general surgery) and her GI doctor. Instructed pt to f/u a clear liquid diet upon discharge. Pt understands and agrees with the plan, all questions answered.        MEDICAL DECISION MAKING  Results were reviewed/discussed with the patient and they were also made aware of online access. Pt also made aware that some labs, such as cultures, will not be resulted during ER visit and follow up with PMD is necessary.     MDM  Number of Diagnoses or Management Options     Amount and/or Complexity of Data Reviewed  Clinical lab tests: reviewed and ordered (WBC-7.27  hemoglobin-11.8 )  Tests in the radiology section of CPT®: reviewed and ordered (CT abd/pelvis-subcutaneous fluid collection at the right anterior pelvic wall)  Decide to obtain previous medical records or to obtain history from someone other than the patient: yes (EPIC)  Review and summarize past medical records: yes (Sees Dr Villasenor for history of hiatal hernia. Sees Dr Akil Bowser for history of chron's disease. )  Discuss the patient with other providers: yes ( (general surgery) )  Independent visualization of images, tracings, or specimens: yes           DIAGNOSIS  Final diagnoses:   Generalized abdominal pain   Abdominal wall seroma, initial encounter (CMS/[facility])       DISPOSITION  DISCHARGE    Patient discharged in stable condition.    Reviewed implications of results, diagnosis, meds, responsibility to follow up, warning signs and symptoms of possible worsening, potential complications and reasons to return to ER.    Patient/Family voiced understanding of above instructions.    Discussed plan for discharge, as there is no emergent  indication for admission. Patient referred to primary care provider for BP management due to today's BP. Pt/family is agreeable and understands need for follow up and repeat testing.  Pt is aware that discharge does not mean that nothing is wrong but it indicates no emergency is present that requires admission and they must continue care with follow-up as given below or physician of their choice.     FOLLOW-UP  Shawn Villasenor Jr., MD  [address] [facility] [address]  [facility] [id number] [phone]    In 2 days  For recheck    Akil Bowser MD  [address]  [facility] [place] [facility] [address]  [phone]    In 1 day  For recheck         Medication List      New Prescriptions    HYDROcodone-acetaminophen  MG per tablet  Commonly known as:  NORCO  Take 1 tablet by mouth Every 4 (Four) Hours As Needed for Moderate Pain .     ondansetron 4 MG tablet  Commonly known as:  ZOFRAN  Take 1 tablet by mouth Every 8 (Eight) Hours As Needed for Nausea or   Vomiting.              Latest Documented Vital Signs:  As of 11:28 PM  BP- 139/86 HR- 81 Temp- 99.2 °F (37.3 °C) (Tympanic) O2 sat- 99%    --  Documentation assistance provided by maribeth Shah and Sara De Los Santos for Dr Crabtree.  Information recorded by the scribe was done at my direction and has been verified and validated by me.     Katerin Shah  02/11/19 4734       Sara De Los Santos  02/11/19 1090       Christophe Crabtree MD  02/11/19 6858

## 2019-02-11 NOTE — ED NOTES
Pt c/o generalized abdominal pain that is worse after having a bowel movement. Also c/o bloating, diarrhea. All starting 3 days ago. Denies n/v. Denies blood in stool.     History of crohn's          Cindy Brown RN  02/11/19 2210

## 2019-02-12 NOTE — ED NOTES
Pt being d/c'd home with two new prescriptions. One being a narcotic.      Todd Hatch, RN  02/11/19 5741

## 2019-04-19 ENCOUNTER — APPOINTMENT (OUTPATIENT)
Dept: CT IMAGING | Facility: HOSPITAL | Age: 29
End: 2019-04-19

## 2019-04-19 ENCOUNTER — HOSPITAL ENCOUNTER (EMERGENCY)
Facility: HOSPITAL | Age: 29
Discharge: HOME | End: 2019-04-19
Attending: EMERGENCY MEDICINE | Admitting: EMERGENCY MEDICINE

## 2019-04-19 VITALS
SYSTOLIC BLOOD PRESSURE: 132 MMHG | BODY MASS INDEX: 29.62 KG/M2 | HEIGHT: 69 IN | HEART RATE: 82 BPM | WEIGHT: 200 LBS | DIASTOLIC BLOOD PRESSURE: 92 MMHG | RESPIRATION RATE: 17 BRPM | TEMPERATURE: 98.6 F | OXYGEN SATURATION: 98 %

## 2019-04-19 DIAGNOSIS — R10.84 GENERALIZED ABDOMINAL PAIN: Primary | ICD-10-CM

## 2019-04-19 LAB
ALBUMIN SERPL-MCNC: 3.7 G/DL (ref 3.5–5.2)
ALBUMIN/GLOB SERPL: 1 G/DL
ALP SERPL-CCNC: 95 U/L (ref 39–117)
ALT SERPL W P-5'-P-CCNC: 13 U/L (ref 1–33)
ANION GAP SERPL CALCULATED.3IONS-SCNC: 13.4 MMOL/L
ANISOCYTOSIS BLD QL: ABNORMAL
AST SERPL-CCNC: 20 U/L (ref 1–32)
BASOPHILS # BLD MANUAL: 0.08 10*3/MM3 (ref 0–0.2)
BASOPHILS NFR BLD AUTO: 1 % (ref 0–1.5)
BILIRUB SERPL-MCNC: <0.2 MG/DL (ref 0.2–1.2)
BILIRUB UR QL STRIP: NEGATIVE
BUN BLD-MCNC: 10 MG/DL (ref 6–20)
BUN/CREAT SERPL: 13.9 (ref 7–25)
CALCIUM SPEC-SCNC: 9.4 MG/DL (ref 8.6–10.5)
CHLORIDE SERPL-SCNC: 99 MMOL/L (ref 98–107)
CLARITY UR: CLEAR
CO2 SERPL-SCNC: 24.6 MMOL/L (ref 22–29)
COLOR UR: YELLOW
CREAT BLD-MCNC: 0.72 MG/DL (ref 0.57–1)
DEPRECATED RDW RBC AUTO: 47.8 FL (ref 37–54)
EOSINOPHIL # BLD MANUAL: 0.08 10*3/MM3 (ref 0–0.4)
EOSINOPHIL NFR BLD MANUAL: 1 % (ref 0.3–6.2)
ERYTHROCYTE [DISTWIDTH] IN BLOOD BY AUTOMATED COUNT: 14.9 % (ref 12.3–15.4)
GFR SERPL CREATININE-BSD FRML MDRD: 95 ML/MIN/1.73
GLOBULIN UR ELPH-MCNC: 3.7 GM/DL
GLUCOSE BLD-MCNC: 96 MG/DL (ref 65–99)
GLUCOSE UR STRIP-MCNC: NEGATIVE MG/DL
HCG SERPL QL: NEGATIVE
HCT VFR BLD AUTO: 37.1 % (ref 34–46.6)
HGB BLD-MCNC: 11 G/DL (ref 12–15.9)
HGB UR QL STRIP.AUTO: NEGATIVE
KETONES UR QL STRIP: NEGATIVE
LEUKOCYTE ESTERASE UR QL STRIP.AUTO: NEGATIVE
LIPASE SERPL-CCNC: 50 U/L (ref 13–60)
LYMPHOCYTES # BLD MANUAL: 1.51 10*3/MM3 (ref 0.7–3.1)
LYMPHOCYTES NFR BLD MANUAL: 19.4 % (ref 19.6–45.3)
LYMPHOCYTES NFR BLD MANUAL: 6.1 % (ref 5–12)
MCH RBC QN AUTO: 26.1 PG (ref 26.6–33)
MCHC RBC AUTO-ENTMCNC: 29.6 G/DL (ref 31.5–35.7)
MCV RBC AUTO: 87.9 FL (ref 79–97)
MONOCYTES # BLD AUTO: 0.48 10*3/MM3 (ref 0.1–0.9)
NEUTROPHILS # BLD AUTO: 5.65 10*3/MM3 (ref 1.7–7)
NEUTROPHILS NFR BLD MANUAL: 72.4 % (ref 42.7–76)
NITRITE UR QL STRIP: NEGATIVE
PH UR STRIP.AUTO: 6 [PH] (ref 5–8)
PLAT MORPH BLD: NORMAL
PLATELET # BLD AUTO: 271 10*3/MM3 (ref 140–450)
PMV BLD AUTO: 10 FL (ref 6–12)
POTASSIUM BLD-SCNC: 4.2 MMOL/L (ref 3.5–5.2)
PROT SERPL-MCNC: 7.4 G/DL (ref 6–8.5)
PROT UR QL STRIP: NEGATIVE
RBC # BLD AUTO: 4.22 10*6/MM3 (ref 3.77–5.28)
SODIUM BLD-SCNC: 137 MMOL/L (ref 136–145)
SP GR UR STRIP: 1.02 (ref 1–1.03)
UROBILINOGEN UR QL STRIP: NORMAL
WBC MORPH BLD: NORMAL
WBC NRBC COR # BLD: 7.8 10*3/MM3 (ref 3.4–10.8)

## 2019-04-19 PROCEDURE — 80053 COMPREHEN METABOLIC PANEL: CPT | Performed by: EMERGENCY MEDICINE

## 2019-04-19 PROCEDURE — 83690 ASSAY OF LIPASE: CPT | Performed by: EMERGENCY MEDICINE

## 2019-04-19 PROCEDURE — 25010000002 IOPAMIDOL 61 % SOLUTION: Performed by: EMERGENCY MEDICINE

## 2019-04-19 PROCEDURE — 99284 EMERGENCY DEPT VISIT MOD MDM: CPT

## 2019-04-19 PROCEDURE — 25010000002 ONDANSETRON PER 1 MG: Performed by: EMERGENCY MEDICINE

## 2019-04-19 PROCEDURE — 74177 CT ABD & PELVIS W/CONTRAST: CPT

## 2019-04-19 PROCEDURE — 81003 URINALYSIS AUTO W/O SCOPE: CPT | Performed by: EMERGENCY MEDICINE

## 2019-04-19 PROCEDURE — 96374 THER/PROPH/DIAG INJ IV PUSH: CPT

## 2019-04-19 PROCEDURE — 84703 CHORIONIC GONADOTROPIN ASSAY: CPT | Performed by: EMERGENCY MEDICINE

## 2019-04-19 PROCEDURE — 85025 COMPLETE CBC W/AUTO DIFF WBC: CPT | Performed by: EMERGENCY MEDICINE

## 2019-04-19 PROCEDURE — 85007 BL SMEAR W/DIFF WBC COUNT: CPT | Performed by: EMERGENCY MEDICINE

## 2019-04-19 PROCEDURE — 96375 TX/PRO/DX INJ NEW DRUG ADDON: CPT

## 2019-04-19 PROCEDURE — 25010000002 HYDROMORPHONE PER 4 MG: Performed by: EMERGENCY MEDICINE

## 2019-04-19 RX ORDER — ONDANSETRON 2 MG/ML
4 INJECTION INTRAMUSCULAR; INTRAVENOUS ONCE
Status: COMPLETED | OUTPATIENT
Start: 2019-04-19 | End: 2019-04-19

## 2019-04-19 RX ORDER — DICYCLOMINE HCL 20 MG
20 TABLET ORAL EVERY 6 HOURS PRN
Qty: 10 TABLET | Refills: 0 | Status: SHIPPED | OUTPATIENT
Start: 2019-04-19 | End: 2021-01-18

## 2019-04-19 RX ORDER — ONDANSETRON 4 MG/1
4 TABLET, FILM COATED ORAL EVERY 8 HOURS PRN
Qty: 10 TABLET | Refills: 0 | Status: SHIPPED | OUTPATIENT
Start: 2019-04-19 | End: 2020-07-13

## 2019-04-19 RX ORDER — HYDROMORPHONE HYDROCHLORIDE 1 MG/ML
0.5 INJECTION, SOLUTION INTRAMUSCULAR; INTRAVENOUS; SUBCUTANEOUS ONCE
Status: COMPLETED | OUTPATIENT
Start: 2019-04-19 | End: 2019-04-19

## 2019-04-19 RX ADMIN — HYDROMORPHONE HYDROCHLORIDE 0.5 MG: 1 INJECTION, SOLUTION INTRAMUSCULAR; INTRAVENOUS; SUBCUTANEOUS at 17:32

## 2019-04-19 RX ADMIN — ONDANSETRON 4 MG: 2 INJECTION INTRAMUSCULAR; INTRAVENOUS at 17:32

## 2019-04-19 RX ADMIN — IOPAMIDOL 85 ML: 612 INJECTION, SOLUTION INTRAVENOUS at 18:33

## 2019-07-25 ENCOUNTER — HOSPITAL ENCOUNTER (EMERGENCY)
Facility: HOSPITAL | Age: 29
Discharge: HOME | End: 2019-07-25
Attending: EMERGENCY MEDICINE | Admitting: EMERGENCY MEDICINE

## 2019-07-25 ENCOUNTER — APPOINTMENT (OUTPATIENT)
Dept: CT IMAGING | Facility: HOSPITAL | Age: 29
End: 2019-07-25

## 2019-07-25 VITALS
HEART RATE: 75 BPM | SYSTOLIC BLOOD PRESSURE: 126 MMHG | RESPIRATION RATE: 16 BRPM | OXYGEN SATURATION: 97 % | DIASTOLIC BLOOD PRESSURE: 86 MMHG | BODY MASS INDEX: 31.1 KG/M2 | HEIGHT: 69 IN | TEMPERATURE: 97.9 F | WEIGHT: 210 LBS

## 2019-07-25 DIAGNOSIS — K50.919 CROHN'S DISEASE WITH COMPLICATION, UNSPECIFIED GASTROINTESTINAL TRACT LOCATION (HCC): ICD-10-CM

## 2019-07-25 DIAGNOSIS — R10.13 EPIGASTRIC PAIN: Primary | ICD-10-CM

## 2019-07-25 LAB
ALBUMIN SERPL-MCNC: 4.3 G/DL (ref 3.5–5.2)
ALBUMIN/GLOB SERPL: 1.3 G/DL
ALP SERPL-CCNC: 99 U/L (ref 39–117)
ALT SERPL W P-5'-P-CCNC: 21 U/L (ref 1–33)
ANION GAP SERPL CALCULATED.3IONS-SCNC: 12.6 MMOL/L (ref 5–15)
AST SERPL-CCNC: 22 U/L (ref 1–32)
BASOPHILS # BLD AUTO: 0.02 10*3/MM3 (ref 0–0.2)
BASOPHILS NFR BLD AUTO: 0.3 % (ref 0–1.5)
BILIRUB SERPL-MCNC: 0.2 MG/DL (ref 0.2–1.2)
BILIRUB UR QL STRIP: NEGATIVE
BUN BLD-MCNC: 8 MG/DL (ref 6–20)
BUN/CREAT SERPL: 11.1 (ref 7–25)
CALCIUM SPEC-SCNC: 9.4 MG/DL (ref 8.6–10.5)
CHLORIDE SERPL-SCNC: 100 MMOL/L (ref 98–107)
CLARITY UR: CLEAR
CO2 SERPL-SCNC: 24.4 MMOL/L (ref 22–29)
COLOR UR: YELLOW
CREAT BLD-MCNC: 0.72 MG/DL (ref 0.57–1)
DEPRECATED RDW RBC AUTO: 44.4 FL (ref 37–54)
EOSINOPHIL # BLD AUTO: 0 10*3/MM3 (ref 0–0.4)
EOSINOPHIL NFR BLD AUTO: 0 % (ref 0.3–6.2)
ERYTHROCYTE [DISTWIDTH] IN BLOOD BY AUTOMATED COUNT: 14.6 % (ref 12.3–15.4)
GFR SERPL CREATININE-BSD FRML MDRD: 95 ML/MIN/1.73
GLOBULIN UR ELPH-MCNC: 3.4 GM/DL
GLUCOSE BLD-MCNC: 88 MG/DL (ref 65–99)
GLUCOSE UR STRIP-MCNC: NEGATIVE MG/DL
HCG SERPL QL: NEGATIVE
HCT VFR BLD AUTO: 40.8 % (ref 34–46.6)
HGB BLD-MCNC: 12.5 G/DL (ref 12–15.9)
HGB UR QL STRIP.AUTO: NEGATIVE
HOLD SPECIMEN: NORMAL
IMM GRANULOCYTES # BLD AUTO: 0.04 10*3/MM3 (ref 0–0.05)
IMM GRANULOCYTES NFR BLD AUTO: 0.5 % (ref 0–0.5)
KETONES UR QL STRIP: NEGATIVE
LEUKOCYTE ESTERASE UR QL STRIP.AUTO: NEGATIVE
LIPASE SERPL-CCNC: 16 U/L (ref 13–60)
LYMPHOCYTES # BLD AUTO: 0.77 10*3/MM3 (ref 0.7–3.1)
LYMPHOCYTES NFR BLD AUTO: 9.9 % (ref 19.6–45.3)
MCH RBC QN AUTO: 25.7 PG (ref 26.6–33)
MCHC RBC AUTO-ENTMCNC: 30.6 G/DL (ref 31.5–35.7)
MCV RBC AUTO: 83.8 FL (ref 79–97)
MONOCYTES # BLD AUTO: 0.38 10*3/MM3 (ref 0.1–0.9)
MONOCYTES NFR BLD AUTO: 4.9 % (ref 5–12)
NEUTROPHILS # BLD AUTO: 6.56 10*3/MM3 (ref 1.7–7)
NEUTROPHILS NFR BLD AUTO: 84.4 % (ref 42.7–76)
NITRITE UR QL STRIP: NEGATIVE
NRBC BLD AUTO-RTO: 0 /100 WBC (ref 0–0.2)
PH UR STRIP.AUTO: 5.5 [PH] (ref 5–8)
PLATELET # BLD AUTO: 309 10*3/MM3 (ref 140–450)
PMV BLD AUTO: 9.7 FL (ref 6–12)
POTASSIUM BLD-SCNC: 4.2 MMOL/L (ref 3.5–5.2)
PROT SERPL-MCNC: 7.7 G/DL (ref 6–8.5)
PROT UR QL STRIP: NEGATIVE
RBC # BLD AUTO: 4.87 10*6/MM3 (ref 3.77–5.28)
SODIUM BLD-SCNC: 137 MMOL/L (ref 136–145)
SP GR UR STRIP: 1.02 (ref 1–1.03)
UROBILINOGEN UR QL STRIP: NORMAL
WBC NRBC COR # BLD: 7.77 10*3/MM3 (ref 3.4–10.8)
WHOLE BLOOD HOLD SPECIMEN: NORMAL
WHOLE BLOOD HOLD SPECIMEN: NORMAL

## 2019-07-25 PROCEDURE — 96376 TX/PRO/DX INJ SAME DRUG ADON: CPT

## 2019-07-25 PROCEDURE — 85025 COMPLETE CBC W/AUTO DIFF WBC: CPT | Performed by: EMERGENCY MEDICINE

## 2019-07-25 PROCEDURE — 99284 EMERGENCY DEPT VISIT MOD MDM: CPT

## 2019-07-25 PROCEDURE — 25010000002 HYDROMORPHONE PER 4 MG: Performed by: EMERGENCY MEDICINE

## 2019-07-25 PROCEDURE — 81003 URINALYSIS AUTO W/O SCOPE: CPT | Performed by: EMERGENCY MEDICINE

## 2019-07-25 PROCEDURE — 83690 ASSAY OF LIPASE: CPT | Performed by: EMERGENCY MEDICINE

## 2019-07-25 PROCEDURE — 96374 THER/PROPH/DIAG INJ IV PUSH: CPT

## 2019-07-25 PROCEDURE — 25010000002 ONDANSETRON PER 1 MG: Performed by: EMERGENCY MEDICINE

## 2019-07-25 PROCEDURE — 84703 CHORIONIC GONADOTROPIN ASSAY: CPT | Performed by: EMERGENCY MEDICINE

## 2019-07-25 PROCEDURE — 96361 HYDRATE IV INFUSION ADD-ON: CPT

## 2019-07-25 PROCEDURE — 25010000002 IOPAMIDOL 61 % SOLUTION: Performed by: EMERGENCY MEDICINE

## 2019-07-25 PROCEDURE — 80053 COMPREHEN METABOLIC PANEL: CPT | Performed by: EMERGENCY MEDICINE

## 2019-07-25 PROCEDURE — 74177 CT ABD & PELVIS W/CONTRAST: CPT

## 2019-07-25 PROCEDURE — 96375 TX/PRO/DX INJ NEW DRUG ADDON: CPT

## 2019-07-25 RX ORDER — OXYCODONE HYDROCHLORIDE AND ACETAMINOPHEN 5; 325 MG/1; MG/1
1-2 TABLET ORAL EVERY 6 HOURS PRN
Qty: 12 TABLET | Refills: 0 | Status: SHIPPED | OUTPATIENT
Start: 2019-07-25 | End: 2020-07-13

## 2019-07-25 RX ORDER — METRONIDAZOLE 500 MG/1
500 TABLET ORAL 3 TIMES DAILY
Qty: 30 TABLET | Refills: 0 | Status: SHIPPED | OUTPATIENT
Start: 2019-07-25 | End: 2020-07-13

## 2019-07-25 RX ORDER — HYDROMORPHONE HYDROCHLORIDE 1 MG/ML
0.5 INJECTION, SOLUTION INTRAMUSCULAR; INTRAVENOUS; SUBCUTANEOUS ONCE
Status: COMPLETED | OUTPATIENT
Start: 2019-07-25 | End: 2019-07-25

## 2019-07-25 RX ORDER — ONDANSETRON 2 MG/ML
4 INJECTION INTRAMUSCULAR; INTRAVENOUS ONCE
Status: COMPLETED | OUTPATIENT
Start: 2019-07-25 | End: 2019-07-25

## 2019-07-25 RX ORDER — PREDNISONE 1 MG/1
10 TABLET ORAL 2 TIMES DAILY
COMMUNITY
End: 2020-07-13

## 2019-07-25 RX ORDER — SODIUM CHLORIDE 0.9 % (FLUSH) 0.9 %
10 SYRINGE (ML) INJECTION AS NEEDED
Status: DISCONTINUED | OUTPATIENT
Start: 2019-07-25 | End: 2019-07-25 | Stop reason: HOSPADM

## 2019-07-25 RX ADMIN — IOPAMIDOL 85 ML: 612 INJECTION, SOLUTION INTRAVENOUS at 09:46

## 2019-07-25 RX ADMIN — ONDANSETRON 4 MG: 2 INJECTION INTRAMUSCULAR; INTRAVENOUS at 08:03

## 2019-07-25 RX ADMIN — HYDROMORPHONE HYDROCHLORIDE 0.5 MG: 1 INJECTION, SOLUTION INTRAMUSCULAR; INTRAVENOUS; SUBCUTANEOUS at 08:06

## 2019-07-25 RX ADMIN — HYDROMORPHONE HYDROCHLORIDE 0.5 MG: 1 INJECTION, SOLUTION INTRAMUSCULAR; INTRAVENOUS; SUBCUTANEOUS at 10:29

## 2019-07-25 RX ADMIN — SODIUM CHLORIDE 1000 ML: 9 INJECTION, SOLUTION INTRAVENOUS at 08:02

## 2019-09-12 ENCOUNTER — OFFICE (AMBULATORY)
Dept: URBAN - METROPOLITAN AREA CLINIC 94 | Facility: CLINIC | Age: 29
End: 2019-09-12

## 2019-09-12 VITALS
HEART RATE: 112 BPM | DIASTOLIC BLOOD PRESSURE: 95 MMHG | HEIGHT: 69 IN | WEIGHT: 223 LBS | SYSTOLIC BLOOD PRESSURE: 138 MMHG

## 2019-09-12 DIAGNOSIS — K50.013 CROHN'S DISEASE OF SMALL INTESTINE WITH FISTULA: ICD-10-CM

## 2019-09-12 PROCEDURE — 99204 OFFICE O/P NEW MOD 45 MIN: CPT | Performed by: INTERNAL MEDICINE

## 2019-10-04 ENCOUNTER — LAB REQUISITION (OUTPATIENT)
Dept: LAB | Facility: HOSPITAL | Age: 29
End: 2019-10-04

## 2019-10-04 ENCOUNTER — AMBULATORY SURGICAL CENTER (AMBULATORY)
Dept: URBAN - METROPOLITAN AREA SURGERY 9 | Facility: SURGERY | Age: 29
End: 2019-10-04

## 2019-10-04 DIAGNOSIS — Z00.00 ENCOUNTER FOR GENERAL ADULT MEDICAL EXAMINATION WITHOUT ABNORMAL FINDINGS: ICD-10-CM

## 2019-10-04 DIAGNOSIS — K91.89 OTHER POSTPROCEDURAL COMPLICATIONS AND DISORDERS OF DIGESTIV: ICD-10-CM

## 2019-10-04 DIAGNOSIS — K51.30 ULCERATIVE (CHRONIC) RECTOSIGMOIDITIS WITHOUT COMPLICATIONS: ICD-10-CM

## 2019-10-04 PROCEDURE — 45380 COLONOSCOPY AND BIOPSY: CPT | Performed by: INTERNAL MEDICINE

## 2019-10-04 PROCEDURE — 88305 TISSUE EXAM BY PATHOLOGIST: CPT | Performed by: INTERNAL MEDICINE

## 2019-10-07 LAB
CYTO UR: NORMAL
LAB AP CASE REPORT: NORMAL
LAB AP CLINICAL INFORMATION: NORMAL
PATH REPORT.FINAL DX SPEC: NORMAL
PATH REPORT.GROSS SPEC: NORMAL

## 2019-12-05 ENCOUNTER — OFFICE (AMBULATORY)
Dept: URBAN - METROPOLITAN AREA CLINIC 94 | Facility: CLINIC | Age: 29
End: 2019-12-05

## 2019-12-05 VITALS
HEART RATE: 116 BPM | SYSTOLIC BLOOD PRESSURE: 120 MMHG | WEIGHT: 225 LBS | DIASTOLIC BLOOD PRESSURE: 82 MMHG | HEIGHT: 69 IN

## 2019-12-05 DIAGNOSIS — K50.013 CROHN'S DISEASE OF SMALL INTESTINE WITH FISTULA: ICD-10-CM

## 2019-12-05 PROCEDURE — 99214 OFFICE O/P EST MOD 30 MIN: CPT | Performed by: INTERNAL MEDICINE

## 2020-04-24 ENCOUNTER — EPISODE CHANGES (OUTPATIENT)
Dept: CASE MANAGEMENT | Facility: OTHER | Age: 30
End: 2020-04-24

## 2020-06-25 ENCOUNTER — EPISODE CHANGES (OUTPATIENT)
Dept: CASE MANAGEMENT | Facility: OTHER | Age: 30
End: 2020-06-25

## 2020-07-02 ENCOUNTER — OFFICE (AMBULATORY)
Dept: URBAN - METROPOLITAN AREA CLINIC 94 | Facility: CLINIC | Age: 30
End: 2020-07-02

## 2020-07-02 VITALS — HEIGHT: 69 IN | WEIGHT: 217 LBS

## 2020-07-02 DIAGNOSIS — K50.013 CROHN'S DISEASE OF SMALL INTESTINE WITH FISTULA: ICD-10-CM

## 2020-07-02 PROCEDURE — 99213 OFFICE O/P EST LOW 20 MIN: CPT | Performed by: INTERNAL MEDICINE

## 2020-07-07 ENCOUNTER — HOSPITAL ENCOUNTER (EMERGENCY)
Facility: HOSPITAL | Age: 30
Discharge: HOME | End: 2020-07-07
Attending: EMERGENCY MEDICINE | Admitting: EMERGENCY MEDICINE

## 2020-07-07 ENCOUNTER — APPOINTMENT (OUTPATIENT)
Dept: GENERAL RADIOLOGY | Facility: HOSPITAL | Age: 30
End: 2020-07-07

## 2020-07-07 VITALS
DIASTOLIC BLOOD PRESSURE: 84 MMHG | RESPIRATION RATE: 14 BRPM | OXYGEN SATURATION: 97 % | SYSTOLIC BLOOD PRESSURE: 116 MMHG | HEART RATE: 79 BPM | TEMPERATURE: 97.8 F

## 2020-07-07 DIAGNOSIS — S90.811A FOOT ABRASION, RIGHT, INITIAL ENCOUNTER: ICD-10-CM

## 2020-07-07 DIAGNOSIS — S76.011A HIP STRAIN, RIGHT, INITIAL ENCOUNTER: Primary | ICD-10-CM

## 2020-07-07 PROCEDURE — 73630 X-RAY EXAM OF FOOT: CPT

## 2020-07-07 PROCEDURE — 25010000002 TDAP 5-2.5-18.5 LF-MCG/0.5 SUSPENSION: Performed by: EMERGENCY MEDICINE

## 2020-07-07 PROCEDURE — 73502 X-RAY EXAM HIP UNI 2-3 VIEWS: CPT

## 2020-07-07 PROCEDURE — 73610 X-RAY EXAM OF ANKLE: CPT

## 2020-07-07 PROCEDURE — 90715 TDAP VACCINE 7 YRS/> IM: CPT | Performed by: EMERGENCY MEDICINE

## 2020-07-07 PROCEDURE — 99284 EMERGENCY DEPT VISIT MOD MDM: CPT

## 2020-07-07 PROCEDURE — 90471 IMMUNIZATION ADMIN: CPT | Performed by: EMERGENCY MEDICINE

## 2020-07-07 RX ORDER — HYDROCODONE BITARTRATE AND ACETAMINOPHEN 5; 325 MG/1; MG/1
1 TABLET ORAL ONCE
Status: COMPLETED | OUTPATIENT
Start: 2020-07-07 | End: 2020-07-07

## 2020-07-07 RX ORDER — NAPROXEN SODIUM 550 MG/1
550 TABLET ORAL 2 TIMES DAILY WITH MEALS
Qty: 10 TABLET | Refills: 0 | Status: SHIPPED | OUTPATIENT
Start: 2020-07-07 | End: 2020-07-13

## 2020-07-07 RX ORDER — CYCLOBENZAPRINE HCL 5 MG
5 TABLET ORAL 3 TIMES DAILY PRN
Qty: 15 TABLET | Refills: 0 | Status: SHIPPED | OUTPATIENT
Start: 2020-07-07 | End: 2021-01-18

## 2020-07-07 RX ADMIN — TETANUS TOXOID, REDUCED DIPHTHERIA TOXOID AND ACELLULAR PERTUSSIS VACCINE, ADSORBED 0.5 ML: 5; 2.5; 8; 8; 2.5 SUSPENSION INTRAMUSCULAR at 11:57

## 2020-07-07 RX ADMIN — HYDROCODONE BITARTRATE AND ACETAMINOPHEN 1 TABLET: 5; 325 TABLET ORAL at 11:57
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
## 2020-07-07 NOTE — DISCHARGE INSTRUCTIONS
Ice for pain and swelling, heat for stiffness and soreness, expect the stiffness over several days up to 1 to 2 weeks.  Tylenol for pain in addition to the prescribed pain medications, do not take additional ibuprofen or Aleve while on the prescribed medication.  Drink plenty of fluids, follow-up with your primary care provider and orthopedics as discussed.  Return to the emergency department for worsening symptoms as needed.

## 2020-07-08 ENCOUNTER — EPISODE CHANGES (OUTPATIENT)
Dept: CASE MANAGEMENT | Facility: OTHER | Age: 30
End: 2020-07-08

## 2020-07-13 ENCOUNTER — OFFICE VISIT (OUTPATIENT)
Dept: ORTHOPEDIC SURGERY | Facility: CLINIC | Age: 30
End: 2020-07-13

## 2020-07-13 VITALS — WEIGHT: 210 LBS | HEIGHT: 69 IN | BODY MASS INDEX: 31.1 KG/M2 | TEMPERATURE: 97.1 F

## 2020-07-13 DIAGNOSIS — S93.401A SPRAIN OF RIGHT ANKLE, UNSPECIFIED LIGAMENT, INITIAL ENCOUNTER: Primary | ICD-10-CM

## 2020-07-13 DIAGNOSIS — S90.811A ABRASION, FOOT, RIGHT, INITIAL ENCOUNTER: ICD-10-CM

## 2020-07-13 DIAGNOSIS — S86.301A INJURY OF PERONEAL TENDON OF RIGHT FOOT, INITIAL ENCOUNTER: ICD-10-CM

## 2020-07-13 DIAGNOSIS — M77.41 METATARSALGIA OF RIGHT FOOT: ICD-10-CM

## 2020-07-13 PROCEDURE — 99204 OFFICE O/P NEW MOD 45 MIN: CPT | Performed by: ORTHOPAEDIC SURGERY
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
## 2020-07-14 ENCOUNTER — TELEPHONE (OUTPATIENT)
Dept: ORTHOPEDIC SURGERY | Facility: CLINIC | Age: 30
End: 2020-07-14

## 2020-07-22 ENCOUNTER — TELEPHONE (OUTPATIENT)
Dept: ORTHOPEDIC SURGERY | Facility: CLINIC | Age: 30
End: 2020-07-22

## 2020-07-22 NOTE — TELEPHONE ENCOUNTER
Can you please call her about this and find out what I need to do about it. I will be her tomorrow but will be out on Friday. thanks

## 2020-07-22 NOTE — TELEPHONE ENCOUNTER
----- Message from Doreen Stephenson sent at 7/22/2020  3:28 PM EDT -----  Regarding: Non-Urgent Medical Question  Contact: [phone]  My insurance is requiring a peer to peer for the mri.

## 2020-07-23 ENCOUNTER — APPOINTMENT (OUTPATIENT)
Dept: MRI IMAGING | Facility: HOSPITAL | Age: 30
End: 2020-07-23

## 2020-07-23 NOTE — TELEPHONE ENCOUNTER
No need to do the ankle to.  Please let him know that x-rays show some bony malalignment that could be consistent with ligament damage that may require surgical treatment and we need the MRI to assess that and also to evaluate for tear of the peroneal tendon that could require surgical treatment that if left untreated could potentially get worse over time.

## 2020-07-27 ENCOUNTER — TELEPHONE (OUTPATIENT)
Dept: ORTHOPEDIC SURGERY | Facility: CLINIC | Age: 30
End: 2020-07-27

## 2020-07-29 ENCOUNTER — OFFICE VISIT (OUTPATIENT)
Dept: ORTHOPEDIC SURGERY | Facility: CLINIC | Age: 30
End: 2020-07-29

## 2020-07-29 VITALS — WEIGHT: 200 LBS | TEMPERATURE: 98.4 F | BODY MASS INDEX: 29.62 KG/M2 | HEIGHT: 69 IN

## 2020-07-29 DIAGNOSIS — S93.401A SPRAIN OF RIGHT ANKLE, UNSPECIFIED LIGAMENT, INITIAL ENCOUNTER: Primary | ICD-10-CM

## 2020-07-29 DIAGNOSIS — S90.811A ABRASION, FOOT, RIGHT, INITIAL ENCOUNTER: ICD-10-CM

## 2020-07-29 DIAGNOSIS — M77.41 METATARSALGIA OF RIGHT FOOT: ICD-10-CM

## 2020-07-29 PROCEDURE — 99213 OFFICE O/P EST LOW 20 MIN: CPT | Performed by: ORTHOPAEDIC SURGERY

## 2020-07-29 RX ORDER — VENLAFAXINE 50 MG/1
50 TABLET ORAL 2 TIMES DAILY
COMMUNITY
End: 2021-10-20 | Stop reason: SDUPTHER
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
## 2020-07-31 ENCOUNTER — APPOINTMENT (OUTPATIENT)
Dept: MRI IMAGING | Facility: HOSPITAL | Age: 30
End: 2020-07-31

## 2020-10-14 ENCOUNTER — HOSPITAL ENCOUNTER (EMERGENCY)
Facility: HOSPITAL | Age: 30
Discharge: HOME | End: 2020-10-14
Attending: EMERGENCY MEDICINE | Admitting: EMERGENCY MEDICINE

## 2020-10-14 ENCOUNTER — APPOINTMENT (OUTPATIENT)
Dept: CT IMAGING | Facility: HOSPITAL | Age: 30
End: 2020-10-14

## 2020-10-14 VITALS
OXYGEN SATURATION: 100 % | RESPIRATION RATE: 18 BRPM | SYSTOLIC BLOOD PRESSURE: 117 MMHG | TEMPERATURE: 98.7 F | BODY MASS INDEX: 28.14 KG/M2 | WEIGHT: 190 LBS | HEIGHT: 69 IN | HEART RATE: 90 BPM | DIASTOLIC BLOOD PRESSURE: 90 MMHG

## 2020-10-14 DIAGNOSIS — K50.90 CROHN'S DISEASE WITHOUT COMPLICATION, UNSPECIFIED GASTROINTESTINAL TRACT LOCATION (HCC): Primary | ICD-10-CM

## 2020-10-14 LAB
ALBUMIN SERPL-MCNC: 4.3 G/DL (ref 3.5–5.2)
ALBUMIN/GLOB SERPL: 1.3 G/DL
ALP SERPL-CCNC: 110 U/L (ref 39–117)
ALT SERPL W P-5'-P-CCNC: 15 U/L (ref 1–33)
ANION GAP SERPL CALCULATED.3IONS-SCNC: 11.3 MMOL/L (ref 5–15)
AST SERPL-CCNC: 19 U/L (ref 1–32)
B-HCG UR QL: NEGATIVE
BASOPHILS # BLD AUTO: 0.04 10*3/MM3 (ref 0–0.2)
BASOPHILS NFR BLD AUTO: 0.8 % (ref 0–1.5)
BILIRUB SERPL-MCNC: 0.3 MG/DL (ref 0–1.2)
BILIRUB UR QL STRIP: NEGATIVE
BUN SERPL-MCNC: 8 MG/DL (ref 6–20)
BUN/CREAT SERPL: 11.4 (ref 7–25)
CALCIUM SPEC-SCNC: 8.9 MG/DL (ref 8.6–10.5)
CHLORIDE SERPL-SCNC: 104 MMOL/L (ref 98–107)
CLARITY UR: CLEAR
CO2 SERPL-SCNC: 24.7 MMOL/L (ref 22–29)
COLOR UR: YELLOW
CREAT SERPL-MCNC: 0.7 MG/DL (ref 0.57–1)
D-LACTATE SERPL-SCNC: 1.8 MMOL/L (ref 0.5–2)
DEPRECATED RDW RBC AUTO: 43.2 FL (ref 37–54)
EOSINOPHIL # BLD AUTO: 0.05 10*3/MM3 (ref 0–0.4)
EOSINOPHIL NFR BLD AUTO: 1 % (ref 0.3–6.2)
ERYTHROCYTE [DISTWIDTH] IN BLOOD BY AUTOMATED COUNT: 14.5 % (ref 12.3–15.4)
GFR SERPL CREATININE-BSD FRML MDRD: 98 ML/MIN/1.73
GLOBULIN UR ELPH-MCNC: 3.2 GM/DL
GLUCOSE SERPL-MCNC: 100 MG/DL (ref 65–99)
GLUCOSE UR STRIP-MCNC: NEGATIVE MG/DL
HCT VFR BLD AUTO: 40.4 % (ref 34–46.6)
HGB BLD-MCNC: 12.4 G/DL (ref 12–15.9)
HGB UR QL STRIP.AUTO: NEGATIVE
IMM GRANULOCYTES # BLD AUTO: 0.01 10*3/MM3 (ref 0–0.05)
IMM GRANULOCYTES NFR BLD AUTO: 0.2 % (ref 0–0.5)
KETONES UR QL STRIP: NEGATIVE
LEUKOCYTE ESTERASE UR QL STRIP.AUTO: NEGATIVE
LYMPHOCYTES # BLD AUTO: 1.29 10*3/MM3 (ref 0.7–3.1)
LYMPHOCYTES NFR BLD AUTO: 26 % (ref 19.6–45.3)
MAGNESIUM SERPL-MCNC: 2 MG/DL (ref 1.6–2.6)
MCH RBC QN AUTO: 25.2 PG (ref 26.6–33)
MCHC RBC AUTO-ENTMCNC: 30.7 G/DL (ref 31.5–35.7)
MCV RBC AUTO: 81.9 FL (ref 79–97)
MONOCYTES # BLD AUTO: 0.3 10*3/MM3 (ref 0.1–0.9)
MONOCYTES NFR BLD AUTO: 6 % (ref 5–12)
NEUTROPHILS NFR BLD AUTO: 3.27 10*3/MM3 (ref 1.7–7)
NEUTROPHILS NFR BLD AUTO: 66 % (ref 42.7–76)
NITRITE UR QL STRIP: NEGATIVE
NRBC BLD AUTO-RTO: 0 /100 WBC (ref 0–0.2)
PH UR STRIP.AUTO: 6.5 [PH] (ref 5–8)
PLATELET # BLD AUTO: 311 10*3/MM3 (ref 140–450)
PMV BLD AUTO: 10.1 FL (ref 6–12)
POTASSIUM SERPL-SCNC: 4 MMOL/L (ref 3.5–5.2)
PROT SERPL-MCNC: 7.5 G/DL (ref 6–8.5)
PROT UR QL STRIP: NEGATIVE
RBC # BLD AUTO: 4.93 10*6/MM3 (ref 3.77–5.28)
SODIUM SERPL-SCNC: 140 MMOL/L (ref 136–145)
SP GR UR STRIP: <=1.005 (ref 1–1.03)
UROBILINOGEN UR QL STRIP: NORMAL
WBC # BLD AUTO: 4.96 10*3/MM3 (ref 3.4–10.8)

## 2020-10-14 PROCEDURE — 80053 COMPREHEN METABOLIC PANEL: CPT | Performed by: EMERGENCY MEDICINE

## 2020-10-14 PROCEDURE — 81003 URINALYSIS AUTO W/O SCOPE: CPT | Performed by: EMERGENCY MEDICINE

## 2020-10-14 PROCEDURE — 83605 ASSAY OF LACTIC ACID: CPT | Performed by: EMERGENCY MEDICINE

## 2020-10-14 PROCEDURE — 85025 COMPLETE CBC W/AUTO DIFF WBC: CPT | Performed by: EMERGENCY MEDICINE

## 2020-10-14 PROCEDURE — 99283 EMERGENCY DEPT VISIT LOW MDM: CPT

## 2020-10-14 PROCEDURE — 74177 CT ABD & PELVIS W/CONTRAST: CPT

## 2020-10-14 PROCEDURE — 25010000002 IOPAMIDOL 61 % SOLUTION: Performed by: EMERGENCY MEDICINE

## 2020-10-14 PROCEDURE — 81025 URINE PREGNANCY TEST: CPT | Performed by: EMERGENCY MEDICINE

## 2020-10-14 PROCEDURE — 83735 ASSAY OF MAGNESIUM: CPT | Performed by: EMERGENCY MEDICINE

## 2020-10-14 RX ADMIN — IOPAMIDOL 85 ML: 612 INJECTION, SOLUTION INTRAVENOUS at 08:49

## 2020-10-14 NOTE — ED TRIAGE NOTES
Pt ambulated to triage desk with mask in place, pt states that she has a hx of crohn's disease and feels that she has a perianal abscess.  This RN wore a mask and goggles during the triage process.

## 2020-10-14 NOTE — DISCHARGE INSTRUCTIONS
Warm compresses to 3 times a day over the area of concern, Tylenol and ibuprofen for pain control, continue current medications, drink plenty fluids, follow-up with your primary care provider as needed, follow-up with your GI doctor for recheck as needed.  ED return for worsening symptoms as needed.

## 2020-10-14 NOTE — ED PROVIDER NOTES
EMERGENCY DEPARTMENT ENCOUNTER    Room Number:  15/15  Date of encounter:  10/14/2020  PCP: Chano Ortiz MD  Historian: Patient      HPI:  Chief Complaint: Perirectal abscess  A complete HPI/ROS/PMH/PSH/SH/FH are unobtainable due to: None    Context: Doreen Stephenson is a 30 y.o. female who presents to the ED via private vehicle from home for approximately 1 week of developing perirectal abscess.  Patient states that she had been pushing on it a couple days ago he noticed drainage coming out through her rectum.  Has not had any external drainage from the skin.  Area is tender to touch, worse with movement and worse with sitting in certain positions.  Has not had any fever, chills, new abdominal pains, nausea, vomiting.  Has her standard, baseline loose stools from underlying Crohn's disease.  States that she is on Stelara injections every 8 weeks, with last injection approximately 2 weeks ago.      MEDICAL RECORD REVIEW    ED visit July 25, 2019 for a Crohn's flare.  At that time increased dosing of prednisone and added Flagyl.    PAST MEDICAL HISTORY  Active Ambulatory Problems     Diagnosis Date Noted   • Ventral hernia without obstruction or gangrene 08/10/2018   • Metatarsalgia of right foot 07/13/2020   • Abrasion, foot, right, initial encounter 07/13/2020   • Injury of peroneal tendon of right foot 07/13/2020   • Sprain of right ankle 07/13/2020     Resolved Ambulatory Problems     Diagnosis Date Noted   • No Resolved Ambulatory Problems     Past Medical History:   Diagnosis Date   • Anemia    • Anxiety    • Crohn's disease (CMS/HCC)    • Depression    • Incisional hernia    • PONV (postoperative nausea and vomiting)    • Ventral hernia          PAST SURGICAL HISTORY  Past Surgical History:   Procedure Laterality Date   • COLON RESECTION SMALL BOWEL N/A 08/31/2017    Dr. Rockwell   • COLON RESECTION WITH COLOSTOMY N/A 09/04/2017    Due to anastomosis infection-Dr. Rockwell   • COLONOSCOPY N/A 2016      Niels   • ILEOSTOMY CLOSURE N/A 12/12/2017    Dr. Rockwell   • VENTRAL/INCISIONAL HERNIA REPAIR N/A 9/10/2018    Procedure: VENTRAL/INCISIONAL HERNIA REPAIR WITH MESH AND COMPONENT SEPARATION;  Surgeon: Shawn Villasenor Jr., MD;  Location: [facility];  Service: General   • VENTRAL/INCISIONAL HERNIA REPAIR N/A 12/17/2018    Procedure: VENTRAL/INCISIONAL HERNIA REPAIR;  Surgeon: Shawn Villasenor Jr., MD;  Location: [facility];  Service: General   • WISDOM TOOTH EXTRACTION           FAMILY HISTORY  Family History   Problem Relation Age of Onset   • Malig Hyperthermia Neg Hx          SOCIAL HISTORY  Social History     Socioeconomic History   • Marital status: Single     Spouse name: Not on file   • Number of children: Not on file   • Years of education: Not on file   • Highest education level: Not on file   Tobacco Use   • Smoking status: Never Smoker   • Smokeless tobacco: Never Used   Substance and Sexual Activity   • Alcohol use: No   • Drug use: No   • Sexual activity: Defer         ALLERGIES  Morphine        REVIEW OF SYSTEMS  Review of Systems     All systems reviewed and negative except for those discussed in HPI.       PHYSICAL EXAM    I have reviewed the triage vital signs and nursing notes.    ED Triage Vitals [10/14/20 0708]   Temp Heart Rate Resp BP SpO2   98.7 °F (37.1 °C) (!) 143 -- -- 100 %      Temp src Heart Rate Source Patient Position BP Location FiO2 (%)   Tympanic Monitor -- -- --       Physical Exam  General: Awake, alert, no acute distress, nontoxic, nondiaphoretic  HEENT: Mucous membranes moist, atraumatic, normocephalic, EOMI  Neck: Full ROM  Pulm: Symmetric, nonlabored, lungs CTAB  Cardiovascular: Regular rate and rhythm, normal S1/S2, intact distal pulses  GI: Soft, nontender, nondistended, no rebound, no guarding, bowel sounds present  Rectal: 1cm x 1cm tender area of fluctuance in the right gluteal cleft region without purulent head or erythema. No obvious involvement with the anal  sphincter externally  MSK: Full ROM, no deformity  Skin: Warm, dry  Neuro: Alert and oriented x 3, GCS 15, moving all extremities, no focal deficits  Psych: Calm, cooperative      Surgical mask, protective eye goggles, and gloves used during this encounter. Patient in surgical mask.      LAB RESULTS  Recent Results (from the past 24 hour(s))   Comprehensive Metabolic Panel    Collection Time: 10/14/20  7:34 AM    Specimen: Blood   Result Value Ref Range    Glucose 100 (H) 65 - 99 mg/dL    BUN 8 6 - 20 mg/dL    Creatinine 0.70 0.57 - 1.00 mg/dL    Sodium 140 136 - 145 mmol/L    Potassium 4.0 3.5 - 5.2 mmol/L    Chloride 104 98 - 107 mmol/L    CO2 24.7 22.0 - 29.0 mmol/L    Calcium 8.9 8.6 - 10.5 mg/dL    Total Protein 7.5 6.0 - 8.5 g/dL    Albumin 4.30 3.50 - 5.20 g/dL    ALT (SGPT) 15 1 - 33 U/L    AST (SGOT) 19 1 - 32 U/L    Alkaline Phosphatase 110 39 - 117 U/L    Total Bilirubin 0.3 0.0 - 1.2 mg/dL    eGFR Non African Amer 98 >60 mL/min/1.73    Globulin 3.2 gm/dL    A/G Ratio 1.3 g/dL    BUN/Creatinine Ratio 11.4 7.0 - 25.0    Anion Gap 11.3 5.0 - 15.0 mmol/L   Urinalysis With Microscopic If Indicated (No Culture) - Urine, Clean Catch    Collection Time: 10/14/20  7:34 AM    Specimen: Urine, Clean Catch   Result Value Ref Range    Color, UA Yellow Yellow, Straw    Appearance, UA Clear Clear    pH, UA 6.5 5.0 - 8.0    Specific Gravity, UA <=1.005 1.005 - 1.030    Glucose, UA Negative Negative    Ketones, UA Negative Negative    Bilirubin, UA Negative Negative    Blood, UA Negative Negative    Protein, UA Negative Negative    Leuk Esterase, UA Negative Negative    Nitrite, UA Negative Negative    Urobilinogen, UA 0.2 E.U./dL 0.2 - 1.0 E.U./dL   Pregnancy, Urine - Urine, Clean Catch    Collection Time: 10/14/20  7:34 AM    Specimen: Urine, Clean Catch   Result Value Ref Range    HCG, Urine QL Negative Negative   Magnesium    Collection Time: 10/14/20  7:34 AM    Specimen: Blood   Result Value Ref Range    Magnesium  2.0 1.6 - 2.6 mg/dL   Lactic Acid, Plasma    Collection Time: 10/14/20  7:34 AM    Specimen: Blood   Result Value Ref Range    Lactate 1.8 0.5 - 2.0 mmol/L   CBC Auto Differential    Collection Time: 10/14/20  7:34 AM    Specimen: Blood   Result Value Ref Range    WBC 4.96 3.40 - 10.80 10*3/mm3    RBC 4.93 3.77 - 5.28 10*6/mm3    Hemoglobin 12.4 12.0 - 15.9 g/dL    Hematocrit 40.4 34.0 - 46.6 %    MCV 81.9 79.0 - 97.0 fL    MCH 25.2 (L) 26.6 - 33.0 pg    MCHC 30.7 (L) 31.5 - 35.7 g/dL    RDW 14.5 12.3 - 15.4 %    RDW-SD 43.2 37.0 - 54.0 fl    MPV 10.1 6.0 - 12.0 fL    Platelets 311 140 - 450 10*3/mm3    Neutrophil % 66.0 42.7 - 76.0 %    Lymphocyte % 26.0 19.6 - 45.3 %    Monocyte % 6.0 5.0 - 12.0 %    Eosinophil % 1.0 0.3 - 6.2 %    Basophil % 0.8 0.0 - 1.5 %    Immature Grans % 0.2 0.0 - 0.5 %    Neutrophils, Absolute 3.27 1.70 - 7.00 10*3/mm3    Lymphocytes, Absolute 1.29 0.70 - 3.10 10*3/mm3    Monocytes, Absolute 0.30 0.10 - 0.90 10*3/mm3    Eosinophils, Absolute 0.05 0.00 - 0.40 10*3/mm3    Basophils, Absolute 0.04 0.00 - 0.20 10*3/mm3    Immature Grans, Absolute 0.01 0.00 - 0.05 10*3/mm3    nRBC 0.0 0.0 - 0.2 /100 WBC       Ordered the above labs and independently reviewed the results.        RADIOLOGY  Ct Abdomen Pelvis With Contrast    Result Date: 10/14/2020  CT ABDOMEN AND PELVIS WITH IV CONTRAST  HISTORY: 30-year-old female with a right gluteal crease abscess. Evaluate for perianal or perirectal extension. Crohn's disease. Bowel surgery.  TECHNIQUE: Radiation dose reduction techniques were utilized, including automated exposure control and exposure modulation based on body size. 3 mm images were obtained through the abdomen and pelvis after the administration of IV contrast. Compared with previous CT from 07/25/2019.  FINDINGS: There is a focal thickening of the subcutaneous tissues at the right gluteal crease measuring approximately 2 cm. There is slight asymmetric thickening of the subcutaneous tissues  anteriorly and superiorly, but there is no additional fluid collection. There is no perianal or perirectal fluid. There is no rectal thickening. No acute bowel abnormality is seen. There has been a right hemicolectomy or a partial right hemicolectomy and the ileocolonic anastomosis appears unremarkable. Uterus and adnexa appear unremarkable. There is a small amount of free fluid at the cul-de-sac which is within physiological range. There is no acute abnormality involving the liver, gallbladder, spleen, pancreas, adrenals, or kidneys.      Approximately 2 cm focus of subcutaneous thickening along the right gluteal crease likely represents a tiny abscess or partially drained abscess. A tiny right perianal sinus tract cannot be entirely excluded, but there is no perianal fluid collection.  Discussed with Dr. Vo.  This report was finalized on 10/14/2020 1:52 PM by Dr. Ophelia Cunningham M.D.        I ordered the above noted radiological studies. Reviewed by me, discussed with Radiologist.  See dictation for official radiology interpretation.      PROCEDURES    Procedures      MEDICATIONS GIVEN IN ER    Medications   iopamidol (ISOVUE-300) 61 % injection 100 mL (85 mL Intravenous Given by Other 10/14/20 0849)         PROGRESS, DATA ANALYSIS, CONSULTS, AND MEDICAL DECISION MAKING    All labs have been independently reviewed by me.  All radiology studies have been reviewed by me and discussed with radiologist dictating the report.   EKG's independently viewed and interpreted by me.  Discussion below represents my analysis of pertinent findings related to patient's condition, differential diagnosis, treatment plan and final disposition.    Based on patient's reports of drainage through her rectum earlier in the week, do have concerns that this apparently small abscess externally may be larger and tracking deeper into the pelvis.  I do think labs and CT scan of the abdomen pelvis to be warranted.    ED Course as of Oct 14 1400    Wed Oct 14, 2020   0913 Discussed case with Dr. Cunningham, radiology, no evidence of any drainable abscess or fistula tract at this point on CT scan.    [DC]   0926 After reviewing patient's labs and imaging findings without evidence of abscess or cellulitis, with reassuring labs with no leukocytosis, normal lactic acid, I do not feel the patient warrants antibiotics at this time.  I suspect that she has a self resolving abscess which she herself was able to get to drain couple days ago.  I think she likely has some mild residual inflammation which is causing some tenderness to the area, I recommended warm compresses, anti-inflammatories, and GI follow-up.  ED return for worsening symptoms as needed.    [DC]      ED Course User Index  [DC] Gilmar Vo MD       AS OF 14:00 EDT VITALS:    BP - 117/90  HR - 90  TEMP - 98.7 °F (37.1 °C) (Tympanic)  02 SATS - 100%        DIAGNOSIS  Final diagnoses:   Crohn's disease without complication, unspecified gastrointestinal tract location (CMS/[facility])         DISPOSITION  DISCHARGE    Patient discharged in stable condition.    Reviewed implications of results, diagnosis, meds, responsibility to follow up, warning signs and symptoms of possible worsening, potential complications and reasons to return to ER.    Patient/Family voiced understanding of above instructions.    Discussed plan for discharge, as there is no emergent indication for admission. Patient referred to primary care provider for BP management due to today's BP. Pt/family is agreeable and understands need for follow up and repeat testing.  Pt is aware that discharge does not mean that nothing is wrong but it indicates no emergency is present that requires admission and they must continue care with follow-up as given below or physician of their choice.     FOLLOW-UP  [facility] Emergency Department  [address] [facility] [address] [phone]    As needed, If symptoms  worsen    Chano Ortiz MD  [address]  [place] [address]  [phone]    Schedule an appointment as soon as possible for a visit   As needed    [facility] GASTRO[facility]OLOG[facility]  [address]  [facility] [address] [phone]  Schedule an appointment as soon as possible for a visit            Medication List      No changes were made to your prescriptions during this visit.                    Gilmar Vo MD  10/14/20 1400

## 2020-10-15 ENCOUNTER — EPISODE CHANGES (OUTPATIENT)
Dept: CASE MANAGEMENT | Facility: OTHER | Age: 30
End: 2020-10-15

## 2020-10-18 ENCOUNTER — HOSPITAL ENCOUNTER (EMERGENCY)
Facility: HOSPITAL | Age: 30
Discharge: HOME | End: 2020-10-18
Attending: EMERGENCY MEDICINE | Admitting: EMERGENCY MEDICINE

## 2020-10-18 ENCOUNTER — APPOINTMENT (OUTPATIENT)
Dept: CT IMAGING | Facility: HOSPITAL | Age: 30
End: 2020-10-18

## 2020-10-18 VITALS
HEIGHT: 69 IN | HEART RATE: 69 BPM | WEIGHT: 190 LBS | RESPIRATION RATE: 16 BRPM | SYSTOLIC BLOOD PRESSURE: 122 MMHG | TEMPERATURE: 99.3 F | OXYGEN SATURATION: 98 % | DIASTOLIC BLOOD PRESSURE: 79 MMHG | BODY MASS INDEX: 28.14 KG/M2

## 2020-10-18 DIAGNOSIS — K50.919 CROHN'S DISEASE WITH COMPLICATION, UNSPECIFIED GASTROINTESTINAL TRACT LOCATION (HCC): ICD-10-CM

## 2020-10-18 DIAGNOSIS — S31.819A GLUTEAL CLEFT WOUND, RIGHT, INITIAL ENCOUNTER: Primary | ICD-10-CM

## 2020-10-18 LAB
ALBUMIN SERPL-MCNC: 4 G/DL (ref 3.5–5.2)
ALBUMIN/GLOB SERPL: 1.5 G/DL
ALP SERPL-CCNC: 103 U/L (ref 39–117)
ALT SERPL W P-5'-P-CCNC: 12 U/L (ref 1–33)
ANION GAP SERPL CALCULATED.3IONS-SCNC: 9.8 MMOL/L (ref 5–15)
AST SERPL-CCNC: 15 U/L (ref 1–32)
B-HCG UR QL: NEGATIVE
BASOPHILS # BLD AUTO: 0.05 10*3/MM3 (ref 0–0.2)
BASOPHILS NFR BLD AUTO: 0.8 % (ref 0–1.5)
BILIRUB SERPL-MCNC: <0.2 MG/DL (ref 0–1.2)
BILIRUB UR QL STRIP: NEGATIVE
BUN SERPL-MCNC: 9 MG/DL (ref 6–20)
BUN/CREAT SERPL: 12.3 (ref 7–25)
CALCIUM SPEC-SCNC: 9.1 MG/DL (ref 8.6–10.5)
CHLORIDE SERPL-SCNC: 106 MMOL/L (ref 98–107)
CLARITY UR: CLEAR
CO2 SERPL-SCNC: 25.2 MMOL/L (ref 22–29)
COLOR UR: YELLOW
CREAT SERPL-MCNC: 0.73 MG/DL (ref 0.57–1)
DEPRECATED RDW RBC AUTO: 44.8 FL (ref 37–54)
EOSINOPHIL # BLD AUTO: 0.05 10*3/MM3 (ref 0–0.4)
EOSINOPHIL NFR BLD AUTO: 0.8 % (ref 0.3–6.2)
ERYTHROCYTE [DISTWIDTH] IN BLOOD BY AUTOMATED COUNT: 14.7 % (ref 12.3–15.4)
GFR SERPL CREATININE-BSD FRML MDRD: 93 ML/MIN/1.73
GLOBULIN UR ELPH-MCNC: 2.6 GM/DL
GLUCOSE SERPL-MCNC: 112 MG/DL (ref 65–99)
GLUCOSE UR STRIP-MCNC: NEGATIVE MG/DL
HCT VFR BLD AUTO: 35.3 % (ref 34–46.6)
HGB BLD-MCNC: 11.1 G/DL (ref 12–15.9)
HGB UR QL STRIP.AUTO: NEGATIVE
IMM GRANULOCYTES # BLD AUTO: 0.01 10*3/MM3 (ref 0–0.05)
IMM GRANULOCYTES NFR BLD AUTO: 0.2 % (ref 0–0.5)
KETONES UR QL STRIP: ABNORMAL
LEUKOCYTE ESTERASE UR QL STRIP.AUTO: NEGATIVE
LYMPHOCYTES # BLD AUTO: 1.19 10*3/MM3 (ref 0.7–3.1)
LYMPHOCYTES NFR BLD AUTO: 18.3 % (ref 19.6–45.3)
MCH RBC QN AUTO: 26.3 PG (ref 26.6–33)
MCHC RBC AUTO-ENTMCNC: 31.4 G/DL (ref 31.5–35.7)
MCV RBC AUTO: 83.6 FL (ref 79–97)
MONOCYTES # BLD AUTO: 0.42 10*3/MM3 (ref 0.1–0.9)
MONOCYTES NFR BLD AUTO: 6.5 % (ref 5–12)
NEUTROPHILS NFR BLD AUTO: 4.78 10*3/MM3 (ref 1.7–7)
NEUTROPHILS NFR BLD AUTO: 73.4 % (ref 42.7–76)
NITRITE UR QL STRIP: NEGATIVE
NRBC BLD AUTO-RTO: 0 /100 WBC (ref 0–0.2)
PH UR STRIP.AUTO: 5.5 [PH] (ref 5–8)
PLATELET # BLD AUTO: 258 10*3/MM3 (ref 140–450)
PMV BLD AUTO: 10.2 FL (ref 6–12)
POTASSIUM SERPL-SCNC: 3.9 MMOL/L (ref 3.5–5.2)
PROT SERPL-MCNC: 6.6 G/DL (ref 6–8.5)
PROT UR QL STRIP: NEGATIVE
RBC # BLD AUTO: 4.22 10*6/MM3 (ref 3.77–5.28)
SODIUM SERPL-SCNC: 141 MMOL/L (ref 136–145)
SP GR UR STRIP: 1.03 (ref 1–1.03)
UROBILINOGEN UR QL STRIP: ABNORMAL
WBC # BLD AUTO: 6.5 10*3/MM3 (ref 3.4–10.8)

## 2020-10-18 PROCEDURE — 25010000002 IOPAMIDOL 61 % SOLUTION: Performed by: EMERGENCY MEDICINE

## 2020-10-18 PROCEDURE — 80053 COMPREHEN METABOLIC PANEL: CPT | Performed by: PHYSICIAN ASSISTANT

## 2020-10-18 PROCEDURE — 81025 URINE PREGNANCY TEST: CPT | Performed by: PHYSICIAN ASSISTANT

## 2020-10-18 PROCEDURE — 74177 CT ABD & PELVIS W/CONTRAST: CPT

## 2020-10-18 PROCEDURE — 81003 URINALYSIS AUTO W/O SCOPE: CPT | Performed by: PHYSICIAN ASSISTANT

## 2020-10-18 PROCEDURE — 85025 COMPLETE CBC W/AUTO DIFF WBC: CPT | Performed by: PHYSICIAN ASSISTANT

## 2020-10-18 PROCEDURE — 99283 EMERGENCY DEPT VISIT LOW MDM: CPT

## 2020-10-18 RX ORDER — SULFAMETHOXAZOLE AND TRIMETHOPRIM 800; 160 MG/1; MG/1
1 TABLET ORAL 2 TIMES DAILY
Qty: 10 TABLET | Refills: 0 | Status: SHIPPED | OUTPATIENT
Start: 2020-10-18 | End: 2020-10-23

## 2020-10-18 RX ORDER — LIDOCAINE HYDROCHLORIDE AND EPINEPHRINE 10; 10 MG/ML; UG/ML
10 INJECTION, SOLUTION INFILTRATION; PERINEURAL ONCE
Status: COMPLETED | OUTPATIENT
Start: 2020-10-18 | End: 2020-10-18

## 2020-10-18 RX ADMIN — LIDOCAINE HYDROCHLORIDE,EPINEPHRINE BITARTRATE 10 ML: 10; .01 INJECTION, SOLUTION INFILTRATION; PERINEURAL at 22:15

## 2020-10-18 RX ADMIN — SODIUM CHLORIDE 1000 ML: 9 INJECTION, SOLUTION INTRAVENOUS at 18:21

## 2020-10-18 RX ADMIN — IOPAMIDOL 100 ML: 612 INJECTION, SOLUTION INTRAVENOUS at 20:26

## 2020-10-18 NOTE — ED TRIAGE NOTES
"Pt to ER via PV. Pt states she was seen on Wednesday morning for a perianal abscess but only a CT was done and showed nothing to be drained at that time. Pt states the abscess has became bigger and she is having worsening pain. Pt has subjective fever    Pt states her and her boyfriend have been having \"respiratory symptoms\" in which they have both have been COVID tested with no results at this time.     Patient in mask. This RN in appropriate PPE - including mask, goggles, and gloves during all of patient care.     "

## 2020-10-18 NOTE — ED PROVIDER NOTES
MD ATTESTATION NOTE    The ZOLTAN and I have discussed this patient's history, physical exam, and treatment plan. I have reviewed the documentation and personally had a face to face interaction with the patient. I affirm the ZOLTAN documentation and agree with their diagnostics, findings, treatment, plan, and disposition.  The attached note describes my personal findings.    Doreen Stephenson is a 30 y.o. female who presents to the ED c/o right gluteal abscess.  Patient states abscess is been there for 2 weeks, was seen approximately 4 days ago, was having drainage at that time.  Patient had CT imaging done, no I&D performed, no antibiotics.  Patient reports is continued to have purulent drainage, has not increased in size.  Patient began having diarrhea on Friday, reports 10 episodes daily, no blood, positive for mucus.  Patient has had nausea, no emesis.  Patient complains of cramping periumbilical pain.  No fever or systemic symptoms.    On exam:  General: NAD  Head: NCAT  ENT: Extraocular motion intact, pupils equal and round reactive to light, moist mucous membranes  Neck: Supple, trachea midline  Cardiac: regular rate and rhythm  Lungs: Clear to auscultation bilaterally  Abdomen: Soft, nontender, no rebound tenderness/guarding/rigidity  Extremities: Moves all extremities well, no peripheral edema   exam deferred to ZOLTAN.  Skin: Warm, dry    Medical Decision Making:  Patient was placed in face mask in first look. Patient was wearing facemask when I entered the room and throughout our encounter. I wore full protective equipment throughout this patient encounter including a face mask, and gloves. Hand hygiene was performed before donning protective equipment and after removal when leaving the room.  After the initial H&P, I discussed pertinent information from history and physical exam with patient/family.  Discussed differential diagnosis.  Discussed plan for ED evaluation/work-up/treatment.  All questions answered.   Patient/family is agreeable with plan.  ED Course as of Oct 20 2331   Sun Oct 18, 2020   1752 Due to patient's antibiotic use and multiple episodes of watery diarrhea a day, C. difficile panel ordered.    [SS]   1849 Appears to be slightly lower than hemoglobin done 4 days ago.  At that time it was 12.   Hemoglobin(!): 11.1 [SS]   2000 CT Abd did not show abscess with a fistula. Drained abscess with minimal amount of drainage. Advised for pt to stop flagyl and to start taking bactrim to cover possible MRSA. Advised to f/u with pcp or her GI doctor.    [SS]      ED Course User Index  [SS] Mima Titus PA-C       Diagnosis  Final diagnoses:   Gluteal cleft wound, right, initial encounter   Crohn's disease with complication, unspecified gastrointestinal tract location (CMS/HCC)        Arthur Christianson MD  10/18/20 2312       Arthur Christianson MD  10/20/20 2331

## 2020-10-18 NOTE — ED PROVIDER NOTES
EMERGENCY DEPARTMENT ENCOUNTER    Room Number:  06/06  Date seen:  10/19/2020  Time seen: 17:17 EDT  PCP: Chano Ortiz MD  Historian: Patient    HPI:  Chief complaint:  abscess  A complete HPI/ROS/PMH/PSH/SH/FH are unobtainable due to: None  Context:Doreen Stephenson is a 30 y.o. female, hx of Crohn's disease, who presents to the ED with c/o receiving a abscess on her right gluteal crease for 2 weeks.  She noticed drainage coming from the abscess 1 week ago and came to the ER 4 days ago due to persistent drainage.  She reports that it was not drained here in the emergency room, she notified her GI doctor Dr. Peña who prescribed her Flagyl 2 days ago.  She reports that she has not picked up the prescription for Flagyl but has been using her left eye for Flagyl from this summer which she says is the same dosing.  In addition, patient is complaining of periumbilical abdominal pain and multiple episodes of diarrhea for 2 days.  She says that she had about 10 episodes of diarrhea today.    Patient was placed in face mask in first look. Patient was wearing facemask when I entered the room and throughout our encounter. I wore full protective equipment throughout this patient encounter including a face mask, goggles, and gloves. Hand hygiene was performed before donning protective equipment and after removal when leaving the room.      MEDICAL RECORD REVIEW  Patient was here 10/14/2020 for perirectal abscess.    ALLERGIES  Morphine    PAST MEDICAL HISTORY  Active Ambulatory Problems     Diagnosis Date Noted   • Ventral hernia without obstruction or gangrene 08/10/2018   • Metatarsalgia of right foot 07/13/2020   • Abrasion, foot, right, initial encounter 07/13/2020   • Injury of peroneal tendon of right foot 07/13/2020   • Sprain of right ankle 07/13/2020     Resolved Ambulatory Problems     Diagnosis Date Noted   • No Resolved Ambulatory Problems     Past Medical History:   Diagnosis Date   • Anemia    • Anxiety     • Crohn's disease (CMS/HCC)    • Depression    • Incisional hernia    • PONV (postoperative nausea and vomiting)    • Ventral hernia        PAST SURGICAL HISTORY  Past Surgical History:   Procedure Laterality Date   • COLON RESECTION SMALL BOWEL N/A 08/31/2017    Dr. Rocwkell   • COLON RESECTION WITH COLOSTOMY N/A 09/04/2017    Due to anastomosis infection-Dr. Rockwell   • COLONOSCOPY N/A 2016    Dr. Bowser   • ILEOSTOMY CLOSURE N/A 12/12/2017    Dr. Rockwell   • VENTRAL/INCISIONAL HERNIA REPAIR N/A 9/10/2018    Procedure: VENTRAL/INCISIONAL HERNIA REPAIR WITH MESH AND COMPONENT SEPARATION;  Surgeon: Shawn Villasenor Jr., MD;  Location: [facility];  Service: General   • VENTRAL/INCISIONAL HERNIA REPAIR N/A 12/17/2018    Procedure: VENTRAL/INCISIONAL HERNIA REPAIR;  Surgeon: Shawn Villasenor Jr., MD;  Location: [facility];  Service: General   • WISDOM TOOTH EXTRACTION         FAMILY HISTORY  Family History   Problem Relation Age of Onset   • Malig Hyperthermia Neg Hx        SOCIAL HISTORY  Social History     Socioeconomic History   • Marital status: Single     Spouse name: Not on file   • Number of children: Not on file   • Years of education: Not on file   • Highest education level: Not on file   Tobacco Use   • Smoking status: Never Smoker   • Smokeless tobacco: Never Used   Substance and Sexual Activity   • Alcohol use: No   • Drug use: No   • Sexual activity: Defer       REVIEW OF SYSTEMS  Review of Systems    All systems reviewed and negative except for those discussed in HPI.     PHYSICAL EXAM    ED Triage Vitals [10/18/20 1621]   Temp Heart Rate Resp BP SpO2   99.3 °F (37.4 °C) 115 18 -- 98 %      Temp src Heart Rate Source Patient Position BP Location FiO2 (%)   -- -- -- -- --     Physical Exam    I have reviewed the triage vital signs and nursing notes.      GENERAL: not distressed  HENT: nares patent  EYES: no scleral icterus  NECK: no ROM limitations  CV: regular rhythm, regular rate  RESPIRATORY:  normal effort  ABDOMEN: soft; generalized abdominal tenderness with palpation, no rebound or guarding  : There is a 3 cm x 2 cm tender area of fluctuance in the right gluteal cleft region without purulence  MUSCULOSKELETAL: no deformity  NEURO: alert, moves all extremities, follows commands  SKIN: warm, dry    LAB RESULTS  No results found for this or any previous visit (from the past 24 hour(s)).      RADIOLOGY RESULTS  CT Abdomen Pelvis With Contrast   Final Result   Mild skin thickening involving the right gluteal fold, likely reflecting   small abscess or phlegmon. This is not significantly changed when   compared to the prior exam. Discrete fistulous tract cannot be   identified on this examination, but MRI would be more sensitive for   evaluation. No definite rim-enhancing perianal collection is seen on   these images.       Radiation dose reduction techniques were utilized, including automated   exposure control and exposure modulation based on body size.       This report was finalized on 10/18/2020 9:05 PM by Dr. Yadira Canales M.D.                PROGRESS, DATA ANALYSIS, CONSULTS AND MEDICAL DECISION MAKING  All labs have been independently reviewed by me.  All radiology studies have been reviewed by me and discussed with radiologist dictating the report. Discussion below represents my analysis of pertinent findings related to patient's condition, differential diagnosis, treatment plan and final disposition.     ED Course as of Oct 19 2045   Sun Oct 18, 2020   1752 Due to patient's antibiotic use and multiple episodes of watery diarrhea a day, C. difficile panel ordered.    [SS]   1849 Appears to be slightly lower than hemoglobin done 4 days ago.  At that time it was 12.   Hemoglobin(!): 11.1 [SS]   2000 CT Abd did not show abscess with a fistula. Drained abscess with minimal amount of drainage. Advised for pt to stop flagyl and to start taking bactrim to cover possible MRSA. Advised to f/u with pcp  "or her GI doctor.    [SS]      ED Course User Index  [SS] Mima Titus PA-C         Incision & Drainage    Date/Time: 10/19/2020 8:44 PM  Performed by: Mima Titus PA-C  Authorized by: Arthur Christianson MD     Consent:     Consent obtained:  Verbal    Consent given by:  Patient    Risks discussed:  Incomplete drainage and pain  Location:     Type:  Abscess    Size:  3x2cm    Location:  Anogenital    Anogenital location:  Gluteal cleft  Pre-procedure details:     Skin preparation:  Betadine  Anesthesia (see MAR for exact dosages):     Anesthesia method:  Local infiltration    Local anesthetic:  Lidocaine 1% WITH epi  Procedure type:     Complexity:  Simple  Procedure details:     Needle aspiration: no      Incision types:  Single straight    Incision depth:  Dermal    Scalpel blade:  11    Wound management:  Irrigated with saline and extensive cleaning    Drainage:  Bloody    Drainage amount:  Scant    Wound treatment:  Wound left open    Packing materials:  1/4 in gauze    Amount 1/4\":  0.25cm  Post-procedure details:     Patient tolerance of procedure:  Tolerated well, no immediate complications       Reviewed pt's history and workup with Dr. Christianson.  After a bedside evaluation, Dr. Christianson agrees with the plan of care.    (FOR DISCHARGE)The patient's history, physical exam, and lab findings were discussed with the physician, who also performed a face to face history and physical exam.  I discussed all results and noted any abnormalities with patient.  Discussed absoute need to recheck abnormalities with their family physician.  I answered any of the patient's questions.  Discussed plan for discharge, as there is no emergent indication for admission.  Pt is agreeable and understands need for follow up and repeat testing.  Pt is aware that discharge does not mean that nothing is wrong but it indicates no emergency is present and they must continue care with their family physician.  Pt is discharged with " "instructions to follow up with primary care doctor to have their blood pressure rechecked.         Disposition vitals:  /79   Pulse 69   Temp 99.3 °F (37.4 °C)   Resp 16   Ht 175.3 cm (69\")   Wt 86.2 kg (190 lb)   LMP 10/05/2020 (Approximate)   SpO2 98%   Breastfeeding No   BMI 28.06 kg/m²       DIAGNOSIS  Final diagnoses:   Gluteal cleft wound, right, initial encounter   Crohn's disease with complication, unspecified gastrointestinal tract location (CMS/HCC)       FOLLOW UP   Chano Ortiz MD  [address] [address]  [phone]    Call in 2 days  For wound packing removal    [facility] Emergency Department  [address] [facility] [address] [phone]             Mima Titus PA-C  10/19/20 2045    "

## 2020-10-19 ENCOUNTER — EPISODE CHANGES (OUTPATIENT)
Dept: CASE MANAGEMENT | Facility: OTHER | Age: 30
End: 2020-10-19

## 2020-10-19 NOTE — DISCHARGE INSTRUCTIONS
Take the antibiotics as prescribed.  Leave the wound packing in for 2 days and have your primary care provider or your GI doctor remove the packing.

## 2020-10-21 ENCOUNTER — HOSPITAL ENCOUNTER (EMERGENCY)
Facility: HOSPITAL | Age: 30
Discharge: HOME | End: 2020-10-21
Attending: EMERGENCY MEDICINE | Admitting: EMERGENCY MEDICINE

## 2020-10-21 VITALS
SYSTOLIC BLOOD PRESSURE: 128 MMHG | WEIGHT: 190 LBS | BODY MASS INDEX: 28.14 KG/M2 | TEMPERATURE: 98.5 F | RESPIRATION RATE: 16 BRPM | HEART RATE: 57 BPM | OXYGEN SATURATION: 98 % | HEIGHT: 69 IN | DIASTOLIC BLOOD PRESSURE: 89 MMHG

## 2020-10-21 DIAGNOSIS — Z87.19 HISTORY OF CROHN'S DISEASE: ICD-10-CM

## 2020-10-21 DIAGNOSIS — R19.7 DIARRHEA, UNSPECIFIED TYPE: Primary | ICD-10-CM

## 2020-10-21 LAB
ALBUMIN SERPL-MCNC: 4 G/DL (ref 3.5–5.2)
ALBUMIN/GLOB SERPL: 1.6 G/DL
ALP SERPL-CCNC: 98 U/L (ref 39–117)
ALT SERPL W P-5'-P-CCNC: 18 U/L (ref 1–33)
ANION GAP SERPL CALCULATED.3IONS-SCNC: 7.4 MMOL/L (ref 5–15)
AST SERPL-CCNC: 20 U/L (ref 1–32)
BASOPHILS # BLD AUTO: 0.03 10*3/MM3 (ref 0–0.2)
BASOPHILS NFR BLD AUTO: 0.5 % (ref 0–1.5)
BILIRUB SERPL-MCNC: 0.2 MG/DL (ref 0–1.2)
BUN SERPL-MCNC: 7 MG/DL (ref 6–20)
BUN/CREAT SERPL: 9.3 (ref 7–25)
CALCIUM SPEC-SCNC: 9 MG/DL (ref 8.6–10.5)
CHLORIDE SERPL-SCNC: 105 MMOL/L (ref 98–107)
CO2 SERPL-SCNC: 26.6 MMOL/L (ref 22–29)
CREAT SERPL-MCNC: 0.75 MG/DL (ref 0.57–1)
DEPRECATED RDW RBC AUTO: 44.5 FL (ref 37–54)
EOSINOPHIL # BLD AUTO: 0.05 10*3/MM3 (ref 0–0.4)
EOSINOPHIL NFR BLD AUTO: 0.9 % (ref 0.3–6.2)
ERYTHROCYTE [DISTWIDTH] IN BLOOD BY AUTOMATED COUNT: 14.7 % (ref 12.3–15.4)
GFR SERPL CREATININE-BSD FRML MDRD: 90 ML/MIN/1.73
GLOBULIN UR ELPH-MCNC: 2.5 GM/DL
GLUCOSE SERPL-MCNC: 88 MG/DL (ref 65–99)
HCT VFR BLD AUTO: 36.2 % (ref 34–46.6)
HGB BLD-MCNC: 11.3 G/DL (ref 12–15.9)
IMM GRANULOCYTES # BLD AUTO: 0.01 10*3/MM3 (ref 0–0.05)
IMM GRANULOCYTES NFR BLD AUTO: 0.2 % (ref 0–0.5)
LYMPHOCYTES # BLD AUTO: 1.21 10*3/MM3 (ref 0.7–3.1)
LYMPHOCYTES NFR BLD AUTO: 22.1 % (ref 19.6–45.3)
MAGNESIUM SERPL-MCNC: 1.7 MG/DL (ref 1.6–2.6)
MCH RBC QN AUTO: 25.7 PG (ref 26.6–33)
MCHC RBC AUTO-ENTMCNC: 31.2 G/DL (ref 31.5–35.7)
MCV RBC AUTO: 82.5 FL (ref 79–97)
MONOCYTES # BLD AUTO: 0.32 10*3/MM3 (ref 0.1–0.9)
MONOCYTES NFR BLD AUTO: 5.8 % (ref 5–12)
NEUTROPHILS NFR BLD AUTO: 3.86 10*3/MM3 (ref 1.7–7)
NEUTROPHILS NFR BLD AUTO: 70.5 % (ref 42.7–76)
NRBC BLD AUTO-RTO: 0 /100 WBC (ref 0–0.2)
PLATELET # BLD AUTO: 246 10*3/MM3 (ref 140–450)
PMV BLD AUTO: 10 FL (ref 6–12)
POTASSIUM SERPL-SCNC: 4 MMOL/L (ref 3.5–5.2)
PROT SERPL-MCNC: 6.5 G/DL (ref 6–8.5)
RBC # BLD AUTO: 4.39 10*6/MM3 (ref 3.77–5.28)
SODIUM SERPL-SCNC: 139 MMOL/L (ref 136–145)
WBC # BLD AUTO: 5.48 10*3/MM3 (ref 3.4–10.8)

## 2020-10-21 PROCEDURE — 83735 ASSAY OF MAGNESIUM: CPT | Performed by: EMERGENCY MEDICINE

## 2020-10-21 PROCEDURE — 80053 COMPREHEN METABOLIC PANEL: CPT | Performed by: EMERGENCY MEDICINE

## 2020-10-21 PROCEDURE — 99283 EMERGENCY DEPT VISIT LOW MDM: CPT

## 2020-10-21 PROCEDURE — 85025 COMPLETE CBC W/AUTO DIFF WBC: CPT | Performed by: EMERGENCY MEDICINE

## 2020-10-21 RX ADMIN — SODIUM CHLORIDE, POTASSIUM CHLORIDE, SODIUM LACTATE AND CALCIUM CHLORIDE 1000 ML: 600; 310; 30; 20 INJECTION, SOLUTION INTRAVENOUS at 12:24

## 2020-10-21 NOTE — DISCHARGE INSTRUCTIONS
Continue current medications, follow-up with your GI doctor, drink plenty of fluids, return to the emergency department for worsening symptoms as needed.

## 2020-10-21 NOTE — ED NOTES
Pt reports diarrhea x days, states wanted her tested for c-diff, currently on flagyl. No abd pain at this time, 1 episode fo diarrhea this am. Denies fevers. States abcess drained here on Sunday.     Pt masked at . All triage staff wearing appropriate PPE.       Yanely Gilliam, RN  10/21/20 0079

## 2020-10-21 NOTE — ED PROVIDER NOTES
EMERGENCY DEPARTMENT ENCOUNTER    Room Number:  21/21  Date of encounter:  10/21/2020  PCP: Chano Ortiz MD  Historian: Patient      HPI:  Chief Complaint: Diarrhea  A complete HPI/ROS/PMH/PSH/SH/FH are unobtainable due to: None    Context: Doreen Stephenson is a 30 y.o. female who presents to the ED via private vehicle from home for persistent diarrhea since Friday.  I saw the patient on October 14 for possible gluteal abscess, at that time no evidence of drainable abscess with improving symptoms, opted to withhold antibiotics and observe.  She came back on the 18th, similar CT with possible developing phlegmon/small abscess, minimal amount of drainage was obtained on I&D.  She had been on Flagyl by that point, as recommended by her GI doctor prior to being seen by me in the emergency department, this was recommended to be switched to Bactrim on that visit.  She has not taken the Bactrim and continue taking the Flagyl.  She stopped taking the Flagyl yesterday, with decrease in bowel movements from 10 a day to 5 a day yesterday, has had one today.  Denies any fevers, chills, chest pains, shortness breath, cough, nausea, vomiting, continues to eat and drink normally.  No dysuria or urinary frequency.  Has had intermittent mild abdominal cramping similar to her Crohn's disease.      MEDICAL RECORD REVIEW    ED visit October 18, 2020 with I&D of a minor gluteal abscess, switched from Flagyl to Bactrim    PAST MEDICAL HISTORY  Active Ambulatory Problems     Diagnosis Date Noted   • Ventral hernia without obstruction or gangrene 08/10/2018   • Metatarsalgia of right foot 07/13/2020   • Abrasion, foot, right, initial encounter 07/13/2020   • Injury of peroneal tendon of right foot 07/13/2020   • Sprain of right ankle 07/13/2020     Resolved Ambulatory Problems     Diagnosis Date Noted   • No Resolved Ambulatory Problems     Past Medical History:   Diagnosis Date   • Anemia    • Anxiety    • Crohn's disease (CMS/[facility])     • Depression    • Incisional hernia    • PONV (postoperative nausea and vomiting)    • Ventral hernia          PAST SURGICAL HISTORY  Past Surgical History:   Procedure Laterality Date   • COLON RESECTION SMALL BOWEL N/A 08/31/2017    Dr. Rockwell   • COLON RESECTION WITH COLOSTOMY N/A 09/04/2017    Due to anastomosis infection-Dr. Rockwell   • COLONOSCOPY N/A 2016    Dr. Bowser   • ILEOSTOMY CLOSURE N/A 12/12/2017    Dr. Rockwell   • VENTRAL/INCISIONAL HERNIA REPAIR N/A 9/10/2018    Procedure: VENTRAL/INCISIONAL HERNIA REPAIR WITH MESH AND COMPONENT SEPARATION;  Surgeon: Shawn Villasenor Jr., MD;  Location: [facility];  Service: General   • VENTRAL/INCISIONAL HERNIA REPAIR N/A 12/17/2018    Procedure: VENTRAL/INCISIONAL HERNIA REPAIR;  Surgeon: Shawn Villasenor Jr., MD;  Location: [facility] OR;  Service: General   • WISDOM TOOTH EXTRACTION           FAMILY HISTORY  Family History   Problem Relation Age of Onset   • Malig Hyperthermia Neg Hx          SOCIAL HISTORY  Social History     Socioeconomic History   • Marital status: Single     Spouse name: Not on file   • Number of children: Not on file   • Years of education: Not on file   • Highest education level: Not on file   Tobacco Use   • Smoking status: Never Smoker   • Smokeless tobacco: Never Used   Substance and Sexual Activity   • Alcohol use: No   • Drug use: No   • Sexual activity: Defer         ALLERGIES  Morphine        REVIEW OF SYSTEMS  Review of Systems     All systems reviewed and negative except for those discussed in HPI.       PHYSICAL EXAM    I have reviewed the triage vital signs and nursing notes.    ED Triage Vitals [10/21/20 1120]   Temp Heart Rate Resp BP SpO2   98.5 °F (36.9 °C) 106 16 -- 96 %      Temp src Heart Rate Source Patient Position BP Location FiO2 (%)   Tympanic -- -- -- --       Physical Exam  General: Awake, alert, no acute distress, nontoxic, nondiaphoretic  HEENT: Mucous membranes moist, atraumatic, normocephalic,  EOMI  Neck: Full ROM  Pulm: Symmetric, nonlabored, lungs CTAB  Cardiovascular: Regular rate and rhythm, normal S1/S2, intact distal pulses  GI: Soft, nontender, nondistended, no rebound, no guarding, bowel sounds present  MSK: Full ROM, no deformity  Skin: Warm, dry  Neuro: Alert and oriented x 3, GCS 15, moving all extremities, no focal deficits  Psych: Calm, cooperative      Surgical mask, protective eye goggles, and gloves used during this encounter. Patient in surgical mask.      LAB RESULTS  Recent Results (from the past 24 hour(s))   Comprehensive Metabolic Panel    Collection Time: 10/21/20 12:20 PM    Specimen: Blood   Result Value Ref Range    Glucose 88 65 - 99 mg/dL    BUN 7 6 - 20 mg/dL    Creatinine 0.75 0.57 - 1.00 mg/dL    Sodium 139 136 - 145 mmol/L    Potassium 4.0 3.5 - 5.2 mmol/L    Chloride 105 98 - 107 mmol/L    CO2 26.6 22.0 - 29.0 mmol/L    Calcium 9.0 8.6 - 10.5 mg/dL    Total Protein 6.5 6.0 - 8.5 g/dL    Albumin 4.00 3.50 - 5.20 g/dL    ALT (SGPT) 18 1 - 33 U/L    AST (SGOT) 20 1 - 32 U/L    Alkaline Phosphatase 98 39 - 117 U/L    Total Bilirubin 0.2 0.0 - 1.2 mg/dL    eGFR Non African Amer 90 >60 mL/min/1.73    Globulin 2.5 gm/dL    A/G Ratio 1.6 g/dL    BUN/Creatinine Ratio 9.3 7.0 - 25.0    Anion Gap 7.4 5.0 - 15.0 mmol/L   Magnesium    Collection Time: 10/21/20 12:20 PM    Specimen: Blood   Result Value Ref Range    Magnesium 1.7 1.6 - 2.6 mg/dL   CBC Auto Differential    Collection Time: 10/21/20 12:20 PM    Specimen: Blood   Result Value Ref Range    WBC 5.48 3.40 - 10.80 10*3/mm3    RBC 4.39 3.77 - 5.28 10*6/mm3    Hemoglobin 11.3 (L) 12.0 - 15.9 g/dL    Hematocrit 36.2 34.0 - 46.6 %    MCV 82.5 79.0 - 97.0 fL    MCH 25.7 (L) 26.6 - 33.0 pg    MCHC 31.2 (L) 31.5 - 35.7 g/dL    RDW 14.7 12.3 - 15.4 %    RDW-SD 44.5 37.0 - 54.0 fl    MPV 10.0 6.0 - 12.0 fL    Platelets 246 140 - 450 10*3/mm3    Neutrophil % 70.5 42.7 - 76.0 %    Lymphocyte % 22.1 19.6 - 45.3 %    Monocyte % 5.8 5.0 -  12.0 %    Eosinophil % 0.9 0.3 - 6.2 %    Basophil % 0.5 0.0 - 1.5 %    Immature Grans % 0.2 0.0 - 0.5 %    Neutrophils, Absolute 3.86 1.70 - 7.00 10*3/mm3    Lymphocytes, Absolute 1.21 0.70 - 3.10 10*3/mm3    Monocytes, Absolute 0.32 0.10 - 0.90 10*3/mm3    Eosinophils, Absolute 0.05 0.00 - 0.40 10*3/mm3    Basophils, Absolute 0.03 0.00 - 0.20 10*3/mm3    Immature Grans, Absolute 0.01 0.00 - 0.05 10*3/mm3    nRBC 0.0 0.0 - 0.2 /100 WBC       Ordered the above labs and independently reviewed the results.        RADIOLOGY  No Radiology Exams Resulted Within Past 24 Hours        PROCEDURES    Procedures      MEDICATIONS GIVEN IN ER    Medications   lactated ringers bolus 1,000 mL (0 mL Intravenous Stopped 10/21/20 1340)         PROGRESS, DATA ANALYSIS, CONSULTS, AND MEDICAL DECISION MAKING    All labs have been independently reviewed by me.  All radiology studies have been reviewed by me and discussed with radiologist dictating the report.   EKG's independently viewed and interpreted by me.  Discussion below represents my analysis of pertinent findings related to patient's condition, differential diagnosis, treatment plan and final disposition.    This may just represent a Crohn's flare, however, I do feel exceeded testing would be warranted if we can get a stool sample.  We will recheck blood work to evaluate for any new leukocytosis, renal failure, electrolyte abnormalities.  Will give 1 L IV fluid bolus.  Will evaluate any need for further antibiotics, CT scan, admission, GI consult depending on results and reevaluation.    ED Course as of Oct 21 1838   Wed Oct 21, 2020   1346 Patient updated on the reassuring blood work without leukocytosis, no evidence of acute renal failure or electrolyte abnormalities.  Vital signs remained very reassuring.  Has received 1 L of IV fluids.  Has not had any bowel movements here, only 1 for the entire day, with decreased yesterday to 5 from 10 the day before.  My suspicion for C.  difficile is low at this point, we will send her home with a specimen cup with instructions to follow-up with her GI physician.  She is comfortable with the plan moving forward, advised to return to emergency department for worsening symptoms as needed.    [DC]      ED Course User Index  [DC] Gilmar Vo MD       AS OF 18:38 EDT VITALS:    BP - 128/89  HR - 57  TEMP - 98.5 °F (36.9 °C) (Tympanic)  02 SATS - 98%        DIAGNOSIS  Final diagnoses:   Diarrhea, unspecified type   History of Crohn's disease         DISPOSITION  DISCHARGE    Patient discharged in stable condition.    Reviewed implications of results, diagnosis, meds, responsibility to follow up, warning signs and symptoms of possible worsening, potential complications and reasons to return to ER.    Patient/Family voiced understanding of above instructions.    Discussed plan for discharge, as there is no emergent indication for admission. Patient referred to primary care provider for BP management due to today's BP. Pt/family is agreeable and understands need for follow up and repeat testing.  Pt is aware that discharge does not mean that nothing is wrong but it indicates no emergency is present that requires admission and they must continue care with follow-up as given below or physician of their choice.     FOLLOW-UP  [facility] Emergency Department  [address]  [facility] [address] [phone]    As needed, If symptoms worsen    Chano Ortiz MD  [address] [address] [phone]    Schedule an appointment as soon as possible for a visit   As needed    Aren Peña MD  [address] [address]  [facility] [address] [phone]    Call on 10/21/2020           Medication List      No changes were made to your prescriptions during this visit.                    Gilmar Vo MD  10/21/20 2148

## 2020-11-16 ENCOUNTER — OFFICE (AMBULATORY)
Dept: URBAN - METROPOLITAN AREA CLINIC 75 | Facility: CLINIC | Age: 30
End: 2020-11-16

## 2020-11-20 ENCOUNTER — HOSPITAL ENCOUNTER (OUTPATIENT)
Dept: GENERAL RADIOLOGY | Facility: HOSPITAL | Age: 30
Discharge: HOME | End: 2020-11-20

## 2020-11-20 ENCOUNTER — TRANSCRIBE ORDERS (OUTPATIENT)
Dept: LAB | Facility: HOSPITAL | Age: 30
End: 2020-11-20

## 2020-11-20 ENCOUNTER — LAB (OUTPATIENT)
Dept: LAB | Facility: HOSPITAL | Age: 30
End: 2020-11-20

## 2020-11-20 DIAGNOSIS — Z92.25 STATUS POST RECENT IMMUNOSUPPRESSIVE THERAPY: ICD-10-CM

## 2020-11-20 DIAGNOSIS — K50.013 CROHN'S DISEASE OF SMALL INTESTINE WITH FISTULA (HCC): ICD-10-CM

## 2020-11-20 DIAGNOSIS — K50.013 CROHN'S DISEASE OF SMALL INTESTINE WITH FISTULA (HCC): Primary | ICD-10-CM

## 2020-11-20 LAB
ALBUMIN SERPL-MCNC: 4.6 G/DL (ref 3.5–5.2)
ALBUMIN/GLOB SERPL: 1.4 G/DL
ALP SERPL-CCNC: 108 U/L (ref 39–117)
ALT SERPL W P-5'-P-CCNC: 15 U/L (ref 1–33)
ANION GAP SERPL CALCULATED.3IONS-SCNC: 7.9 MMOL/L (ref 5–15)
AST SERPL-CCNC: 19 U/L (ref 1–32)
BASOPHILS # BLD AUTO: 0.05 10*3/MM3 (ref 0–0.2)
BASOPHILS NFR BLD AUTO: 0.9 % (ref 0–1.5)
BILIRUB SERPL-MCNC: <0.2 MG/DL (ref 0–1.2)
BUN SERPL-MCNC: 9 MG/DL (ref 6–20)
BUN/CREAT SERPL: 13.6 (ref 7–25)
CALCIUM SPEC-SCNC: 9.2 MG/DL (ref 8.6–10.5)
CHLORIDE SERPL-SCNC: 106 MMOL/L (ref 98–107)
CO2 SERPL-SCNC: 25.1 MMOL/L (ref 22–29)
CREAT SERPL-MCNC: 0.66 MG/DL (ref 0.57–1)
CRP SERPL-MCNC: 0.12 MG/DL (ref 0–0.5)
DEPRECATED RDW RBC AUTO: 44.1 FL (ref 37–54)
EOSINOPHIL # BLD AUTO: 0.06 10*3/MM3 (ref 0–0.4)
EOSINOPHIL NFR BLD AUTO: 1.1 % (ref 0.3–6.2)
ERYTHROCYTE [DISTWIDTH] IN BLOOD BY AUTOMATED COUNT: 14.5 % (ref 12.3–15.4)
GFR SERPL CREATININE-BSD FRML MDRD: 104 ML/MIN/1.73
GLOBULIN UR ELPH-MCNC: 3.3 GM/DL
GLUCOSE SERPL-MCNC: 94 MG/DL (ref 65–99)
HBV SURFACE AG SERPL QL IA: NORMAL
HCT VFR BLD AUTO: 40.1 % (ref 34–46.6)
HCV AB SER DONR QL: NORMAL
HGB BLD-MCNC: 12.3 G/DL (ref 12–15.9)
IMM GRANULOCYTES # BLD AUTO: 0.03 10*3/MM3 (ref 0–0.05)
IMM GRANULOCYTES NFR BLD AUTO: 0.5 % (ref 0–0.5)
LYMPHOCYTES # BLD AUTO: 1.14 10*3/MM3 (ref 0.7–3.1)
LYMPHOCYTES NFR BLD AUTO: 20.7 % (ref 19.6–45.3)
MCH RBC QN AUTO: 25.6 PG (ref 26.6–33)
MCHC RBC AUTO-ENTMCNC: 30.7 G/DL (ref 31.5–35.7)
MCV RBC AUTO: 83.4 FL (ref 79–97)
MONOCYTES # BLD AUTO: 0.4 10*3/MM3 (ref 0.1–0.9)
MONOCYTES NFR BLD AUTO: 7.2 % (ref 5–12)
NEUTROPHILS NFR BLD AUTO: 3.84 10*3/MM3 (ref 1.7–7)
NEUTROPHILS NFR BLD AUTO: 69.6 % (ref 42.7–76)
NRBC BLD AUTO-RTO: 0 /100 WBC (ref 0–0.2)
PLATELET # BLD AUTO: 290 10*3/MM3 (ref 140–450)
PMV BLD AUTO: 10.3 FL (ref 6–12)
POTASSIUM SERPL-SCNC: 4.5 MMOL/L (ref 3.5–5.2)
PROT SERPL-MCNC: 7.9 G/DL (ref 6–8.5)
RBC # BLD AUTO: 4.81 10*6/MM3 (ref 3.77–5.28)
SODIUM SERPL-SCNC: 139 MMOL/L (ref 136–145)
WBC # BLD AUTO: 5.52 10*3/MM3 (ref 3.4–10.8)

## 2020-11-20 PROCEDURE — 83520 IMMUNOASSAY QUANT NOS NONAB: CPT

## 2020-11-20 PROCEDURE — 86140 C-REACTIVE PROTEIN: CPT

## 2020-11-20 PROCEDURE — 80053 COMPREHEN METABOLIC PANEL: CPT

## 2020-11-20 PROCEDURE — 87340 HEPATITIS B SURFACE AG IA: CPT

## 2020-11-20 PROCEDURE — 36415 COLL VENOUS BLD VENIPUNCTURE: CPT

## 2020-11-20 PROCEDURE — 86141 C-REACTIVE PROTEIN HS: CPT

## 2020-11-20 PROCEDURE — 86803 HEPATITIS C AB TEST: CPT

## 2020-11-20 PROCEDURE — 85025 COMPLETE CBC W/AUTO DIFF WBC: CPT

## 2020-11-20 PROCEDURE — 84999 UNLISTED CHEMISTRY PROCEDURE: CPT

## 2020-11-20 PROCEDURE — 86704 HEP B CORE ANTIBODY TOTAL: CPT

## 2020-11-20 PROCEDURE — 86480 TB TEST CELL IMMUN MEASURE: CPT

## 2020-11-20 PROCEDURE — 71046 X-RAY EXAM CHEST 2 VIEWS: CPT

## 2020-11-21 LAB — HBV CORE AB SERPL QL IA: NEGATIVE

## 2020-11-24 LAB
GAMMA INTERFERON BACKGROUND BLD IA-ACNC: 0.03 IU/ML
M TB IFN-G BLD-IMP: NEGATIVE
M TB IFN-G CD4+ BCKGRND COR BLD-ACNC: 0.05 IU/ML
M TB IFN-G CD4+CD8+ BCKGRND COR BLD-ACNC: 0.04 IU/ML
MITOGEN IGNF BLD-ACNC: >10 IU/ML
SERVICE CMNT-IMP: NORMAL

## 2020-11-28 LAB — REF LAB TEST RESULTS: NORMAL

## 2021-01-06 ENCOUNTER — IMMUNIZATION (OUTPATIENT)
Dept: VACCINE CLINIC | Facility: HOSPITAL | Age: 31
End: 2021-01-06

## 2021-01-06 PROCEDURE — 0001A: CPT | Performed by: INTERNAL MEDICINE

## 2021-01-06 PROCEDURE — 91300 HC SARSCOV02 VAC 30MCG/0.3ML IM: CPT | Performed by: INTERNAL MEDICINE

## 2021-01-18 ENCOUNTER — TELEMEDICINE (OUTPATIENT)
Dept: FAMILY MEDICINE CLINIC | Facility: TELEHEALTH | Age: 31
End: 2021-01-18

## 2021-01-18 DIAGNOSIS — Z20.822 ENCOUNTER BY TELEHEALTH FOR SUSPECTED COVID-19: ICD-10-CM

## 2021-01-18 DIAGNOSIS — Z76.89 RETURN TO WORK EVALUATION: Primary | ICD-10-CM

## 2021-01-18 DIAGNOSIS — K50.10 CROHN'S DISEASE OF COLON WITHOUT COMPLICATION (HCC): ICD-10-CM

## 2021-01-18 PROBLEM — K50.90 CROHN'S DISEASE: Status: ACTIVE | Noted: 2017-12-12

## 2021-01-18 PROBLEM — E87.6 HYPOKALEMIA: Status: ACTIVE | Noted: 2017-12-16

## 2021-01-18 PROBLEM — Z43.2 ATTENTION TO ILEOSTOMY (HCC): Status: ACTIVE | Noted: 2017-12-12

## 2021-01-18 PROBLEM — R19.7 DIARRHEA: Status: ACTIVE | Noted: 2018-04-02

## 2021-01-18 PROCEDURE — BHEMPVIDEOVISIT: Performed by: NURSE PRACTITIONER

## 2021-01-18 RX ORDER — ACETAMINOPHEN 500 MG
1000 TABLET ORAL
COMMUNITY
Start: 2020-10-16 | End: 2021-10-16

## 2021-01-18 RX ORDER — FLUTICASONE PROPIONATE 50 MCG
1 SPRAY, SUSPENSION (ML) NASAL DAILY
COMMUNITY
Start: 2020-10-16 | End: 2021-12-14

## 2021-01-18 RX ORDER — ONDANSETRON 4 MG/1
4 TABLET, ORALLY DISINTEGRATING ORAL
COMMUNITY
Start: 2021-01-12 | End: 2021-12-10

## 2021-01-18 NOTE — PROGRESS NOTES
CHIEF COMPLAINT  Cc: return to work evaluation    HPI  Doreen Stephenson is a 30 y.o. female  presents today for a return to work evaluation. Her symptoms started with a flare up of her Crohn's disease on 01/13/2021. At that time she had diarrhea and nausea. On the next day she developed nasal congestion, loss of taste and ear pain. She was evaluated in the Urgent Care and a COVID-19 test was done. On 01/15/2021 her test results came back negative. Her sense of taste is back. She no longer has nasal congestion or ear pain. She is still fatigued. She had the COVID-19 vaccine on 01/06/2021 and is wondering if that caused her Crohn's flare up and is expresses concerns related to getting the second one.    Review of Systems   Constitutional: Positive for fatigue. Negative for fever.   HENT: Positive for congestion (resolved). Negative for sinus pressure, sinus pain and sore throat.         Loss of taste resolved   Respiratory: Negative for cough, shortness of breath and wheezing.    Cardiovascular: Negative for chest pain.   Gastrointestinal: Positive for diarrhea and nausea. Negative for vomiting.        Hx: chron's disease   Musculoskeletal: Negative for myalgias.   Skin: Negative for rash.   Neurological: Positive for headaches. Negative for dizziness.       Past Medical History:   Diagnosis Date   • Anemia    • Anxiety    • Crohn's disease (CMS/HCC)    • Depression    • Incisional hernia    • PONV (postoperative nausea and vomiting)     USED PATCH PREVIOUSLY   • Ventral hernia        Family History   Problem Relation Age of Onset   • Malig Hyperthermia Neg Hx        Social History     Socioeconomic History   • Marital status: Single     Spouse name: Not on file   • Number of children: Not on file   • Years of education: Not on file   • Highest education level: Not on file   Tobacco Use   • Smoking status: Never Smoker   • Smokeless tobacco: Never Used   Substance and Sexual Activity   • Alcohol use: No   • Drug  use: No   • Sexual activity: Defer         There were no vitals taken for this visit.    PHYSICAL EXAM  Physical Exam   Constitutional: She is oriented to person, place, and time. She appears well-developed and well-nourished.   HENT:   Head: Normocephalic and atraumatic.   Right Ear: Hearing and external ear normal.   Left Ear: Hearing and external ear normal.   Nose: Nose normal.   Eyes: Lids are normal. Right eye exhibits no discharge and no exudate. Left eye exhibits no discharge and no exudate. Right conjunctiva is not injected. Left conjunctiva is not injected.   Pulmonary/Chest: No accessory muscle usage. No tachypnea and no bradypnea.  No respiratory distress.No use of oxygen by nasal cannulaNo use of oxygen by mask noted.  Abdominal: Abdomen appears normal. There is no abdominal tenderness.   Neurological: She is alert and oriented to person, place, and time. No cranial nerve deficit.   Skin: Her skin appears normal.  Psychiatric: She has a normal mood and affect. Her speech is normal and behavior is normal. Judgment and thought content normal.       Please complete the following and provide a copy for this employee to return to [facility]:    1. Does this employee have a negative COVID-19 (PCR) test following onset of the most recent symptoms?  yes    Date of test if available:  January 14, 2021    2. Does this employee have an alternate and independent diagnosis, other than COVID-19, that may account for the presenting symptoms?  no    3. In your opinion, is the employee free from being potentially contagious and cleared to return to work in a healthcare setting? no    JET Mena   01/18/21  09:15 EST               Results for orders placed or performed during the hospital encounter of 01/14/21   COVID-19,[facility] ROUTINE, NP/OP SWAB IN TRANSPORT MEDIA OR ESWAB 72 HR TAT - Swab, Nasopharynx    Specimen: Nasopharynx; Swab   Result Value Ref Range    SARS-CoV-2, DELTA Not  Detected Not Detected       Diagnoses and all orders for this visit:    1. Return to work evaluation (Primary)    2. Crohn's disease of colon without complication (CMS/HCC)    3. Encounter by telehealth for suspected COVID-19    Isolate for 10 days from onset of symptoms and 24 hours fever/symptoms free without fever reducers  COVID-19 test results positive    FOLLOW-UP    As instructed by [facility] for return to work  Follow up with PCP and Gastroenterologist related to Crohn's disease and second COVID-19 vaccine    If symptoms worsen or persist follow up with [facility], Urgent Care or Emergency Room dependent on severity of symptoms    COVID-19  if at any time you experience fever AND/OR cough AND/OR shortness of breath AND/OR weakness you are being advised to go to nearest urgent care or emergency department for evaluation AND/OR testing    Patient verbalizes understanding of medication dosage, comfort measures, instructions for treatment and follow-up.    Patient verbalizes understanding of medication dosage, comfort measures, instructions for treatment and follow-up.    Shonna Morris, APRN  01/18/2021  08:40 EST    This visit was performed via Telehealth.  This patient has been instructed to follow-up with their primary care provider if their symptoms worsen or the treatment provided does not resolve their illness.

## 2021-01-18 NOTE — PATIENT INSTRUCTIONS
Infection Prevention in the Home  If you have an infection, may have been exposed to an infection, or are taking care of someone who has an infection, it is important to know how to keep the infection from spreading. Follow your health care provider's instructions and use these guidelines to help stop the spread of infection.  How infections are spread  In order for an infection to spread, the following must be present:  · A germ. This may be a virus, bacteria, fungus, or parasite.  · A place for the germ to live. This may be:  ? On or in a person, animal, plant, or food.  ? In soil or water.  ? On surfaces, such as a door handle.  · A person or animal who can develop a disease if the germ enters the body (host). The host does not have resistance to the germ.  · A way for the germ to enter the host. This may occur by:  ? Direct contact with an infected person or animal. This can happen through shaking hands or hugging. Some germs can also travel through the air and spread to others. This can happen when an infected person coughs or sneezes on or near other people.  ? Indirect contact. This occurs when the germ enters the host through contact with an infected object. Examples include:  § Eating or drinking food or water that has the germ (is contaminated).  § Touching a contaminated surface with your hands, and then touching your face, eyes, nose, or mouth.  Supplies needed:  · Soap.  · Alcohol-based hand .  · Standard cleaning products.  · Disinfectants, such as bleach.  · Reusable cleaning cloths, sponges, or paper towels.  · Disposable or reusable utility gloves.  How to prevent infection from spreading  There are several things that you can do to help prevent infection from spreading.  Take these general actions  Everyone should take the following actions to prevent the spread of infection:  · Wash your hands often with soap and water for at least 20 seconds. If soap and water are not available, use  alcohol-based hand .  · Avoid touching your face, mouth, nose, or eyes.  · Cough or sneeze into a tissue, sleeve, or elbow instead of into your hand or into the air.  ? If you cough or sneeze into a tissue, throw it away immediately and wash your hands.    Keep your bathroom clean  · Provide soap.  · Change towels and washcloths frequently.  · Change toothbrushes often and store them separately in a clean, dry place.  · Clean and disinfect all surfaces, including the toilet, floor, tub, shower, and sink.  · Do not share personal items, such as razors, toothbrushes, deodorant, fu, brushes, towels, and washcloths.  Maintain hygiene in the kitchen    · Wash your hands before and after preparing food and before you eat.  · Clean the inside of your refrigerator each week.  · Keep your refrigerator set at 40°F (4°C) or less, and set your freezer at 0°F (-18°C) or less.  · Keep work surfaces clean. Disinfect them regularly.  · Wash your dishes in hot, soapy water. Air-dry your dishes or use a .  · Do not share dishes or eating utensils.  Handle food safely  · Store food carefully.  · Refrigerate leftovers promptly in covered containers.  · Throw out stale or spoiled food.  · Thaw foods in the refrigerator or microwave, not at room temperature.  · Serve foods at the proper temperature. Do not eat raw meat. Make sure it is cooked to the appropriate temperature. Cook eggs until they are firm.  · Wash fruits and vegetables under running water.  · Use separate cutting boards, plates, and utensils for raw foods and cooked foods.  · Use a clean spoon each time you sample food while cooking.  Do laundry the right way  · Wear gloves if laundry is visibly soiled.  · Do not shake soiled laundry. Doing that may send germs into the air.  · Wash laundry in hot water.  · If you cannot wash the laundry right away, place it in a plastic bag and wash it as soon as possible.  Be careful around animals and pets  · Wash  your hands before and after touching animals.  · If you have a pet, ensure that your pet stays clean. Do not let people with weak immune systems touch bird droppings, fish tank water, or a litter box.  ? If you have a pet cage or litter box, be sure to clean it every day.  · If you are sick, stay away from animals and have someone else care for them if possible.  How to clean and disinfect objects and surfaces  Precautions  · Some disinfectants work for certain germs and not others. Read the 's instructions or read online resources to determine if the product you are using will work for the germ you are trying to remove.  · If you choose to use bleach, use it safely. Never mix it with other cleaning products, especially those that contain ammonia. This mixture can create a dangerous gas that may be deadly.  · Keep proper movement of fresh air in your home (ventilation).  · Pour used mop water down the utility sink or toilet. Do not pour this water down the kitchen sink.  Objects and surfaces    · If surfaces are visibly soiled, clean them first with soap and water before disinfecting.  · Disinfect surfaces that are frequently touched every day. This may include:  ? Counters.  ? Tables.  ? Doorknobs.  ? Sinks and faucets.  ? Electronics, such as:  § Phones.  § Remote controls.  § Keyboards.  § Computers and tablets.  Cleaning supplies  Some cleaning supplies can breed germs. Take good care of them to prevent germs from spreading. To do this:  · Soak toilet brushes, mops, and sponges in bleach and water for 5 minutes after each use, or according to 's instructions.  · Wash reusable cleaning cloths and sanitize sponges after each use.  · Throw away disposable gloves after one use.  · Replace reusable utility gloves if they are cracked or torn or if they start to peel.  Additional actions if you are sick  If you live with other people:    · Avoid close contact with those around you. Stay at least  3 ft (1 m) away from others, if possible.  · Use a separate bathroom, if possible.  · If possible, sleep in a separate bedroom or in a separate bed to prevent infecting other household members.  ? Change bedroom linens each week or whenever they are soiled.  · Have everyone in the household wash hands often with soap and water. If soap and water are not available, use alcohol-based hand .  In general:  · Stay home except to get medical care. Call ahead before visiting your health care provider.  · Ask others to get groceries and household supplies and to refill prescriptions for you.  · Avoid public areas. Try not to take public transportation.  · If you can, wear a mask if you need to go out of the house, or if you are in close contact with someone who is not sick.  · Avoid visitors until you have completely recovered, or until you have no signs and symptoms of infection.  · Avoid preparing food or providing care for others. If you must prepare food or provide care for others, wear a mask and wash your hands before and after doing these things.  Where to find more information  · Centers for Disease Control and Prevention: www.cdc.gov/nonpharmaceutical-interventions/index.html  · World Health Organization (WHO): www.who.int/infection-prevention/about/en/  · Association for Professionals in Infection Control and Epidemiology: professionals.site.apic.org/settings-of-care/non-healthcare-setting/home/  Summary  · It is important to know how to keep the infection from spreading.  · Make sure everyone in your household washes their hands often with soap and water.  · Disinfect surfaces that are frequently touched every day.  · If you are sick, stay home except to get medical care.  This information is not intended to replace advice given to you by your health care provider. Make sure you discuss any questions you have with your health care provider.  Document Revised: 04/14/2020 Document Reviewed:  03/13/2020  [employer] Patient Education © 2020 [employer]  COVID-19  COVID-19 is a respiratory infection that is caused by a virus called severe acute respiratory syndrome coronavirus 2 (SARS-CoV-2). The disease is also known as coronavirus disease or novel coronavirus. In some people, the virus may not cause any symptoms. In others, it may cause a serious infection. The infection can get worse quickly and can lead to complications, such as:  · Pneumonia, or infection of the lungs.  · Acute respiratory distress syndrome or ARDS. This is a condition in which fluid build-up in the lungs prevents the lungs from filling with air and passing oxygen into the blood.  · Acute respiratory failure. This is a condition in which there is not enough oxygen passing from the lungs to the body or when carbon dioxide is not passing from the lungs out of the body.  · Sepsis or septic shock. This is a serious bodily reaction to an infection.  · Blood clotting problems.  · Secondary infections due to bacteria or fungus.  · Organ failure. This is when your body's organs stop working.  The virus that causes COVID-19 is contagious. This means that it can spread from person to person through droplets from coughs and sneezes (respiratory secretions).  What are the causes?  This illness is caused by a virus. You may catch the virus by:  · Breathing in droplets from an infected person. Droplets can be spread by a person breathing, speaking, singing, coughing, or sneezing.  · Touching something, like a table or a doorknob, that was exposed to the virus (contaminated) and then touching your mouth, nose, or eyes.  What increases the risk?  Risk for infection  You are more likely to be infected with this virus if you:  · Are within 6 feet (2 meters) of a person with COVID-19.  · Provide care for or live with a person who is infected with COVID-19.  · Spend time in crowded indoor spaces or live in shared housing.  Risk for serious illness  You  "are more likely to become seriously ill from the virus if you:  · Are 50 years of age or older. The higher your age, the more you are at risk for serious illness.  · Live in a nursing home or long-term care facility.  · Have cancer.  · Have a long-term (chronic) disease such as:  ? Chronic lung disease, including chronic obstructive pulmonary disease or asthma.  ? A long-term disease that lowers your body's ability to fight infection (immunocompromised).  ? Heart disease, including heart failure, a condition in which the arteries that lead to the heart become narrow or blocked (coronary artery disease), a disease which makes the heart muscle thick, weak, or stiff (cardiomyopathy).  ? Diabetes.  ? Chronic kidney disease.  ? Sickle cell disease, a condition in which red blood cells have an abnormal \"sickle\" shape.  ? Liver disease.  · Are obese.  What are the signs or symptoms?  Symptoms of this condition can range from mild to severe. Symptoms may appear any time from 2 to 14 days after being exposed to the virus. They include:  · A fever or chills.  · A cough.  · Difficulty breathing.  · Headaches, body aches, or muscle aches.  · Runny or stuffy (congested) nose.  · A sore throat.  · New loss of taste or smell.  Some people may also have stomach problems, such as nausea, vomiting, or diarrhea.  Other people may not have any symptoms of COVID-19.  How is this diagnosed?  This condition may be diagnosed based on:  · Your signs and symptoms, especially if:  ? You live in an area with a COVID-19 outbreak.  ? You recently traveled to or from an area where the virus is common.  ? You provide care for or live with a person who was diagnosed with COVID-19.  ? You were exposed to a person who was diagnosed with COVID-19.  · A physical exam.  · Lab tests, which may include:  ? Taking a sample of fluid from the back of your nose and throat (nasopharyngeal fluid), your nose, or your throat using a swab.  ? A sample of mucus " from your lungs (sputum).  ? Blood tests.  · Imaging tests, which may include, X-rays, CT scan, or ultrasound.  How is this treated?  At present, there is no medicine to treat COVID-19. Medicines that treat other diseases are being used on a trial basis to see if they are effective against COVID-19.  Your health care provider will talk with you about ways to treat your symptoms. For most people, the infection is mild and can be managed at home with rest, fluids, and over-the-counter medicines.  Treatment for a serious infection usually takes places in a hospital intensive care unit (ICU). It may include one or more of the following treatments. These treatments are given until your symptoms improve.  · Receiving fluids and medicines through an IV.  · Supplemental oxygen. Extra oxygen is given through a tube in the nose, a face mask, or a saavedra.  · Positioning you to lie on your stomach (prone position). This makes it easier for oxygen to get into the lungs.  · Continuous positive airway pressure (CPAP) or bi-level positive airway pressure (BPAP) machine. This treatment uses mild air pressure to keep the airways open. A tube that is connected to a motor delivers oxygen to the body.  · Ventilator. This treatment moves air into and out of the lungs by using a tube that is placed in your windpipe.  · Tracheostomy. This is a procedure to create a hole in the neck so that a breathing tube can be inserted.  · Extracorporeal membrane oxygenation (ECMO). This procedure gives the lungs a chance to recover by taking over the functions of the heart and lungs. It supplies oxygen to the body and removes carbon dioxide.  Follow these instructions at home:  Lifestyle  · If you are sick, stay home except to get medical care. Your health care provider will tell you how long to stay home. Call your health care provider before you go for medical care.  · Rest at home as told by your health care provider.  · Do not use any products that  contain nicotine or tobacco, such as cigarettes, e-cigarettes, and chewing tobacco. If you need help quitting, ask your health care provider.  · Return to your normal activities as told by your health care provider. Ask your health care provider what activities are safe for you.  General instructions  · Take over-the-counter and prescription medicines only as told by your health care provider.  · Drink enough fluid to keep your urine pale yellow.  · Keep all follow-up visits as told by your health care provider. This is important.  How is this prevented?    There is no vaccine to help prevent COVID-19 infection. However, there are steps you can take to protect yourself and others from this virus.  To protect yourself:   · Do not travel to areas where COVID-19 is a risk. The areas where COVID-19 is reported change often. To identify high-risk areas and travel restrictions, check the [facility] travel website: wwwnc.cdc.gov/travel/notices  · If you live in, or must travel to, an area where COVID-19 is a risk, take precautions to avoid infection.  ? Stay away from people who are sick.  ? Wash your hands often with soap and water for 20 seconds. If soap and water are not available, use an alcohol-based hand .  ? Avoid touching your mouth, face, eyes, or nose.  ? Avoid going out in public, follow guidance from your state and local health authorities.  ? If you must go out in public, wear a cloth face covering or face mask. Make sure your mask covers your nose and mouth.  ? Avoid crowded indoor spaces. Stay at least 6 feet (2 meters) away from others.  ? Disinfect objects and surfaces that are frequently touched every day. This may include:  § Counters and tables.  § Doorknobs and light switches.  § Sinks and faucets.  § Electronics, such as phones, remote controls, keyboards, computers, and tablets.  To protect others:  If you have symptoms of COVID-19, take steps to prevent the virus from spreading to others.  · If  you think you have a COVID-19 infection, contact your health care provider right away. Tell your health care team that you think you may have a COVID-19 infection.  · Stay home. Leave your house only to seek medical care. Do not use public transport.  · Do not travel while you are sick.  · Wash your hands often with soap and water for 20 seconds. If soap and water are not available, use alcohol-based hand .  · Stay away from other members of your household. Let healthy household members care for children and pets, if possible. If you have to care for children or pets, wash your hands often and wear a mask. If possible, stay in your own room, separate from others. Use a different bathroom.  · Make sure that all people in your household wash their hands well and often.  · Cough or sneeze into a tissue or your sleeve or elbow. Do not cough or sneeze into your hand or into the air.  · Wear a cloth face covering or face mask. Make sure your mask covers your nose and mouth.  Where to find more information  · Centers for Disease Control and Prevention: www.cdc.gov/coronavirus/2019-ncov/index.html  · World Health Organization: www.who.int/health-topics/coronavirus  Contact a health care provider if:  · You live in or have traveled to an area where COVID-19 is a risk and you have symptoms of the infection.  · You have had contact with someone who has COVID-19 and you have symptoms of the infection.  Get help right away if:  · You have trouble breathing.  · You have pain or pressure in your chest.  · You have confusion.  · You have bluish lips and fingernails.  · You have difficulty waking from sleep.  · You have symptoms that get worse.  These symptoms may represent a serious problem that is an emergency. Do not wait to see if the symptoms will go away. Get medical help right away. Call your local emergency services (911 in the [place] Do not drive yourself to the hospital. Let the emergency medical personnel know if  you think you have COVID-19.  Summary  · COVID-19 is a respiratory infection that is caused by a virus. It is also known as coronavirus disease or novel coronavirus. It can cause serious infections, such as pneumonia, acute respiratory distress syndrome, acute respiratory failure, or sepsis.  · The virus that causes COVID-19 is contagious. This means that it can spread from person to person through droplets from breathing, speaking, singing, coughing, or sneezing.  · You are more likely to develop a serious illness if you are 50 years of age or older, have a weak immune system, live in a nursing home, or have chronic disease.  · There is no medicine to treat COVID-19. Your health care provider will talk with you about ways to treat your symptoms.  · Take steps to protect yourself and others from infection. Wash your hands often and disinfect objects and surfaces that are frequently touched every day. Stay away from people who are sick and wear a mask if you are sick.  This information is not intended to replace advice given to you by your health care provider. Make sure you discuss any questions you have with your health care provider.  Document Revised: 10/16/2020 Document Reviewed: 01/23/2020  [employer] Patient Education © 2020 [employer]  Crohn's Disease    Crohn's disease is a long-lasting (chronic) disease that affects the gastrointestinal (GI) tract. Crohn's disease often causes irritation and inflammation in the small intestine and the beginning of the large intestine, but it can affect any part of the GI tract. Crohn's disease is part of a group of illnesses that are known as inflammatory bowel disease (IBD).  Crohn's disease may start slowly and get worse over time. Symptoms may come and go. They may also go away for months or even years at a time (remission).  What are the causes?  The exact cause of this condition is not known. It may involve a response that causes your body's disease-fighting (immune)  system to attack healthy cells and tissues (autoimmune response). Bacteria, genes, and your environment may also play a role.  What increases the risk?  The following factors may make you more likely to develop this condition:  · Having a family member who has Crohn's disease, another IBD, or an autoimmune condition.  · Using products that contain nicotine or tobacco, such as cigarettes and e-cigarettes.  · Being in your 20s.  · Having Eastern  ancestry.  What are the signs or symptoms?  The main symptoms of this condition involve your GI tract. These include:  · Diarrhea.  · Pain or cramping in the abdomen. This is commonly felt in the lower right side of the abdomen.  · Frequent watery or bloody stools.  · Constipation. This may mean having:  ? Fewer bowel movements in a week than normal.  ? Difficulty having a bowel movement.  ? Stools that are dry, hard, or larger than normal.  · Rectal bleeding.  · Rectal pain.  · An urgent need to have a bowel movement.  · The feeling that you are not finished having a bowel movement.  Other symptoms may include:  · Unexplained weight loss.  · Fatigue.  · Fever.  · Nausea.  · Loss of appetite.  · Joint pain.  · Vision changes.  · Red bumps or sores on the skin.  · Sores inside the mouth.  How is this diagnosed?  This condition may be diagnosed based on:  · Your symptoms and your medical history.  · A physical exam.  · Tests, which may include:  ? Blood tests.  ? Stool sample tests.  ? Imaging tests, such as X-rays and CT scans.  ? Tests to examine the inside of your intestines using a long, flexible tube that has a light and a camera on the end (endoscopy or colonoscopy).  ? A procedure to remove tissue samples from inside your bowel for testing (biopsy).  You may need to work with a health care provider who specializes in diseases of the digestive tract (gastroenterologist).  How is this treated?  There is no cure for this condition, but treatment can help you manage  your symptoms. Crohn's disease affects each person differently. Your treatment may include:  · Resting your bowels. This involves having a period of healing time when your bowels are not passing stools. This may be done by:  ? Drinking only clear liquids. These are liquids that you can see through, such as water, black coffee, fruit juice without pulp, broth, gelatin, and ice pops.  ? Getting nutrition through an IV for a period of time.  · Medicines. These may be used by themselves or with other treatments (combination therapy). These may include antibiotic medicines. You may be given medicines that help to:  ? Reduce inflammation.  ? Control your immune system activity.  ? Fight infections.  ? Relieve cramps and prevent diarrhea.  ? Control your pain.  · Surgery. You may need surgery if:  ? Medicines and other treatments are not working anymore.  ? You develop complications from severe Crohn's disease.  ? A section of your intestine becomes so damaged that it needs to be removed.  Follow these instructions at home:  Medicines  · Take over-the-counter and prescription medicines only as told by your health care provider.  · If you were prescribed an antibiotic, take it as told by your health care provider. Do not stop taking the antibiotic even if you start to feel better.  · Avoid taking ibuprofen or other NSAID medicines if possible, these can make Crohn's disease worse.  Eating and drinking  · Talk with your health care provider or a diet and nutrition specialist (registered dietitian) about what diet is best for you.  · Drink enough fluid to keep your urine pale yellow.  · If you are taking steroids to reduce inflammation, get plenty of calcium in your diet to help keep your bones healthy. You may also consider taking a calcium supplement with vitamin D.  · Keep a food diary to identify foods that make your symptoms better or worse.  · Avoid foods that cause symptoms.  · Follow instructions from your health care  provider about eating or drinking restrictions if you have worsening symptoms (flare-up).  · Limit alcohol intake to no more than 1 drink a day for nonpregnant women and 2 drinks a day for men. One drink equals 12 oz of beer, 5 oz of wine, or 1½ oz of hard liquor.  General instructions  · Make sure you get all the vaccines that your health care provider recommends, especially pneumonia (pneumococcal) and flu (influenza) vaccines.  · Do not use any products that contain nicotine or tobacco, such as cigarettes and e-cigarettes. If you need help quitting, ask your health care provider.  · Exercise every day, or as often told by your health care provider.  · Keep all follow-up visits as told by your health care provider. This is important.  Contact a health care provider if:  · You have diarrhea, cramps in your abdomen, and other GI problems that are present almost all the time.  · Your symptoms do not improve with treatment.  · You continue to lose weight.  · You develop a rash or sores on your skin.  · You develop eye problems.  · You have a fever.  · Your symptoms get worse.  · You develop new symptoms.  Get help right away if:  · You have bloody diarrhea.  · You have severe pain in your abdomen.  · You cannot pass stools.  Summary  · Crohn's disease affects each person differently. There are multiple treatment options that can help you manage the condition.  · Talk with your health care provider or diet and nutrition specialist (registered dietitian) about what diet is best for you.  · Make sure you get all the vaccines that your health care provider recommends, especially pneumonia (pneumococcal) and flu (influenza) vaccines.  This information is not intended to replace advice given to you by your health care provider. Make sure you discuss any questions you have with your health care provider.  Document Revised: 11/30/2018 Document Reviewed: 08/20/2018  [employer] Patient Education © 2020 [employer]

## 2021-01-28 ENCOUNTER — IMMUNIZATION (OUTPATIENT)
Dept: VACCINE CLINIC | Facility: HOSPITAL | Age: 31
End: 2021-01-28

## 2021-01-28 PROCEDURE — 91300 HC SARSCOV02 VAC 30MCG/0.3ML IM: CPT | Performed by: INTERNAL MEDICINE

## 2021-01-28 PROCEDURE — 0002A: CPT | Performed by: INTERNAL MEDICINE

## 2021-02-02 ENCOUNTER — OFFICE VISIT (OUTPATIENT)
Dept: ORTHOPEDIC SURGERY | Facility: CLINIC | Age: 31
End: 2021-02-02

## 2021-02-02 VITALS — WEIGHT: 178 LBS | TEMPERATURE: 98 F | HEIGHT: 69 IN | BODY MASS INDEX: 26.36 KG/M2

## 2021-02-02 DIAGNOSIS — M54.2 NECK PAIN: ICD-10-CM

## 2021-02-02 DIAGNOSIS — R52 PAIN: Primary | ICD-10-CM

## 2021-02-02 PROCEDURE — 73030 X-RAY EXAM OF SHOULDER: CPT | Performed by: ORTHOPAEDIC SURGERY

## 2021-02-02 PROCEDURE — 99213 OFFICE O/P EST LOW 20 MIN: CPT | Performed by: ORTHOPAEDIC SURGERY

## 2021-02-02 PROCEDURE — 72040 X-RAY EXAM NECK SPINE 2-3 VW: CPT | Performed by: ORTHOPAEDIC SURGERY

## 2021-02-02 RX ORDER — METHYLPREDNISOLONE 4 MG/1
TABLET ORAL
Qty: 21 TABLET | Refills: 0 | Status: SHIPPED | OUTPATIENT
Start: 2021-02-02 | End: 2021-12-10

## 2021-02-02 NOTE — PROGRESS NOTES
New Shoulder      Patient: Doreen Stephenson        YOB: 1990    Medical Record Number: [id number]        Chief Complaints:   Left shoulder pain    History of Present Illness: This is a 30-year-old female who works here presents complaining of left shoulder pain she states on New Year's Day [place] [employer] [place] and then after that noticed pain in the upper traps up into the neck.  No history of similar symptoms she is right-hand dominant but uses her left hand a lot.  She has taken ibuprofen and Aleve which has helped a little bit.  Current symptoms are moderate aching burning worse with driving movement somewhat better with rest and anti-inflammatories past medical history is remarkable for Crohn's disease depression anxiety anemia    Allergies:   Allergies   Allergen Reactions   • Morphine Rash     RASH AROUND IV SITE       Medications:   Home Medications:  Current Outpatient Medications on File Prior to Visit   Medication Sig   • BIOTIN PO Take  by mouth.   • Unable to find 1 each 1 (One) Time. Apple cider vinegar gummies   • ustekinumab (Stelara) 130 MG/26ML solution injection    • Ustekinumab (STELARA) 90 MG/ML solution prefilled syringe Injection Inject 90 mg subcutaneously Every 8 weeks as directed   • venlafaxine (EFFEXOR) 50 MG tablet Take 50 mg by mouth 2 (Two) Times a Day.   • Venlafaxine HCl (EFFEXOR PO)    • acetaminophen (TYLENOL) 500 MG tablet Take 1,000 mg by mouth.   • fluticasone (FLONASE) 50 MCG/ACT nasal spray 1 spray into the nostril(s) as directed by provider Daily.   • ondansetron ODT (ZOFRAN-ODT) 4 MG disintegrating tablet 4 mg.   • predniSONE (DELTASONE) 20 MG tablet 3 (Three) Times a Day.     No current facility-administered medications on file prior to visit.      Current Medications:  Scheduled Meds:  Continuous Infusions:No current facility-administered medications for this visit.     PRN Meds:.    Past Medical History:   Diagnosis Date   • Anemia    • Anxiety   "  • Crohn's disease (CMS/HCC)    • Depression    • Incisional hernia    • PONV (postoperative nausea and vomiting)     USED PATCH PREVIOUSLY   • Ventral hernia         Past Surgical History:   Procedure Laterality Date   • COLON RESECTION SMALL BOWEL N/A 08/31/2017    Dr. Rockwell   • COLON RESECTION WITH COLOSTOMY N/A 09/04/2017    Due to anastomosis infection-Dr. Rockwell   • COLONOSCOPY N/A 2016    Dr. Bowser   • ILEOSTOMY CLOSURE N/A 12/12/2017    Dr. Rockwell   • VENTRAL/INCISIONAL HERNIA REPAIR N/A 9/10/2018    Procedure: VENTRAL/INCISIONAL HERNIA REPAIR WITH MESH AND COMPONENT SEPARATION;  Surgeon: Shawn Villasenor Jr., MD;  Location: [facility];  Service: General   • VENTRAL/INCISIONAL HERNIA REPAIR N/A 12/17/2018    Procedure: VENTRAL/INCISIONAL HERNIA REPAIR;  Surgeon: Shawn Villasenor Jr., MD;  Location: [facility];  Service: General   • WISDOM TOOTH EXTRACTION          Social History     Occupational History   • Not on file   Tobacco Use   • Smoking status: Never Smoker   • Smokeless tobacco: Never Used   Substance and Sexual Activity   • Alcohol use: No   • Drug use: No   • Sexual activity: Defer      Social History     Social History Narrative   • Not on file        Family History   Problem Relation Age of Onset   • Malig Hyperthermia Neg Hx              Review of Systems: 14 point review of systems are remarkable for the pertinent positives listed in the chart by the patient the remainder negative    Review of Systems      Physical Exam: 30 y.o. female  General Appearance:    Alert, cooperative, in no acute distress                   Vitals:    02/02/21 1402   Temp: 98 °F (36.7 °C)   Weight: 80.7 kg (178 lb)   Height: 175.3 cm (69\")   PainSc:   5      Patient is alert and read ×3 no acute distress appears her above-listed at height weight and age.  Affect is normal respiratory rate is normal unlabored. Heart rate regular rate rhythm, sclera, dentition and hearing are normal for the purpose of this " exam.    Ortho Exam Physical exam of the left shoulder reveals no overlying skin changes no lymphedema no lymphadenopathy.  Patient has active flexion 180 with mild symptoms abduction is similar external rotation is to 50 and internal rotation to the upper lumbar spine with mild symptoms.  Patient has good rotator cuff strength 4+ over 5 with isometric strength testing with pain.  Patient has a positive impingement and a positive Hernandez sign.  She does have some decrease in cervical range of motion particularly sidebending and rotation to the left which does reproduce her symptoms.  She is neurologically intact guarded many muscle test which is 5/5.  Patient has a normal elbow exam.  Good distal pulses are presentPatient has pain with overhead activity and a positive Neer sign and a positive empty can sign  They have a positive drop arm any definitive painful arc    Procedures          Radiology:   AP, Scapular Y and Axillary Lateral of the left shoulder were ordered/reviewed to evauate shoulder pain.  Have no comparative films these are essentially normal no acute pathology is appreciated I did take AP and lateral her cervical spine to evaluate her neck of no comparative films she has loss of cervical lordosis but good maintenance of her joint space all the way down to T1  Imaging Results (Most Recent)     Procedure Component Value Units Date/Time    XR Shoulder 2+ View Left [[id number]] Resulted: 02/02/21 1402     Updated: 02/02/21 1402    Impression:      Ordering physician's impression is located in the Encounter Note dated 02/02/21. X-ray performed in the DR room.          Assessment/Plan: Left shoulder pain I really think this is more neck I think it is in the area the upper traps and up into the neck her shoulder exam is pretty normal.  I am going to start her on a Medrol Dosepak she knows to watch her Crohn's she will stop all anti-inflammatories while taking this.  We will start her into some physical  therapy if she fails to improve we will have her see Dr. Schwartz

## 2021-03-10 ENCOUNTER — HOSPITAL ENCOUNTER (OUTPATIENT)
Dept: PHYSICAL THERAPY | Facility: HOSPITAL | Age: 31
Setting detail: THERAPIES SERIES
Discharge: HOME | End: 2021-03-10

## 2021-03-10 VITALS
OXYGEN SATURATION: 99 % | WEIGHT: 177 LBS | HEART RATE: 92 BPM | HEIGHT: 69 IN | SYSTOLIC BLOOD PRESSURE: 120 MMHG | DIASTOLIC BLOOD PRESSURE: 80 MMHG

## 2021-03-10 DIAGNOSIS — R29.3 POOR POSTURE: ICD-10-CM

## 2021-03-10 DIAGNOSIS — M54.2 NECK PAIN: Primary | ICD-10-CM

## 2021-03-10 DIAGNOSIS — M62.89 MUSCLE TIGHTNESS: ICD-10-CM

## 2021-03-10 PROCEDURE — 97161 PT EVAL LOW COMPLEX 20 MIN: CPT

## 2021-03-10 PROCEDURE — 97110 THERAPEUTIC EXERCISES: CPT

## 2021-03-10 PROCEDURE — 97140 MANUAL THERAPY 1/> REGIONS: CPT

## 2021-03-10 NOTE — THERAPY EVALUATION
Outpatient Physical Therapy Ortho Initial Evaluation  [facility]     Patient Name: Doreen Stephenson  : 1990  MRN: [id number]  Today's Date: 3/10/2021      Visit Date: 03/10/2021    Patient Active Problem List   Diagnosis   • Ventral hernia without obstruction or gangrene   • Metatarsalgia of right foot   • Abrasion, foot, right, initial encounter   • Injury of peroneal tendon of right foot   • Sprain of right ankle   • Crohn's disease (CMS/HCC)   • Crohn's disease of ileum (CMS/HCC)   • Diarrhea   • Hypokalemia   • Attention to ileostomy (CMS/HCC)        Past Medical History:   Diagnosis Date   • Anemia    • Anxiety    • Crohn's disease (CMS/HCC)    • Depression    • Incisional hernia    • PONV (postoperative nausea and vomiting)     USED PATCH PREVIOUSLY   • Ventral hernia         Past Surgical History:   Procedure Laterality Date   • COLON RESECTION SMALL BOWEL N/A 2017    Dr. Rockwell   • COLON RESECTION WITH COLOSTOMY N/A 2017    Due to anastomosis infection-Dr. Rockwell   • COLONOSCOPY N/A     Dr. Bowser   • ILEOSTOMY CLOSURE N/A 2017    Dr. Rockwell   • VENTRAL/INCISIONAL HERNIA REPAIR N/A 9/10/2018    Procedure: VENTRAL/INCISIONAL HERNIA REPAIR WITH MESH AND COMPONENT SEPARATION;  Surgeon: Shawn Villasenor Jr., MD;  Location: [facility];  Service: General   • VENTRAL/INCISIONAL HERNIA REPAIR N/A 2018    Procedure: VENTRAL/INCISIONAL HERNIA REPAIR;  Surgeon: hSawn Villasenor Jr., MD;  Location: [facility];  Service: General   • WISDOM TOOTH EXTRACTION         Visit Dx:     ICD-10-CM ICD-9-CM   1. Neck pain  M54.2 723.1   2. Muscle tightness  M62.89 728.9   3. Poor posture  R29.3 781.92         Patient History     Row Name 03/10/21 1600             History    Chief Complaint  Pain  -CA      Type of Pain  Neck pain  -CA      Brief Description of Current Complaint  Doreen Stephenson is a 30 y.o. female who presents today with neck/left shoulder pain. She reports  she went to a [employer] on new years day, and her neck has been bothering her since that time (indicated L UT region). She reports pain has improved somewhat since then but hasn't gone away. Was prescribed dosepack by Dr. Villasenor which helped at the time, but pain came back. XR of neck and L shoulder unremarkable . Doreen Stephenson reports difficulty/increased pain with turning head to left while driving, picking up niece/nephew. Pain relieving factors include heating pad, prescribed dosepack. Pt denies n/t in B hands, sometimes random numbness under R shoulder blade. PMH includes unremarkable.   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  X-ray  -CA      Results of Clinical Tests  see Epic  -CA         Pain     Pain Location  Neck  -CA      Pain at Present  3  -CA      Pain at Best  2  -CA      Pain at Worst  7  -CA      Is your sleep disturbed?  Yes  -CA         Fall Risk Assessment    Any falls in the past year:  No  -CA         Daily Activities    Primary Language  English  -CA      Pt Participated in POC and Goals  Yes  -CA         Safety    Are you being hurt, hit, or frightened by anyone at home or in your life?  No  -CA      Are you being neglected by a caregiver  No  -CA        User Key  (r) = Recorded By, (t) = Taken By, (c) = Cosigned By    Initials Name Provider Type    CA Jill Miranda PT Physical Therapist          PT Ortho     Row Name 03/10/21 1600       Posture/Observations    Posture/Observations Comments  mild fwd head and rounded shoulders  -CA       Sensory Screen for Light Touch- Upper Quarter Clearing    C4 (posterior shoulder)  Intact  -CA    C5 (lateral upper arm)  Intact  -CA    C6 (tip of thumb)  Intact  -CA    C7 (tip of 3rd finger)  Intact  -CA    C8 (tip of 5th finger)  Intact  -CA    T1 (medial lower arm)  Intact  -CA       Myotomal Screen- Upper  Quarter Clearing    Shoulder flexion (C5)  Left:;4- (Good -);Right:;5 (Normal) pain on left  -CA    Elbow flexion/wrist extension (C6)  WNL  -CA    Elbow extension/wrist flexion (C7)  WNL  -CA       Cervical/Shoulder ROM Screen    Cervical flexion  Normal  -CA    Cervical extension  Normal  -CA    Cervical lateral flexion  Impaired  -CA    Cervical rotation  Impaired  -CA       Cervical Palpation    Suboccipital  Left:;Tender;Guarded/taut;Trigger point  -CA    SCM  Left:;Tender;Guarded/taut;Trigger point  -CA    Scalenes  Left:;Tender;Guarded/taut;Trigger point  -CA    Levator Scapula  Left:;Tender;Guarded/taut;Trigger point  -CA    Upper Traps  Left:;Tender;Guarded/taut;Trigger point  -CA       Cervical/Thoracic Special Tests    Cervical Compression (Forarminal Compression vs. Facet Pain)  Negative  -CA    Cervical Distraction (Foraminal Compression vs. Facet Pain)  Negative  -CA       General ROM    Head/Neck/Trunk  Neck Flexion;Neck Lt Lateral Flexion;Neck Rt Lateral Flexion;Neck Lt Rotation;Neck Rt Rotation;Neck Extension  -CA       Head/Neck/Trunk    Neck Extension AROM  50  -CA    Neck Flexion AROM  50 tight on left side  -CA    Neck Lt Lateral Flexion AROM  35  -CA    Neck Rt Lateral Flexion AROM  35 hurts on left  -CA    Neck Lt Rotation AROM  65 hurts  -CA    Neck Rt Rotation AROM  55  -CA       MMT (Manual Muscle Testing)    Rt Upper Ext  Rt Shoulder Internal Rotation;Rt Shoulder External Rotation  -CA    Lt Upper Ext  Lt Shoulder Internal Rotation;Lt Shoulder External Rotation  -CA       MMT Right Upper Ext    Rt Shoulder Internal Rotation MMT, Gross Movement  (5/5) normal  -CA    Rt Shoulder External Rotation MMT, Gross Movement  (4+/5) good plus  -CA       MMT Left Upper Ext    Lt Shoulder Internal Rotation MMT, Gross Movement  (5/5) normal  -CA    Lt Shoulder External Rotation MMT, Gross Movement  (4-/5) good minus  -CA       Sensation    Sensation WNL?  WNL  -CA       Upper Extremity Flexibility     Scalenes  Left:;Moderately limited  -CA    SCM  Left:;Moderately limited  -CA    Upper Trapezius  Left:;Moderately limited  -CA    Levator Scapula  Left:;Moderately limited  -CA      User Key  (r) = Recorded By, (t) = Taken By, (c) = Cosigned By    Initials Name Provider Type    Jlil You PT Physical Therapist                      Therapy Education  Education Details: Access Code: [email or web address]; anatomy, eval findingsPOC  Given: HEP, Symptoms/condition management, Pain management, Posture/body mechanics  Program: New  How Provided: Verbal, Demonstration, Written  Provided to: Patient  Level of Understanding: Teach back education performed, Verbalized, Demonstrated     PT OP Goals     Row Name 03/10/21 1600          PT Short Term Goals    STG Date to Achieve  04/07/21  -CA     STG 1  Pt will be independent with initial HEP and postural awareness to improve pain and symptoms.  -CA     STG 1 Progress  New  -CA     STG 2  Pt will improve cervical rotation to at least 65-70 deg bilaterally to improve ability to drive safely.  -CA     STG 2 Progress  New  -CA        Long Term Goals    LTG Date to Achieve  05/05/21  -CA     LTG 1  Pt will be independent with advance HEP and postural awareness for continued management of pain and symptoms.  -CA     LTG 1 Progress  New  -CA     LTG 2  Pt will improve NDI perceived disability to at least <10% disability to improve overall quality of life.  -CA     LTG 2 Progress  New  -CA     LTG 3  Pt will decrease neck pain from 7/10 to </= 2/10 at the worst to improve participation in ADLs.  -CA     LTG 3 Progress  New  -CA     LTG 4  Pt will report </=3/10 pain in neck while turning head in car to improve safety while driving.   -CA     LTG 4 Progress  New  -CA       User Key  (r) = Recorded By, (t) = Taken By, (c) = Cosigned By    Initials Name Provider Type    Jill You PT Physical Therapist          PT Assessment/Plan     Row Name 03/10/21 1600          PT Assessment     Functional Limitations  Limitation in home management;Limitations in community activities;Limitations in functional capacity and performance;Performance in leisure activities;Performance in self-care ADL;Performance in sport activities;Performance in work activities  -CA     Impairments  Range of motion;Posture;Pain;Muscle strength;Impaired flexibility  -CA     Assessment Comments  Doreen Stephenson is a 30 y.o. female referred to physical therapy for neck pain. She presents with a stable clinical presentation, along with no remarkable comorbidities or personal factors that may impact her progress in the plan of care. Pt presents today with decreased cervical ROM, TTP L UT/scalenes/subocipitals/cspine paraspinals, and poor posture . Her signs and symptoms are consistent with referring diagnosis. The previous impairments limit her ability to drive and perform work duties without pain. Pt will benefit from skilled PT to address the previous impairments and return to PLOF.  -CA     Please refer to paper survey for additional self-reported information  Yes  -CA     Rehab Potential  Good  -CA     Patient/caregiver participated in establishment of treatment plan and goals  Yes  -CA     Patient would benefit from skilled therapy intervention  Yes  -CA        PT Plan    PT Frequency  2x/week  -CA     Predicted Duration of Therapy Intervention (PT)  4-6 visits  -CA     Planned CPT's?  PT EVAL LOW COMPLEXITY: 15368;PT RE-EVAL: 66201;PT THER PROC EA 15 MIN: 16436;PT THER ACT EA 15 MIN: 31314;PT MANUAL THERAPY EA 15 MIN: 42284;PT NEUROMUSC RE-EDUCATION EA 15 MIN: 45568;PT GAIT TRAINING EA 15 MIN: 88423;PT SELF CARE/HOME MGMT/TRAIN EA 15: 06281;PT HOT OR COLD PACK TREAT MCARE;PT ELECTRICAL STIM UNATTEND: ;PT TRACTION CERVICAL: 78834  -CA     PT Plan Comments  next visit: review hep and cont manual. assess for help with decr pain. add sl thoracic arc, supine HA, supine B ER, scap depression/shldr shrug at 30 deg abd,  row, doorway pec str, qped posterior shldr roll. Pt wants to minimize visits so cont to assess when approprite for indep management.  -CA       User Key  (r) = Recorded By, (t) = Taken By, (c) = Cosigned By    Initials Name Provider Type    Jill You, CALLIE Physical Therapist            OP Exercises     Row Name 03/10/21 1600 03/10/21 1500          Subjective Comments    Subjective Comments  pt wants to minimize visits as much as possible  -CA  --        Subjective Pain    Pre-Treatment Pain Level  3  -CA  --     Post-Treatment Pain Level  2  -CA  --        Total Minutes    40705 - PT Therapeutic Exercise Minutes  15  -CA  --     50563 - PT Manual Therapy Minutes  --  8  -CA        Exercise 1    Exercise Name 1  supine chin tuck  -CA  --     Cueing 1  Verbal  -CA  --     Reps 1  10  -CA  --     Time 1  5s  -CA  --        Exercise 2    Exercise Name 2  supine cervical rot AROM  -CA  --     Cueing 2  Verbal  -CA  --     Reps 2  10 b  -CA  --        Exercise 3    Exercise Name 3  seated L UT str holding table  -CA  --     Cueing 3  Verbal  -CA  --     Reps 3  3  -CA  --     Time 3  20s  -CA  --        Exercise 4    Exercise Name 4  seated L scalene str holding clavicle  -CA  --     Cueing 4  Verbal  -CA  --     Reps 4  3  -CA  --     Time 4  20  -CA  --        Exercise 5    Exercise Name 5  post shoulder roll  -CA  --     Cueing 5  Verbal  -CA  --     Reps 5  10  -CA  --        Exercise 6    Exercise Name 6  scap retract  -CA  --     Cueing 6  Verbal  -CA  --     Reps 6  10  -CA  --     Time 6  5s  -CA  --       User Key  (r) = Recorded By, (t) = Taken By, (c) = Cosigned By    Initials Name Provider Type    Jill You PT Physical Therapist           Manual Rx (last 36 hours)      Manual Treatments     Row Name 03/10/21 1500             Total Minutes    72266 - PT Manual Therapy Minutes  8  -CA         Manual Rx 1    Manual Rx 1 Location  c spine  -CA      Manual Rx 1 Type  suboccipital release/  cerivical manual distraction  -CA         Manual Rx 2    Manual Rx 2 Location  L UT, L cspine paraspinals  -CA      Manual Rx 2 Type  STM/TP release gentle  -CA         Manual Rx 3    Manual Rx 3 Location  L UT  -CA      Manual Rx 3 Type  manual str gentle  -CA        User Key  (r) = Recorded By, (t) = Taken By, (c) = Cosigned By    Initials Name Provider Type    Jill You PT Physical Therapist                      Outcome Measure Options: Neck Disability Index (NDI)  Neck Disability Index  Section 1 - Pain Intensity: The pain is very mild at the moment.  Section 2 - Personal Care: I can look after myself normally without causing extra pain.  Section 3 - Lifting: I can lift heavy weights, but it gives me extra pain.  Section 4 - Work: I can do as much work as I want.  Section 5 - Headaches: I have moderate headaches that come infrequently.  Section 6 - Concentration: I can concentrate fully without difficulty.  Section 7 - Sleeping: My sleep is slightly disturbed for less than 1 hour.  Section 8 - Driving: I can drive as long as I want with slight neck pain.  Section 9 - Reading: I can read as much as I want with no neck pain.  Section 10 - Recreation: I have some neck pain with all recreational activities.  Neck Disability Index Score: 7  Neck Disability Index Comments: 14% disability      Time Calculation:     Start Time: 1618  Stop Time: 1700  Time Calculation (min): 42 min  Total Timed Code Minutes- PT: 23 minute(s)     Therapy Charges for Today     Code Description Service Date Service Provider Modifiers Qty    01056738380 HC PT THER PROC EA 15 MIN 3/10/2021 Jill Miranda, PT GP 1    00308719921 HC PT MANUAL THERAPY EA 15 MIN 3/10/2021 Jill Miranda, PT GP 1    39580155610 HC PT EVAL LOW COMPLEXITY 1 3/10/2021 Jill Miranda, PT GP 1          PT G-Codes  Outcome Measure Options: Neck Disability Index (NDI)  Neck Disability Index Score: 7         Jill Miranda PT  3/10/2021

## 2021-03-11 ENCOUNTER — OFFICE (AMBULATORY)
Dept: URBAN - METROPOLITAN AREA CLINIC 94 | Facility: CLINIC | Age: 31
End: 2021-03-11

## 2021-03-11 DIAGNOSIS — Z92.25 PERSONAL HISTORY OF IMMUNOSUPPRESSION THERAPY: ICD-10-CM

## 2021-03-11 DIAGNOSIS — R19.7 DIARRHEA, UNSPECIFIED: ICD-10-CM

## 2021-03-11 DIAGNOSIS — K50.013 CROHN'S DISEASE OF SMALL INTESTINE WITH FISTULA: ICD-10-CM

## 2021-03-11 PROCEDURE — 99213 OFFICE O/P EST LOW 20 MIN: CPT | Performed by: INTERNAL MEDICINE

## 2021-03-17 ENCOUNTER — APPOINTMENT (OUTPATIENT)
Dept: PHYSICAL THERAPY | Facility: HOSPITAL | Age: 31
End: 2021-03-17

## 2021-03-22 ENCOUNTER — APPOINTMENT (OUTPATIENT)
Dept: PHYSICAL THERAPY | Facility: HOSPITAL | Age: 31
End: 2021-03-22

## 2021-03-24 ENCOUNTER — APPOINTMENT (OUTPATIENT)
Dept: PHYSICAL THERAPY | Facility: HOSPITAL | Age: 31
End: 2021-03-24

## 2021-03-29 ENCOUNTER — APPOINTMENT (OUTPATIENT)
Dept: PHYSICAL THERAPY | Facility: HOSPITAL | Age: 31
End: 2021-03-29

## 2021-03-31 ENCOUNTER — APPOINTMENT (OUTPATIENT)
Dept: PHYSICAL THERAPY | Facility: HOSPITAL | Age: 31
End: 2021-03-31

## 2021-04-05 ENCOUNTER — APPOINTMENT (OUTPATIENT)
Dept: PHYSICAL THERAPY | Facility: HOSPITAL | Age: 31
End: 2021-04-05

## 2021-04-07 ENCOUNTER — APPOINTMENT (OUTPATIENT)
Dept: PHYSICAL THERAPY | Facility: HOSPITAL | Age: 31
End: 2021-04-07

## 2021-07-26 ENCOUNTER — OFFICE (AMBULATORY)
Dept: URBAN - METROPOLITAN AREA CLINIC 75 | Facility: CLINIC | Age: 31
End: 2021-07-26

## 2021-07-26 DIAGNOSIS — K50.013 CROHN'S DISEASE OF SMALL INTESTINE WITH FISTULA: ICD-10-CM

## 2021-10-20 RX ORDER — VENLAFAXINE HYDROCHLORIDE 75 MG/1
75 CAPSULE, EXTENDED RELEASE ORAL DAILY
Qty: 90 CAPSULE | Refills: 1 | Status: SHIPPED | OUTPATIENT
Start: 2021-10-20 | End: 2022-04-25 | Stop reason: SDUPTHER

## 2021-10-20 RX ORDER — VENLAFAXINE HYDROCHLORIDE 75 MG/1
CAPSULE, EXTENDED RELEASE ORAL DAILY
Qty: 90 CAPSULE | Refills: 1 | OUTPATIENT
Start: 2021-10-20

## 2021-10-21 ENCOUNTER — OFFICE (AMBULATORY)
Dept: URBAN - METROPOLITAN AREA CLINIC 94 | Facility: CLINIC | Age: 31
End: 2021-10-21

## 2021-10-21 VITALS
WEIGHT: 180 LBS | SYSTOLIC BLOOD PRESSURE: 118 MMHG | HEIGHT: 69 IN | HEART RATE: 81 BPM | DIASTOLIC BLOOD PRESSURE: 84 MMHG | OXYGEN SATURATION: 95 %

## 2021-10-21 DIAGNOSIS — K50.013 CROHN'S DISEASE OF SMALL INTESTINE WITH FISTULA: ICD-10-CM

## 2021-10-21 PROCEDURE — 99214 OFFICE O/P EST MOD 30 MIN: CPT | Performed by: INTERNAL MEDICINE

## 2021-11-02 ENCOUNTER — IMMUNIZATION (OUTPATIENT)
Dept: VACCINE CLINIC | Facility: HOSPITAL | Age: 31
End: 2021-11-02

## 2021-11-02 PROCEDURE — 0004A ADM SARSCOV2 30MCG/0.3ML BOOSTER: CPT | Performed by: INTERNAL MEDICINE

## 2021-11-02 PROCEDURE — 91300 HC SARSCOV02 VAC 30MCG/0.3ML IM: CPT | Performed by: INTERNAL MEDICINE

## 2022-02-21 ENCOUNTER — OFFICE (AMBULATORY)
Dept: URBAN - METROPOLITAN AREA CLINIC 75 | Facility: CLINIC | Age: 32
End: 2022-02-21

## 2022-03-25 ENCOUNTER — TELEPHONE (OUTPATIENT)
Dept: INTERNAL MEDICINE | Facility: CLINIC | Age: 32
End: 2022-03-25

## 2022-03-25 DIAGNOSIS — Z00.00 WELL ADULT EXAM: Primary | ICD-10-CM

## 2022-03-25 NOTE — TELEPHONE ENCOUNTER
Orders for comprehensive labs for annual physical were placed, she can get them anytime, please as noted, schedule appointment to go over them.

## 2022-03-25 NOTE — TELEPHONE ENCOUNTER
Pt has not had regular labs for awhile and she is wanting to get an order for some.She has been feeling run down and not feeling well the last couple of months.She will make a appointment to come in soon.

## 2022-04-08 ENCOUNTER — LAB (OUTPATIENT)
Dept: LAB | Facility: HOSPITAL | Age: 32
End: 2022-04-08

## 2022-04-08 ENCOUNTER — TRANSCRIBE ORDERS (OUTPATIENT)
Dept: ADMINISTRATIVE | Facility: HOSPITAL | Age: 32
End: 2022-04-08

## 2022-04-08 DIAGNOSIS — K50.013 CROHN'S DISEASE OF SMALL INTESTINE WITH FISTULA: Primary | ICD-10-CM

## 2022-04-08 DIAGNOSIS — Z92.25 STATUS POST RECENT IMMUNOSUPPRESSIVE THERAPY: ICD-10-CM

## 2022-04-08 DIAGNOSIS — Z00.00 WELL ADULT EXAM: ICD-10-CM

## 2022-04-08 DIAGNOSIS — K50.013 CROHN'S DISEASE OF SMALL INTESTINE WITH FISTULA: ICD-10-CM

## 2022-04-08 LAB
ALBUMIN SERPL-MCNC: 4.8 G/DL (ref 3.5–5.2)
ALBUMIN/GLOB SERPL: 1.5 G/DL
ALP SERPL-CCNC: 73 U/L (ref 39–117)
ALT SERPL W P-5'-P-CCNC: 16 U/L (ref 1–33)
ANION GAP SERPL CALCULATED.3IONS-SCNC: 10.4 MMOL/L (ref 5–15)
AST SERPL-CCNC: 25 U/L (ref 1–32)
BASOPHILS # BLD AUTO: 0.05 10*3/MM3 (ref 0–0.2)
BASOPHILS NFR BLD AUTO: 1 % (ref 0–1.5)
BILIRUB SERPL-MCNC: 0.2 MG/DL (ref 0–1.2)
BUN SERPL-MCNC: 10 MG/DL (ref 6–20)
BUN/CREAT SERPL: 12.7 (ref 7–25)
CALCIUM SPEC-SCNC: 9.5 MG/DL (ref 8.6–10.5)
CHLORIDE SERPL-SCNC: 102 MMOL/L (ref 98–107)
CHOLEST SERPL-MCNC: 134 MG/DL (ref 0–200)
CO2 SERPL-SCNC: 25.6 MMOL/L (ref 22–29)
CREAT SERPL-MCNC: 0.79 MG/DL (ref 0.57–1)
DEPRECATED RDW RBC AUTO: 54.1 FL (ref 37–54)
EGFRCR SERPLBLD CKD-EPI 2021: 101.4 ML/MIN/1.73
EOSINOPHIL # BLD AUTO: 0.05 10*3/MM3 (ref 0–0.4)
EOSINOPHIL NFR BLD AUTO: 1 % (ref 0.3–6.2)
ERYTHROCYTE [DISTWIDTH] IN BLOOD BY AUTOMATED COUNT: 16.8 % (ref 12.3–15.4)
FERRITIN SERPL-MCNC: 12.4 NG/ML (ref 13–150)
FOLATE SERPL-MCNC: >20 NG/ML (ref 4.78–24.2)
GLOBULIN UR ELPH-MCNC: 3.1 GM/DL
GLUCOSE SERPL-MCNC: 87 MG/DL (ref 65–99)
HCT VFR BLD AUTO: 40.2 % (ref 34–46.6)
HDLC SERPL-MCNC: 69 MG/DL (ref 40–60)
HGB BLD-MCNC: 12.1 G/DL (ref 12–15.9)
IMM GRANULOCYTES # BLD AUTO: 0.01 10*3/MM3 (ref 0–0.05)
IMM GRANULOCYTES NFR BLD AUTO: 0.2 % (ref 0–0.5)
IRON 24H UR-MRATE: 27 MCG/DL (ref 37–145)
IRON SATN MFR SERPL: 5 % (ref 20–50)
LDLC SERPL CALC-MCNC: 49 MG/DL (ref 0–100)
LDLC/HDLC SERPL: 0.7 {RATIO}
LYMPHOCYTES # BLD AUTO: 1.34 10*3/MM3 (ref 0.7–3.1)
LYMPHOCYTES NFR BLD AUTO: 26.4 % (ref 19.6–45.3)
MCH RBC QN AUTO: 25.7 PG (ref 26.6–33)
MCHC RBC AUTO-ENTMCNC: 30.1 G/DL (ref 31.5–35.7)
MCV RBC AUTO: 85.5 FL (ref 79–97)
MONOCYTES # BLD AUTO: 0.33 10*3/MM3 (ref 0.1–0.9)
MONOCYTES NFR BLD AUTO: 6.5 % (ref 5–12)
NEUTROPHILS NFR BLD AUTO: 3.3 10*3/MM3 (ref 1.7–7)
NEUTROPHILS NFR BLD AUTO: 64.9 % (ref 42.7–76)
NRBC BLD AUTO-RTO: 0 /100 WBC (ref 0–0.2)
PLATELET # BLD AUTO: 255 10*3/MM3 (ref 140–450)
PMV BLD AUTO: 10.2 FL (ref 6–12)
POTASSIUM SERPL-SCNC: 4 MMOL/L (ref 3.5–5.2)
PROT SERPL-MCNC: 7.9 G/DL (ref 6–8.5)
RBC # BLD AUTO: 4.7 10*6/MM3 (ref 3.77–5.28)
SODIUM SERPL-SCNC: 138 MMOL/L (ref 136–145)
T4 FREE SERPL-MCNC: 0.99 NG/DL (ref 0.93–1.7)
TIBC SERPL-MCNC: 569 MCG/DL (ref 298–536)
TRANSFERRIN SERPL-MCNC: 382 MG/DL (ref 200–360)
TRIGL SERPL-MCNC: 84 MG/DL (ref 0–150)
TSH SERPL DL<=0.05 MIU/L-ACNC: 4.32 UIU/ML (ref 0.27–4.2)
VIT B12 BLD-MCNC: 284 PG/ML (ref 211–946)
VLDLC SERPL-MCNC: 16 MG/DL (ref 5–40)
WBC NRBC COR # BLD: 5.08 10*3/MM3 (ref 3.4–10.8)

## 2022-04-08 PROCEDURE — 84439 ASSAY OF FREE THYROXINE: CPT

## 2022-04-08 PROCEDURE — 83520 IMMUNOASSAY QUANT NOS NONAB: CPT

## 2022-04-08 PROCEDURE — 82746 ASSAY OF FOLIC ACID SERUM: CPT

## 2022-04-08 PROCEDURE — 80299 QUANTITATIVE ASSAY DRUG: CPT

## 2022-04-08 PROCEDURE — 84466 ASSAY OF TRANSFERRIN: CPT

## 2022-04-08 PROCEDURE — 86141 C-REACTIVE PROTEIN HS: CPT

## 2022-04-08 PROCEDURE — 83540 ASSAY OF IRON: CPT

## 2022-04-08 PROCEDURE — 82397 CHEMILUMINESCENT ASSAY: CPT

## 2022-04-08 PROCEDURE — 82728 ASSAY OF FERRITIN: CPT

## 2022-04-08 PROCEDURE — 82607 VITAMIN B-12: CPT

## 2022-04-08 PROCEDURE — 80050 GENERAL HEALTH PANEL: CPT

## 2022-04-08 PROCEDURE — 82542 COL CHROMOTOGRAPHY QUAL/QUAN: CPT

## 2022-04-08 PROCEDURE — 36415 COLL VENOUS BLD VENIPUNCTURE: CPT

## 2022-04-08 PROCEDURE — 82652 VIT D 1 25-DIHYDROXY: CPT

## 2022-04-08 PROCEDURE — 80061 LIPID PANEL: CPT

## 2022-04-10 LAB — 1,25(OH)2D SERPL-MCNC: 33.4 PG/ML (ref 19.9–79.3)

## 2022-04-14 LAB
REF LAB TEST METHOD: NORMAL
REF LAB TEST METHOD: NORMAL
REFRIGERATED SORT: NORMAL

## 2022-04-18 LAB
USTEKINUMAB AB SERPL IA-MCNC: <40 NG/ML
USTEKINUMAB SERPL IA-MCNC: 0.9 UG/ML

## 2022-04-25 ENCOUNTER — OFFICE VISIT (OUTPATIENT)
Dept: INTERNAL MEDICINE | Facility: CLINIC | Age: 32
End: 2022-04-25

## 2022-04-25 VITALS
SYSTOLIC BLOOD PRESSURE: 121 MMHG | WEIGHT: 187 LBS | BODY MASS INDEX: 27.7 KG/M2 | HEIGHT: 69 IN | HEART RATE: 59 BPM | OXYGEN SATURATION: 98 % | TEMPERATURE: 98.6 F | DIASTOLIC BLOOD PRESSURE: 88 MMHG

## 2022-04-25 DIAGNOSIS — K50.918 CROHN'S DISEASE WITH OTHER COMPLICATION, UNSPECIFIED GASTROINTESTINAL TRACT LOCATION: Primary | ICD-10-CM

## 2022-04-25 DIAGNOSIS — Z00.00 WELL ADULT EXAM: ICD-10-CM

## 2022-04-25 DIAGNOSIS — E55.9 VITAMIN D DEFICIENCY: ICD-10-CM

## 2022-04-25 DIAGNOSIS — F33.41 RECURRENT MAJOR DEPRESSION IN PARTIAL REMISSION: ICD-10-CM

## 2022-04-25 DIAGNOSIS — E53.8 VITAMIN B12 DEFICIENCY: ICD-10-CM

## 2022-04-25 DIAGNOSIS — D50.9 IRON DEFICIENCY ANEMIA, UNSPECIFIED IRON DEFICIENCY ANEMIA TYPE: ICD-10-CM

## 2022-04-25 DIAGNOSIS — E03.8 SUBCLINICAL HYPOTHYROIDISM: ICD-10-CM

## 2022-04-25 PROCEDURE — 99395 PREV VISIT EST AGE 18-39: CPT | Performed by: INTERNAL MEDICINE

## 2022-04-25 RX ORDER — VENLAFAXINE HYDROCHLORIDE 150 MG/1
150 CAPSULE, EXTENDED RELEASE ORAL DAILY
Qty: 90 CAPSULE | Refills: 3 | Status: SHIPPED | OUTPATIENT
Start: 2022-04-25

## 2022-04-25 NOTE — ASSESSMENT & PLAN NOTE
Patient reports increased anxiety as of her 4/22 appointment, she is only on low-dose venlafaxine, will titrate to 150 at this time.

## 2022-04-25 NOTE — PROGRESS NOTES
"Chief Complaint  Annual Exam (Lab work ), Anxiety, and Depression    Subjective      Doreen Stephenson presents to [facility]    History of Present Illness  Pt is a pleasant but unfortunate 31 yo female with underlying IBD, coming in 4/22 for Annual Exam.  We will review her medications, go over her labs in detail, and address any new concerns.    Review of Systems   Constitutional: Negative for appetite change, fatigue and fever.   HENT: Negative for congestion and ear pain.    Eyes: Negative for blurred vision.   Respiratory: Negative for cough, chest tightness, shortness of breath and wheezing.    Cardiovascular: Negative for chest pain, palpitations and leg swelling.   Gastrointestinal: Negative for abdominal pain.   Genitourinary: Negative for difficulty urinating, dysuria and hematuria.   Musculoskeletal: Negative for arthralgias and gait problem.   Skin: Negative for skin lesions.   Neurological: Negative for syncope, memory problem and confusion.   Psychiatric/Behavioral: Positive for depressed mood. Negative for self-injury. The patient is nervous/anxious.        Objective   Vital Signs:   /88   Pulse 59   Temp 98.6 °F (37 °C)   Ht 175.3 cm (69\")   Wt 84.8 kg (187 lb)   SpO2 98%   BMI 27.62 kg/m²           Physical Exam  Vitals and nursing note reviewed.   Constitutional:       General: She is not in acute distress.     Appearance: Normal appearance. She is not toxic-appearing.   HENT:      Head: Atraumatic.      Right Ear: External ear normal.      Left Ear: External ear normal.      Nose: Nose normal.      Mouth/Throat:      Mouth: Mucous membranes are moist.   Eyes:      General:         Right eye: No discharge.         Left eye: No discharge.      Extraocular Movements: Extraocular movements intact.      Pupils: Pupils are equal, round, and reactive to light.   Neck:      Comments: No carotid bruits.  Cardiovascular:      Rate and Rhythm: Normal rate and " regular rhythm.      Pulses: Normal pulses.      Heart sounds: Normal heart sounds. No murmur heard.    No gallop.      Comments: Heart tones normal, no ectopy, no S3.  Pulmonary:      Effort: Pulmonary effort is normal. No respiratory distress.      Breath sounds: No wheezing, rhonchi or rales.      Comments: Lung fields clear bilaterally.  Abdominal:      General: There is no distension.      Palpations: Abdomen is soft. There is no mass.      Tenderness: There is no abdominal tenderness. There is no guarding.   Musculoskeletal:         General: No swelling or tenderness.      Cervical back: No tenderness.      Right lower leg: No edema.      Left lower leg: No edema.      Comments: No edema.   Skin:     General: Skin is warm and dry.      Findings: No rash.   Neurological:      General: No focal deficit present.      Mental Status: She is alert and oriented to person, place, and time. Mental status is at baseline.      Motor: No weakness.      Gait: Gait normal.   Psychiatric:         Mood and Affect: Mood normal.         Thought Content: Thought content normal.          Result Review   The following data was reviewed by: Chano Ortiz MD on 04/25/2022:  [x] Laboratory  [] Microbiology  [] Radiology  [] EKG/telemetry  [] Cardiology/Vascular  [] Pathology  [x] Old records             Assessment and Plan   Diagnoses and all orders for this visit:    1. Crohn's disease with other complication, unspecified gastrointestinal tract location (HCC) (Primary)  Assessment & Plan:  Patient is followed by Dr. Peña.  She is been on Stelara for 2 years as of her 4/22 office visit.  Symptoms have been much better controlled since getting on this, she is can be moved to every 6 week injections here shortly.      2. Well adult exam  Overview:  Preventive measures: were reviewed with the patient at this office visit.  They included but were not limited to discussions in regards to vaccines outstanding, auto safety with seat  belts and other assistive devices, fall prevention, and routine screening studies.    Exercise: No ischemic symptoms with activity at work, walking, housework, etc as of 4/22.  Comprehensive labs: Reviewed all at her 4/22 appointment.    Covid vaccine: Pfizer x3 as of 4/22.  Other vaccines: Up-to-date as of 4/22    MMG: not yet (neg FH)  Colon: per GI = Dr Peña.    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  FH:       3. Vitamin D deficiency  Assessment & Plan:  Vitamin D level was 33 which is fine, patient to continue with included in her multivitamin.      4. Vitamin B12 deficiency  Assessment & Plan:  B12 was low normal at 280, recommend 500 mcg daily.    Orders:  -     Vitamin B12 anemia; Future  -     Folate anemia; Future    5. Iron deficiency anemia, unspecified iron deficiency anemia type  Assessment & Plan:  Her iron level was in the 20s, saturation 95%, agree with 65 mg elemental iron daily for now.  Follow-up levels in 4 months.    Orders:  -     CBC & Differential; Future  -     Iron Profile; Future  -     Ferritin; Future    6. Subclinical hypothyroidism  Assessment & Plan:  TSH is 4.3, will repeat in 4 months, if same or lower could start low-dose therapy.    Orders:  -     TSH; Future  -     T4, free; Future    7. Recurrent major depression in partial remission (HCC)  Assessment & Plan:  Patient reports increased anxiety as of her 4/22 appointment, she is only on low-dose venlafaxine, will titrate to 150 at this time.      Other orders  -     venlafaxine XR (EFFEXOR-XR) 150 MG 24 hr capsule; Take 1 capsule by mouth Daily.  Dispense: 90 capsule; Refill: 3        Follow Up   Return in about 1 year (around 4/25/2023).  Patient was given instructions and counseling regarding her condition or for health maintenance advice. Please see specific information pulled into the AVS if appropriate.

## 2022-04-25 NOTE — ASSESSMENT & PLAN NOTE
Her iron level was in the 20s, saturation 95%, agree with 65 mg elemental iron daily for now.  Follow-up levels in 4 months.

## 2022-04-25 NOTE — PATIENT INSTRUCTIONS
1.  You need about 500 mcg of B12 daily, so either 500 daily or 1000 every other day.    2.  Placed labs for B12, iron, hemoglobin, and TSH.  Do those in about 4 months and call please.

## 2022-04-25 NOTE — ASSESSMENT & PLAN NOTE
Patient is followed by Dr. Peña.  She is been on Stelara for 2 years as of her 4/22 office visit.  Symptoms have been much better controlled since getting on this, she is can be moved to every 6 week injections here shortly.

## 2022-06-08 RX ORDER — METHYLPREDNISOLONE 4 MG/1
TABLET ORAL
Qty: 21 EACH | Refills: 0 | Status: SHIPPED | OUTPATIENT
Start: 2022-06-08 | End: 2022-06-13

## 2022-07-05 RX ORDER — METHYLPREDNISOLONE 4 MG/1
TABLET ORAL
Qty: 21 EACH | Refills: 0 | Status: SHIPPED | OUTPATIENT
Start: 2022-07-05 | End: 2022-07-10

## 2022-07-05 RX ORDER — SCOLOPAMINE TRANSDERMAL SYSTEM 1 MG/1
1 PATCH, EXTENDED RELEASE TRANSDERMAL
Qty: 3 PATCH | Refills: 1 | OUTPATIENT
Start: 2022-07-05 | End: 2023-02-15

## 2022-09-13 ENCOUNTER — OFFICE (AMBULATORY)
Dept: URBAN - METROPOLITAN AREA CLINIC 75 | Facility: CLINIC | Age: 32
End: 2022-09-13

## 2022-09-13 VITALS
SYSTOLIC BLOOD PRESSURE: 122 MMHG | HEIGHT: 69 IN | HEART RATE: 88 BPM | WEIGHT: 181 LBS | OXYGEN SATURATION: 98 % | DIASTOLIC BLOOD PRESSURE: 82 MMHG

## 2022-09-13 DIAGNOSIS — K64.4 RESIDUAL HEMORRHOIDAL SKIN TAGS: ICD-10-CM

## 2022-09-13 DIAGNOSIS — K62.5 HEMORRHAGE OF ANUS AND RECTUM: ICD-10-CM

## 2022-09-13 DIAGNOSIS — K50.013 CROHN'S DISEASE OF SMALL INTESTINE WITH FISTULA: ICD-10-CM

## 2022-09-13 DIAGNOSIS — Z92.25 PERSONAL HISTORY OF IMMUNOSUPPRESSION THERAPY: ICD-10-CM

## 2022-09-13 PROCEDURE — 99215 OFFICE O/P EST HI 40 MIN: CPT | Performed by: NURSE PRACTITIONER

## 2022-09-13 RX ORDER — USTEKINUMAB 90 MG/ML
INJECTION, SOLUTION SUBCUTANEOUS
Qty: 1 | Refills: 1 | Status: COMPLETED
End: 2022-10-03

## 2022-10-13 ENCOUNTER — HOSPITAL ENCOUNTER (EMERGENCY)
Facility: HOSPITAL | Age: 32
Discharge: HOME | End: 2022-10-13
Attending: EMERGENCY MEDICINE | Admitting: EMERGENCY MEDICINE

## 2022-10-13 ENCOUNTER — APPOINTMENT (OUTPATIENT)
Dept: CT IMAGING | Facility: HOSPITAL | Age: 32
End: 2022-10-13

## 2022-10-13 VITALS
TEMPERATURE: 98.9 F | SYSTOLIC BLOOD PRESSURE: 113 MMHG | DIASTOLIC BLOOD PRESSURE: 74 MMHG | HEART RATE: 70 BPM | WEIGHT: 186.95 LBS | RESPIRATION RATE: 16 BRPM | BODY MASS INDEX: 27.69 KG/M2 | HEIGHT: 69 IN | OXYGEN SATURATION: 97 %

## 2022-10-13 DIAGNOSIS — Z87.19 HISTORY OF CROHN'S DISEASE: ICD-10-CM

## 2022-10-13 DIAGNOSIS — R10.84 GENERALIZED ABDOMINAL PAIN: Primary | ICD-10-CM

## 2022-10-13 LAB
ABO GROUP BLD: NORMAL
ALBUMIN SERPL-MCNC: 4.4 G/DL (ref 3.5–5.2)
ALBUMIN/GLOB SERPL: 1.5 G/DL
ALP SERPL-CCNC: 72 U/L (ref 39–117)
ALT SERPL W P-5'-P-CCNC: 15 U/L (ref 1–33)
ANION GAP SERPL CALCULATED.3IONS-SCNC: 14.1 MMOL/L (ref 5–15)
APTT PPP: 33.9 SECONDS (ref 22.7–35.4)
AST SERPL-CCNC: 19 U/L (ref 1–32)
BASOPHILS # BLD AUTO: 0.05 10*3/MM3 (ref 0–0.2)
BASOPHILS NFR BLD AUTO: 1 % (ref 0–1.5)
BILIRUB SERPL-MCNC: 0.3 MG/DL (ref 0–1.2)
BILIRUB UR QL STRIP: NEGATIVE
BLD GP AB SCN SERPL QL: NEGATIVE
BUN SERPL-MCNC: 10 MG/DL (ref 6–20)
BUN/CREAT SERPL: 12.7 (ref 7–25)
CALCIUM SPEC-SCNC: 8.6 MG/DL (ref 8.6–10.5)
CHLORIDE SERPL-SCNC: 100 MMOL/L (ref 98–107)
CLARITY UR: CLEAR
CO2 SERPL-SCNC: 23.9 MMOL/L (ref 22–29)
COLOR UR: YELLOW
CREAT SERPL-MCNC: 0.79 MG/DL (ref 0.57–1)
D-LACTATE SERPL-SCNC: 1 MMOL/L (ref 0.5–2)
DEPRECATED RDW RBC AUTO: 42.6 FL (ref 37–54)
EGFRCR SERPLBLD CKD-EPI 2021: 101.4 ML/MIN/1.73
EOSINOPHIL # BLD AUTO: 0.04 10*3/MM3 (ref 0–0.4)
EOSINOPHIL NFR BLD AUTO: 0.8 % (ref 0.3–6.2)
ERYTHROCYTE [DISTWIDTH] IN BLOOD BY AUTOMATED COUNT: 12.9 % (ref 12.3–15.4)
GLOBULIN UR ELPH-MCNC: 3 GM/DL
GLUCOSE SERPL-MCNC: 87 MG/DL (ref 65–99)
GLUCOSE UR STRIP-MCNC: NEGATIVE MG/DL
HCG SERPL QL: NEGATIVE
HCT VFR BLD AUTO: 39.4 % (ref 34–46.6)
HGB BLD-MCNC: 12.8 G/DL (ref 12–15.9)
HGB UR QL STRIP.AUTO: NEGATIVE
IMM GRANULOCYTES # BLD AUTO: 0.01 10*3/MM3 (ref 0–0.05)
IMM GRANULOCYTES NFR BLD AUTO: 0.2 % (ref 0–0.5)
INR PPP: 0.96 (ref 0.9–1.1)
KETONES UR QL STRIP: NEGATIVE
LEUKOCYTE ESTERASE UR QL STRIP.AUTO: NEGATIVE
LIPASE SERPL-CCNC: 32 U/L (ref 13–60)
LYMPHOCYTES # BLD AUTO: 1.01 10*3/MM3 (ref 0.7–3.1)
LYMPHOCYTES NFR BLD AUTO: 20.5 % (ref 19.6–45.3)
MCH RBC QN AUTO: 29.6 PG (ref 26.6–33)
MCHC RBC AUTO-ENTMCNC: 32.5 G/DL (ref 31.5–35.7)
MCV RBC AUTO: 91.2 FL (ref 79–97)
MONOCYTES # BLD AUTO: 0.38 10*3/MM3 (ref 0.1–0.9)
MONOCYTES NFR BLD AUTO: 7.7 % (ref 5–12)
NEUTROPHILS NFR BLD AUTO: 3.43 10*3/MM3 (ref 1.7–7)
NEUTROPHILS NFR BLD AUTO: 69.8 % (ref 42.7–76)
NITRITE UR QL STRIP: NEGATIVE
NRBC BLD AUTO-RTO: 0 /100 WBC (ref 0–0.2)
PH UR STRIP.AUTO: 6 [PH] (ref 5–8)
PLATELET # BLD AUTO: 267 10*3/MM3 (ref 140–450)
PMV BLD AUTO: 10.2 FL (ref 6–12)
POTASSIUM SERPL-SCNC: 4 MMOL/L (ref 3.5–5.2)
PROT SERPL-MCNC: 7.4 G/DL (ref 6–8.5)
PROT UR QL STRIP: NEGATIVE
PROTHROMBIN TIME: 12.8 SECONDS (ref 11.7–14.2)
RBC # BLD AUTO: 4.32 10*6/MM3 (ref 3.77–5.28)
RH BLD: POSITIVE
SODIUM SERPL-SCNC: 138 MMOL/L (ref 136–145)
SP GR UR STRIP: 1.02 (ref 1–1.03)
T&S EXPIRATION DATE: NORMAL
UROBILINOGEN UR QL STRIP: NORMAL
WBC NRBC COR # BLD: 4.92 10*3/MM3 (ref 3.4–10.8)

## 2022-10-13 PROCEDURE — 85610 PROTHROMBIN TIME: CPT | Performed by: PHYSICIAN ASSISTANT

## 2022-10-13 PROCEDURE — 84703 CHORIONIC GONADOTROPIN ASSAY: CPT | Performed by: PHYSICIAN ASSISTANT

## 2022-10-13 PROCEDURE — 25010000002 IOPAMIDOL 61 % SOLUTION: Performed by: EMERGENCY MEDICINE

## 2022-10-13 PROCEDURE — 74177 CT ABD & PELVIS W/CONTRAST: CPT

## 2022-10-13 PROCEDURE — 99283 EMERGENCY DEPT VISIT LOW MDM: CPT

## 2022-10-13 PROCEDURE — 86850 RBC ANTIBODY SCREEN: CPT | Performed by: PHYSICIAN ASSISTANT

## 2022-10-13 PROCEDURE — 36415 COLL VENOUS BLD VENIPUNCTURE: CPT

## 2022-10-13 PROCEDURE — 25010000002 FENTANYL CITRATE (PF) 50 MCG/ML SOLUTION: Performed by: EMERGENCY MEDICINE

## 2022-10-13 PROCEDURE — 83605 ASSAY OF LACTIC ACID: CPT | Performed by: PHYSICIAN ASSISTANT

## 2022-10-13 PROCEDURE — 96374 THER/PROPH/DIAG INJ IV PUSH: CPT

## 2022-10-13 PROCEDURE — 25010000002 ONDANSETRON PER 1 MG: Performed by: PHYSICIAN ASSISTANT

## 2022-10-13 PROCEDURE — 96375 TX/PRO/DX INJ NEW DRUG ADDON: CPT

## 2022-10-13 PROCEDURE — 86900 BLOOD TYPING SEROLOGIC ABO: CPT | Performed by: PHYSICIAN ASSISTANT

## 2022-10-13 PROCEDURE — 85025 COMPLETE CBC W/AUTO DIFF WBC: CPT | Performed by: PHYSICIAN ASSISTANT

## 2022-10-13 PROCEDURE — 86901 BLOOD TYPING SEROLOGIC RH(D): CPT | Performed by: PHYSICIAN ASSISTANT

## 2022-10-13 PROCEDURE — 81003 URINALYSIS AUTO W/O SCOPE: CPT | Performed by: PHYSICIAN ASSISTANT

## 2022-10-13 PROCEDURE — 80053 COMPREHEN METABOLIC PANEL: CPT | Performed by: PHYSICIAN ASSISTANT

## 2022-10-13 PROCEDURE — 85730 THROMBOPLASTIN TIME PARTIAL: CPT | Performed by: PHYSICIAN ASSISTANT

## 2022-10-13 PROCEDURE — 83690 ASSAY OF LIPASE: CPT | Performed by: PHYSICIAN ASSISTANT

## 2022-10-13 RX ORDER — FENTANYL CITRATE 50 UG/ML
50 INJECTION, SOLUTION INTRAMUSCULAR; INTRAVENOUS ONCE
Status: COMPLETED | OUTPATIENT
Start: 2022-10-13 | End: 2022-10-13

## 2022-10-13 RX ORDER — ONDANSETRON 2 MG/ML
4 INJECTION INTRAMUSCULAR; INTRAVENOUS ONCE
Status: COMPLETED | OUTPATIENT
Start: 2022-10-13 | End: 2022-10-13

## 2022-10-13 RX ADMIN — ONDANSETRON 4 MG: 2 INJECTION INTRAMUSCULAR; INTRAVENOUS at 10:01

## 2022-10-13 RX ADMIN — IOPAMIDOL 85 ML: 612 INJECTION, SOLUTION INTRAVENOUS at 11:14

## 2022-10-13 RX ADMIN — FENTANYL CITRATE 50 MCG: 50 INJECTION INTRAMUSCULAR; INTRAVENOUS at 10:02

## 2022-10-13 RX ADMIN — SODIUM CHLORIDE 1000 ML: 9 INJECTION, SOLUTION INTRAVENOUS at 09:48

## 2022-10-13 NOTE — ED PROVIDER NOTES
MD ATTESTATION NOTE  I wore full protective equipment throughout this patient encounter including an N95 face mask, googles, gown and gloves. Hand hygiene was performed before donning protective equipment and after removal when leaving the room.    The ZOLTAN and I have discussed this patient's history, physical exam, and treatment plan. I have reviewed the documentation and personally had a face to face interaction with the patient. I affirm the ZOLTAN documentation and agree with their diagnostics, findings, treatment, plan, and disposition.    I provided a substantive portion of the care of this patient.  I personally performed the physical exam, in its entirety.  The attached note describes my personal findings.    Doreen Stephenson is a 32 y.o. female who presents to the ED c/o abdominal pain.  Patient reports that she believes that she is having a Crohn's flare, has been having intermittent symptoms for last 2 to 3 weeks.  Patient complains of intermittent lower abdominal pain, pain is sometimes cramping, sometimes sharp.  Patient denies any side events, pain is worsening with bowel movements, no ameliorating factors.  Patient denies any nausea vomiting.  Patient reports that she has been having diarrhea, has been blood in her diarrhea, happening approximately 1-3 times daily.  Patient reports that she has had blood in her stool in the past but is having increased blood.  Patient denies any dysuria, no hematuria, no increased urinary frequency or urgency.  No fever shakes chills or night sweats.  Patient reports that she is followed by Dr. Ya Dang, gastroenterology.  Patient reports history of bowel resection with ostomy and then reversal.      On exam:  General: NAD.  Head: NCAT.  ENT: nares patent, no scleral icterus  Neck: Supple, trachea midline.  Cardiac: regular rate and rhythm.  Lungs: normal effort.  Abdomen: Soft, nondistended, mild lower abdominal tenderness to palpation, negative McBurney's, negative  Al's, no rebound tenderness, no guarding or rigidity.   Extremities: Moves all extremities well, no peripheral edema  Neuro: alert, MAEW, follows commands  Psych: calm, cooperative  Skin: Warm, dry.    Medical Decision Making:  After the initial H&P, I discussed pertinent information from history and physical exam with patient/family.  Discussed differential diagnosis.  Discussed plan for ED evaluation/work-up/treatment.  All questions answered.  Patient/family is agreeable with plan.    ED Course as of 10/13/22 7180   Thu Oct 13, 2022   5145 My differential diagnosis for abdominal pain includes but is not limited to:  Gastritis, gastroenteritis, peptic ulcer disease, GERD, esophageal perforation, acute appendicitis, mesenteric adenitis, Meckel's diverticulum, epiploic appendagitis, diverticulitis, colon cancer, ulcerative colitis, Crohn's disease, intussusception, small bowel obstruction, adhesions, ischemic bowel, perforated viscus, ileus, obstipation, biliary colic, cholecystitis, cholelithiasis, Michael-Efrain Rene, hepatitis, pancreatitis, common bile duct obstruction, cholangitis, bile leak, splenic trauma, splenic rupture, splenic infarction, splenic abscess, abdominal abscess, ascites, spontaneous bacterial peritonitis, hernia, UTI, cystitis, prostatitis, ureterolithiasis, urinary obstruction, ovarian cyst, torsion, pregnancy, ectopic pregnancy, PID, pelvic abscess, mittelschmerz, endometriosis, AAA, myocardial infarction, pneumonia, cancer, porphyria, DKA, medications, sickle cell, viral syndrome, zoster     [JG]   1151 WBC: 4.92 [DC]   1151 Hemoglobin: 12.8 [DC]   1151 Lactate: 1.0 [DC]   1151 Glucose: 87 [DC]   1151 Creatinine: 0.79 [DC]   1151 Sodium: 138 [DC]   1151 Potassium: 4.0 [DC]      ED Course User Index  [DC] Neida Mcdaniel PA  [JG] Arthur Christianson MD       Diagnosis  Final diagnoses:   Generalized abdominal pain   History of Crohn's disease        Arthur Christianson MD  10/13/22 7069

## 2022-10-13 NOTE — ED TRIAGE NOTES
Patient to er from home with c/o increasing in ABD pain with diarrhea/ and some rectal bleeding. Patient reported she has hx of Crohn's. Patient has mask on in triage along with staff.

## 2022-10-14 NOTE — ED PROVIDER NOTES
EMERGENCY DEPARTMENT ENCOUNTER    Room Number: 06/06  Date Seen: 10/13/2022  Time Seen: 09:28 EDT  PCP: Chano Ortiz MD    Historian: Patient      HISTORY OF PRESENT ILLNESS    Chief Complaint: Abdominal pain, rectal bleeding    Context: Doreen Stephenson is a 32 y.o. female with PMHx of Crohn's disease with multiple abdominal surgeries and partial colon resection who presents to the ED with c/o abdominal pain with increased diarrhea and blood in her stool.  She reports mainly bright red but has noticed some darker blood with occasional clots.  She has not had any fevers or chills.  She denies vomiting.  She denies urinary symptoms.  Patient is on Stelara and had last injection 1 week ago.  Patient reports some increased fatigue and occasional lightheadedness.      MEDICAL RECORD REVIEW:    Reviewed in epic    PAST MEDICAL HISTORY    Active Ambulatory Problems     Diagnosis Date Noted   • Ventral hernia without obstruction or gangrene 08/10/2018   • Metatarsalgia of right foot 07/13/2020   • Abrasion, foot, right, initial encounter 07/13/2020   • Injury of peroneal tendon of right foot 07/13/2020   • Sprain of right ankle 07/13/2020   • Crohn's disease ([facility]) 12/12/2017   • Crohn's disease of ileum ([facility]) 08/01/2014   • Diarrhea 04/02/2018   • Hypokalemia 12/16/2017   • Attention to ileostomy ([facility]) 12/12/2017   • Well adult exam 04/25/2022   • Iron deficiency anemia 04/25/2022   • Vitamin D deficiency 04/25/2022   • Vitamin B12 deficiency 04/25/2022   • Subclinical hypothyroidism 04/25/2022   • Recurrent major depression in partial remission ([facility]) 04/25/2022     Resolved Ambulatory Problems     Diagnosis Date Noted   • No Resolved Ambulatory Problems     Past Medical History:   Diagnosis Date   • Anemia    • Anxiety    • Depression    • Incisional hernia    • PONV (postoperative nausea and vomiting)    • Ventral hernia          PAST SURGICAL HISTORY    Past Surgical History:   Procedure Laterality Date   • COLON  RESECTION SMALL BOWEL N/A 08/31/2017    Dr. Rockwell   • COLON RESECTION WITH COLOSTOMY N/A 09/04/2017    Due to anastomosis infection-Dr. Rockwell   • COLONOSCOPY N/A 2016    Dr. Bowser   • ILEOSTOMY CLOSURE N/A 12/12/2017    Dr. Rockwell   • VENTRAL/INCISIONAL HERNIA REPAIR N/A 9/10/2018    Procedure: VENTRAL/INCISIONAL HERNIA REPAIR WITH MESH AND COMPONENT SEPARATION;  Surgeon: Shawn Villasenor Jr., MD;  Location: [facility] OR;  Service: General   • VENTRAL/INCISIONAL HERNIA REPAIR N/A 12/17/2018    Procedure: VENTRAL/INCISIONAL HERNIA REPAIR;  Surgeon: Shawn Villasenor Jr., MD;  Location: [facility] OR;  Service: General   • WISDOM TOOTH EXTRACTION           FAMILY HISTORY    Family History   Problem Relation Age of Onset   • Malig Hyperthermia Neg Hx          SOCIAL HISTORY    Social History     Socioeconomic History   • Marital status: Single   Tobacco Use   • Smoking status: Never   • Smokeless tobacco: Never   Substance and Sexual Activity   • Alcohol use: No   • Drug use: No   • Sexual activity: Defer         ALLERGIES    Morphine      REVIEW OF SYSTEMS    Review of Systems   Constitutional: Positive for fatigue. Negative for chills and fever.   Respiratory: Negative for shortness of breath.    Cardiovascular: Negative for chest pain.   Gastrointestinal: Positive for abdominal pain and blood in stool.   Genitourinary: Negative for dysuria, flank pain and frequency.   Neurological: Positive for light-headedness.       All systems reviewed and negative except those discussed in HPI.      PHYSICAL EXAM    ED Triage Vitals   Temp Heart Rate Resp BP SpO2   10/13/22 0900 10/13/22 0900 10/13/22 0900 10/13/22 0908 10/13/22 0900   98.9 °F (37.2 °C) 117 20 129/93 99 %      Temp src Heart Rate Source Patient Position BP Location FiO2 (%)   10/13/22 0900 10/13/22 1001 -- -- --   Tympanic Monitor          I have reviewed the triage vital signs and nursing notes.    Constitutional: Well appearing, not acutely distressed,  afebrile  Head: Atraumatic, normocephalic  Neck: No midline tenderness, Full painless ROM  Eyes: No scleral icterus, no scleral injection  ENT: Nares patent  CV: Tachycardic rate, regular rhythm, distal pulses symmetric  Respiratory/Chest: No distress, CTAB, no chest wall tenderness  Abdomen: Abdomen soft, diffusely uncomfortable on palpation without focal tenderness  Back: No midline tenderness, Full ROM, No CVA tenderness  Extremities: No deformity, soft compartments, no edema  Skin: Warm, dry, no rash  Neuro: A&Ox4, moves all extremities, follows commands, no focal deficits  Psych: Normal mood        LAB RESULTS    No results found for this or any previous visit (from the past 24 hour(s)).    I ordered the above labs and independently reviewed the results.    RADIOLOGY RESULTS    CT Abdomen Pelvis With Contrast    Result Date: 10/13/2022  CT ABDOMEN AND PELVIS WITH CONTRAST  HISTORY: 32-year-old female with history of Crohn's now with increased diarrhea and pain in stool.  TECHNIQUE: Axial CT images of the abdomen and pelvis were obtained following administration of intravenous contrast. The patient was not given oral contrast Coronal and sagittal reformats were obtained.  COMPARISON: 10/18/2020  FINDINGS: The liver, gallbladder, spleen and pancreas are normal. Bilateral adrenal glands are normal. No renal calculi or hydronephrosis. Urinary bladder is partially distended and normal. The uterus is anteverted and unremarkable for age. No abnormal adnexal mass. Small corpus luteal cyst is seen within the left ovary.  There are changes from prior right hemicolectomy with an ileocolonic anastomosis within the right superior pelvis that is unremarkable. There is no evidence of small bowel obstruction. No appreciable bowel wall thickening is seen. There is a small amount of fluid within the pelvis which may be physiologic or reactive.      There is a small amount of fluid in the pelvis which may be physiologic or  reactive. There is no CT finding of active inflammatory bowel disease.  Radiation dose reduction techniques were utilized, including automated exposure control and exposure modulation based on body size.         I ordered the above noted radiological studies and reviewed the images on the PACS system.     PROCEDURES    None      MEDICATIONS GIVEN IN ER    Medications   sodium chloride 0.9 % bolus 1,000 mL (0 mL Intravenous Stopped 10/13/22 1207)   ondansetron (ZOFRAN) injection 4 mg (4 mg Intravenous Given 10/13/22 1001)   fentaNYL citrate (PF) (SUBLIMAZE) injection 50 mcg (50 mcg Intravenous Given 10/13/22 1002)   iopamidol (ISOVUE-300) 61 % injection 100 mL (85 mL Intravenous Given by Other 10/13/22 1114)         PROGRESS, CONSULTS, and MEDICAL DECISION MAKING    DIFFERENTIAL DIAGNOSIS    Including but not limited to: Crohn's flare, enteritis, pancreatitis, cholecystitis, appendicitis, gastritis      ED Course as of 10/14/22 1108   Thu Oct 13, 2022   0945 My differential diagnosis for abdominal pain includes but is not limited to:  Gastritis, gastroenteritis, peptic ulcer disease, GERD, esophageal perforation, acute appendicitis, mesenteric adenitis, Meckel's diverticulum, epiploic appendagitis, diverticulitis, colon cancer, ulcerative colitis, Crohn's disease, intussusception, small bowel obstruction, adhesions, ischemic bowel, perforated viscus, ileus, obstipation, biliary colic, cholecystitis, cholelithiasis, Michael-Efrain Rene, hepatitis, pancreatitis, common bile duct obstruction, cholangitis, bile leak, splenic trauma, splenic rupture, splenic infarction, splenic abscess, abdominal abscess, ascites, spontaneous bacterial peritonitis, hernia, UTI, cystitis, prostatitis, ureterolithiasis, urinary obstruction, ovarian cyst, torsion, pregnancy, ectopic pregnancy, PID, pelvic abscess, mittelschmerz, endometriosis, AAA, myocardial infarction, pneumonia, cancer, porphyria, DKA, medications, sickle cell, viral  syndrome, zoster     [JG]   1151 WBC: 4.92 [DC]   1151 Hemoglobin: 12.8 [DC]   1151 Lactate: 1.0 [DC]   1151 Glucose: 87 [DC]   1151 Creatinine: 0.79 [DC]   1151 Sodium: 138 [DC]   1151 Potassium: 4.0 [DC]      ED Course User Index  [DC] Neida Mcdaniel PA  [JG] Arthur Christianson MD     Patient presents for evaluation of worsening abdominal pain with increased blood in stool from baseline.  Mildly tachycardic on initial exam but improves with IV fluids.  Labs show normal white count, normal hemoglobin, normal BUN.  Normal lipase.  Negative pregnancy test.  Normal lactic acid.  CT imaging obtained given patient's history of Crohn's disease and abdominal surgeries but was normal without obvious inflammatory changes, abscess, perforation.  Will discharge with recommendation to follow closely with her GI provider.  Discussed ER return precautions.      DIAGNOSIS  Final diagnoses:   Generalized abdominal pain   History of Crohn's disease       DISPOSITION  ED Disposition     ED Disposition   Discharge    Condition   Stable    Comment   --             FOLLOW UP  Chano Ortiz MD  [address] [address]  [phone]    Schedule an appointment as soon as possible for a visit       Your gastroenterologist    Schedule an appointment as soon as possible for a visit       [facility] Emergency Department  [address] [facility] [address] [phone]    As needed, If symptoms worsen      DISCHARGE RX     Medication List      No changes were made to your prescriptions during this visit.             Patient was placed in face mask in first look. Patient was wearing facemask when I entered the room and throughout our encounter. I wore full protective equipment throughout this patient encounter including a face mask, and gloves. Hand hygiene was performed before donning protective equipment and after removal when leaving the room.    Dictated utilizing Dragon  dictation.      Note Disclaimer: At [facility], we believe that sharing information builds trust and better relationships. You are receiving this note because you recently visited [facility]. It is possible you will see health information before a provider has talked with you about it. This kind of information can be easy to misunderstand. To help you fully understand what it means for your health, we urge you to discuss this note with your provider.           Neida Mcdaniel PA  10/14/22 1108

## 2023-01-27 RX ORDER — VENLAFAXINE HYDROCHLORIDE 75 MG/1
225 CAPSULE, EXTENDED RELEASE ORAL DAILY
Qty: 270 CAPSULE | Refills: 1 | Status: SHIPPED | OUTPATIENT
Start: 2023-01-27 | End: 2023-02-01 | Stop reason: SDUPTHER

## 2023-01-30 RX ORDER — VENLAFAXINE HYDROCHLORIDE 75 MG/1
225 CAPSULE, EXTENDED RELEASE ORAL DAILY
Qty: 270 CAPSULE | Refills: 1 | OUTPATIENT
Start: 2023-01-30

## 2023-02-01 ENCOUNTER — OFFICE (AMBULATORY)
Dept: URBAN - METROPOLITAN AREA CLINIC 75 | Facility: CLINIC | Age: 33
End: 2023-02-01

## 2023-02-01 DIAGNOSIS — K50.013 CROHN'S DISEASE OF SMALL INTESTINE WITH FISTULA: ICD-10-CM

## 2023-02-01 RX ORDER — VENLAFAXINE HYDROCHLORIDE 75 MG/1
225 CAPSULE, EXTENDED RELEASE ORAL DAILY
Qty: 270 CAPSULE | Refills: 1 | Status: SHIPPED | OUTPATIENT
Start: 2023-02-01

## 2023-02-12 ENCOUNTER — HOSPITAL ENCOUNTER (EMERGENCY)
Facility: HOSPITAL | Age: 33
Discharge: HOME | End: 2023-02-12
Attending: EMERGENCY MEDICINE | Admitting: EMERGENCY MEDICINE
Payer: COMMERCIAL

## 2023-02-12 ENCOUNTER — APPOINTMENT (OUTPATIENT)
Dept: CT IMAGING | Facility: HOSPITAL | Age: 33
End: 2023-02-12
Payer: COMMERCIAL

## 2023-02-12 VITALS
OXYGEN SATURATION: 97 % | WEIGHT: 180 LBS | RESPIRATION RATE: 16 BRPM | BODY MASS INDEX: 26.66 KG/M2 | HEIGHT: 69 IN | TEMPERATURE: 98.4 F | SYSTOLIC BLOOD PRESSURE: 112 MMHG | HEART RATE: 63 BPM | DIASTOLIC BLOOD PRESSURE: 79 MMHG

## 2023-02-12 DIAGNOSIS — R10.84 GENERALIZED ABDOMINAL PAIN: Primary | ICD-10-CM

## 2023-02-12 LAB
ALBUMIN SERPL-MCNC: 4.1 G/DL (ref 3.5–5.2)
ALBUMIN/GLOB SERPL: 1.5 G/DL
ALP SERPL-CCNC: 110 U/L (ref 39–117)
ALT SERPL W P-5'-P-CCNC: 18 U/L (ref 1–33)
ANION GAP SERPL CALCULATED.3IONS-SCNC: 8 MMOL/L (ref 5–15)
AST SERPL-CCNC: 25 U/L (ref 1–32)
BASOPHILS # BLD AUTO: 0.06 10*3/MM3 (ref 0–0.2)
BASOPHILS NFR BLD AUTO: 0.9 % (ref 0–1.5)
BILIRUB SERPL-MCNC: <0.2 MG/DL (ref 0–1.2)
BILIRUB UR QL STRIP: NEGATIVE
BUN SERPL-MCNC: 14 MG/DL (ref 6–20)
BUN/CREAT SERPL: 16.7 (ref 7–25)
CALCIUM SPEC-SCNC: 8.9 MG/DL (ref 8.6–10.5)
CHLORIDE SERPL-SCNC: 107 MMOL/L (ref 98–107)
CLARITY UR: CLEAR
CO2 SERPL-SCNC: 25 MMOL/L (ref 22–29)
COLOR UR: YELLOW
CREAT SERPL-MCNC: 0.84 MG/DL (ref 0.57–1)
DEPRECATED RDW RBC AUTO: 46 FL (ref 37–54)
EGFRCR SERPLBLD CKD-EPI 2021: 94.2 ML/MIN/1.73
EOSINOPHIL # BLD AUTO: 0.11 10*3/MM3 (ref 0–0.4)
EOSINOPHIL NFR BLD AUTO: 1.7 % (ref 0.3–6.2)
ERYTHROCYTE [DISTWIDTH] IN BLOOD BY AUTOMATED COUNT: 14.1 % (ref 12.3–15.4)
GLOBULIN UR ELPH-MCNC: 2.8 GM/DL
GLUCOSE SERPL-MCNC: 85 MG/DL (ref 65–99)
GLUCOSE UR STRIP-MCNC: NEGATIVE MG/DL
HCG SERPL QL: NEGATIVE
HCT VFR BLD AUTO: 38.1 % (ref 34–46.6)
HGB BLD-MCNC: 12.3 G/DL (ref 12–15.9)
HGB UR QL STRIP.AUTO: NEGATIVE
IMM GRANULOCYTES # BLD AUTO: 0.01 10*3/MM3 (ref 0–0.05)
IMM GRANULOCYTES NFR BLD AUTO: 0.2 % (ref 0–0.5)
KETONES UR QL STRIP: NEGATIVE
LEUKOCYTE ESTERASE UR QL STRIP.AUTO: NEGATIVE
LIPASE SERPL-CCNC: 61 U/L (ref 13–60)
LYMPHOCYTES # BLD AUTO: 1.52 10*3/MM3 (ref 0.7–3.1)
LYMPHOCYTES NFR BLD AUTO: 24 % (ref 19.6–45.3)
MCH RBC QN AUTO: 29.1 PG (ref 26.6–33)
MCHC RBC AUTO-ENTMCNC: 32.3 G/DL (ref 31.5–35.7)
MCV RBC AUTO: 90.1 FL (ref 79–97)
MONOCYTES # BLD AUTO: 0.58 10*3/MM3 (ref 0.1–0.9)
MONOCYTES NFR BLD AUTO: 9.2 % (ref 5–12)
NEUTROPHILS NFR BLD AUTO: 4.05 10*3/MM3 (ref 1.7–7)
NEUTROPHILS NFR BLD AUTO: 64 % (ref 42.7–76)
NITRITE UR QL STRIP: NEGATIVE
NRBC BLD AUTO-RTO: 0 /100 WBC (ref 0–0.2)
PH UR STRIP.AUTO: 6 [PH] (ref 5–8)
PLATELET # BLD AUTO: 233 10*3/MM3 (ref 140–450)
PMV BLD AUTO: 10.4 FL (ref 6–12)
POTASSIUM SERPL-SCNC: 3.9 MMOL/L (ref 3.5–5.2)
PROT SERPL-MCNC: 6.9 G/DL (ref 6–8.5)
PROT UR QL STRIP: NEGATIVE
RBC # BLD AUTO: 4.23 10*6/MM3 (ref 3.77–5.28)
SODIUM SERPL-SCNC: 140 MMOL/L (ref 136–145)
SP GR UR STRIP: 1.02 (ref 1–1.03)
UROBILINOGEN UR QL STRIP: NORMAL
WBC NRBC COR # BLD: 6.33 10*3/MM3 (ref 3.4–10.8)

## 2023-02-12 PROCEDURE — 80053 COMPREHEN METABOLIC PANEL: CPT | Performed by: PHYSICIAN ASSISTANT

## 2023-02-12 PROCEDURE — 85025 COMPLETE CBC W/AUTO DIFF WBC: CPT | Performed by: PHYSICIAN ASSISTANT

## 2023-02-12 PROCEDURE — 99283 EMERGENCY DEPT VISIT LOW MDM: CPT

## 2023-02-12 PROCEDURE — 25010000002 ONDANSETRON PER 1 MG: Performed by: PHYSICIAN ASSISTANT

## 2023-02-12 PROCEDURE — 81003 URINALYSIS AUTO W/O SCOPE: CPT | Performed by: PHYSICIAN ASSISTANT

## 2023-02-12 PROCEDURE — 36415 COLL VENOUS BLD VENIPUNCTURE: CPT

## 2023-02-12 PROCEDURE — 96376 TX/PRO/DX INJ SAME DRUG ADON: CPT

## 2023-02-12 PROCEDURE — 74177 CT ABD & PELVIS W/CONTRAST: CPT

## 2023-02-12 PROCEDURE — 96375 TX/PRO/DX INJ NEW DRUG ADDON: CPT

## 2023-02-12 PROCEDURE — 25010000002 IOPAMIDOL 61 % SOLUTION: Performed by: EMERGENCY MEDICINE

## 2023-02-12 PROCEDURE — 96374 THER/PROPH/DIAG INJ IV PUSH: CPT

## 2023-02-12 PROCEDURE — 25010000002 HYDROMORPHONE PER 4 MG: Performed by: EMERGENCY MEDICINE

## 2023-02-12 PROCEDURE — 84703 CHORIONIC GONADOTROPIN ASSAY: CPT | Performed by: PHYSICIAN ASSISTANT

## 2023-02-12 PROCEDURE — 83690 ASSAY OF LIPASE: CPT | Performed by: PHYSICIAN ASSISTANT

## 2023-02-12 RX ORDER — HYDROMORPHONE HYDROCHLORIDE 1 MG/ML
0.5 INJECTION, SOLUTION INTRAMUSCULAR; INTRAVENOUS; SUBCUTANEOUS ONCE
Status: COMPLETED | OUTPATIENT
Start: 2023-02-12 | End: 2023-02-12

## 2023-02-12 RX ORDER — HYDROCODONE BITARTRATE AND ACETAMINOPHEN 7.5; 325 MG/1; MG/1
1 TABLET ORAL EVERY 4 HOURS PRN
Qty: 16 TABLET | Refills: 0 | Status: SHIPPED | OUTPATIENT
Start: 2023-02-12

## 2023-02-12 RX ORDER — ONDANSETRON 2 MG/ML
4 INJECTION INTRAMUSCULAR; INTRAVENOUS ONCE
Status: COMPLETED | OUTPATIENT
Start: 2023-02-12 | End: 2023-02-12

## 2023-02-12 RX ORDER — SODIUM CHLORIDE 0.9 % (FLUSH) 0.9 %
10 SYRINGE (ML) INJECTION AS NEEDED
Status: DISCONTINUED | OUTPATIENT
Start: 2023-02-12 | End: 2023-02-12 | Stop reason: HOSPADM

## 2023-02-12 RX ADMIN — IOPAMIDOL 85 ML: 612 INJECTION, SOLUTION INTRAVENOUS at 06:12

## 2023-02-12 RX ADMIN — HYDROMORPHONE HYDROCHLORIDE 0.5 MG: 1 INJECTION, SOLUTION INTRAMUSCULAR; INTRAVENOUS; SUBCUTANEOUS at 04:21

## 2023-02-12 RX ADMIN — HYDROMORPHONE HYDROCHLORIDE 0.5 MG: 1 INJECTION, SOLUTION INTRAMUSCULAR; INTRAVENOUS; SUBCUTANEOUS at 05:32

## 2023-02-12 RX ADMIN — ONDANSETRON 4 MG: 2 INJECTION INTRAMUSCULAR; INTRAVENOUS at 04:21

## 2023-02-12 RX ADMIN — Medication 10 ML: at 05:33

## 2023-02-12 RX ADMIN — SODIUM CHLORIDE, POTASSIUM CHLORIDE, SODIUM LACTATE AND CALCIUM CHLORIDE 1000 ML: 600; 310; 30; 20 INJECTION, SOLUTION INTRAVENOUS at 04:21

## 2023-02-12 NOTE — ED PROVIDER NOTES
EMERGENCY DEPARTMENT ENCOUNTER    Room Number:  02/02  Date seen:  2/16/2023  PCP: Chano Ortiz MD      HPI:  Chief Complaint: Abdominal pain  A complete HPI/ROS/PMH/PSH/SH/FH are unobtainable due to: Nothing  Context: Doreen Stephenson is a 32 y.o. female who presents to the ED c/o abdominal pain that began yesterday evening and has gradually progressed since onset.  At present the patient describes her pain as sharp, 9/10 on pain scale, located in the center of the abdomen and periumbilical region.  Patient states she has a past medical history of Crohn's disease and is followed by Dr. Peña.  She is on Stelara every 6 weeks.          PAST MEDICAL HISTORY  Active Ambulatory Problems     Diagnosis Date Noted   • Ventral hernia without obstruction or gangrene 08/10/2018   • Metatarsalgia of right foot 07/13/2020   • Abrasion, foot, right, initial encounter 07/13/2020   • Injury of peroneal tendon of right foot 07/13/2020   • Sprain of right ankle 07/13/2020   • Crohn's disease (Formerly [facility]) 12/12/2017   • Crohn's disease of ileum (Formerly [facility]) 08/01/2014   • Diarrhea 04/02/2018   • Hypokalemia 12/16/2017   • Attention to ileostomy (Formerly [facility]) 12/12/2017   • Well adult exam 04/25/2022   • Iron deficiency anemia 04/25/2022   • Vitamin D deficiency 04/25/2022   • Vitamin B12 deficiency 04/25/2022   • Subclinical hypothyroidism 04/25/2022   • Recurrent major depression in partial remission ([facility]) 04/25/2022     Resolved Ambulatory Problems     Diagnosis Date Noted   • No Resolved Ambulatory Problems     Past Medical History:   Diagnosis Date   • Anemia    • Anxiety    • Depression    • Incisional hernia    • PONV (postoperative nausea and vomiting)    • Ventral hernia          PAST SURGICAL HISTORY  Past Surgical History:   Procedure Laterality Date   • COLON RESECTION SMALL BOWEL N/A 08/31/2017    Dr. Rockwell   • COLON RESECTION WITH COLOSTOMY N/A 09/04/2017    Due to anastomosis infection-Dr. Rockwell   • COLONOSCOPY N/A 2016      Niels   • ILEOSTOMY CLOSURE N/A 12/12/2017    Dr. Rockwell   • VENTRAL/INCISIONAL HERNIA REPAIR N/A 9/10/2018    Procedure: VENTRAL/INCISIONAL HERNIA REPAIR WITH MESH AND COMPONENT SEPARATION;  Surgeon: Shawn Villasenor Jr., MD;  Location: [facility];  Service: General   • VENTRAL/INCISIONAL HERNIA REPAIR N/A 12/17/2018    Procedure: VENTRAL/INCISIONAL HERNIA REPAIR;  Surgeon: Shawn Villasenor Jr., MD;  Location: [facility];  Service: General   • WISDOM TOOTH EXTRACTION           FAMILY HISTORY  Family History   Problem Relation Age of Onset   • Malig Hyperthermia Neg Hx          SOCIAL HISTORY  Social History     Socioeconomic History   • Marital status: Single   Tobacco Use   • Smoking status: Never   • Smokeless tobacco: Never   Vaping Use   • Vaping Use: Never used   Substance and Sexual Activity   • Alcohol use: No   • Drug use: No   • Sexual activity: Defer         ALLERGIES  Morphine        REVIEW OF SYSTEMS  Review of Systems     All systems reviewed and negative except for those discussed in HPI.       PHYSICAL EXAM  ED Triage Vitals [02/12/23 0325]   Temp Heart Rate Resp BP SpO2   98.4 °F (36.9 °C) 87 16 115/85 99 %      Temp src Heart Rate Source Patient Position BP Location FiO2 (%)   Tympanic Monitor Standing Right arm --       Physical Exam      GENERAL: Very pleasant, nontoxic does appear slightly,  HENT: nares patent  EYES: no scleral icterus  CV: regular rhythm, normal rate  RESPIRATORY: normal effort  ABDOMEN: TTP diffuse abdomen, no guarding, no rebound.  Most tender periumbilical region.  No mass noted  MUSCULOSKELETAL: no deformity  NEURO: alert, moves all extremities, follows commands  PSYCH:  calm, cooperative  SKIN: warm, dry    Vital signs and nursing notes reviewed.          LAB RESULTS  Recent Results (from the past 24 hour(s))   POCT VERITOR SARS-CoV-2 Antigen ([id number])    Collection Time: 02/15/23 10:06 AM    Specimen: Nasopharynx; Swab   Result Value Ref Range    SARS Antigen  Not Detected     Internal Control Passed     Lot Number [id number]     Expiration Date 06/04/2023        Ordered the above labs and reviewed the results.        RADIOLOGY  No Radiology Exams Resulted Within Past 24 Hours    Ordered the above noted radiological studies. Reviewed by me in PACS.          PROCEDURES  Procedures          MEDICATIONS GIVEN IN ER  Medications   lactated ringers bolus 1,000 mL (0 mL Intravenous Stopped 2/12/23 0532)   ondansetron (ZOFRAN) injection 4 mg (4 mg Intravenous Given 2/12/23 0421)   HYDROmorphone (DILAUDID) injection 0.5 mg (0.5 mg Intravenous Given 2/12/23 0421)   HYDROmorphone (DILAUDID) injection 0.5 mg (0.5 mg Intravenous Given 2/12/23 0532)   iopamidol (ISOVUE-300) 61 % injection 100 mL (85 mL Intravenous Given by Other 2/12/23 0612)         MEDICAL DECISION MAKING, PROGRESS, and CONSULTS    All labs have been independently reviewed by me.  All radiology studies have been reviewed by me and discussed with radiologist dictating the report.   EKG's independently viewed and interpreted by me.  Discussion below represents my analysis of pertinent findings related to patient's condition, differential diagnosis, treatment plan and final disposition.      Orders placed during this visit:  Orders Placed This Encounter   Procedures   • CT Abdomen Pelvis With Contrast   • Comprehensive Metabolic Panel   • Lipase   • hCG, Serum, Qualitative   • CBC Auto Differential   • Urinalysis With Microscopic If Indicated (No Culture) - Urine, Clean Catch   • CBC & Differential         Additional sources:  - Discussed/obtained information from independent historians: None  Available     additional information was obtained to confirm the patient's history.    - External (non-ED) record review: Reviewed in immediate care note from 3/23/2023 recorded by a Dr Gonzalez.  Patient was seen for congestion cough fatigue and sore throat.  Patient was tested for COVID-19 and diagnosed with acute URI and discharged  home with conservative measures.    - Chronic or social conditions impacting care: None      Differential diagnosis:    DDx: Early acute appendicitis, pancreatitis, Crohn's exacerbation, Crohn's exacerbation with abscess, exacerbation with perforation, SBO/outlet obstruction, ovarian cyst, ectopic pregnancy, tubo-ovarian abscess, ovarian torsion, cystitis, ureteral stone.  Doubt a pelvic problem given exam and physical exam.  We will go ahead and initiate evaluation by obtaining CBC, CMP, lipase, UA, hCG, CT abdomen pelvis with IV contrast.          Independent interpretation of labs, radiology studies, and discussions with consultants:  ED Course as of 02/16/23 0708   Sun Feb 12, 2023   0510 WBC: 6.33 [RC]   0510 RBC: 4.23 [RC]   0510 Hemoglobin: 12.3 [RC]   0510 Hematocrit: 38.1 [RC]   0510 Platelets: 233 [RC]   0510 Lipase(!): 61 [RC]   0626 CT abdomen pelvis is pending at this time.  Patient care turned over to Dr. Bunn.  Disposition pending pain control and complete work-up. [RC]      ED Course User Index  [RC] Stephen Scott III, PA                  PPE: The patient wore a mask throughout the entire encounter. I wore a well-fitting mask.    DIAGNOSIS  Final diagnoses:   Generalized abdominal pain         DISPOSITION  Pending at turnover      Latest Documented Vital Signs:  As of 07:08 EST  BP- 112/79 HR- 63 Temp- 98.4 °F (36.9 °C) (Tympanic) O2 sat- 97%      --    Please note that portions of this were completed with a voice recognition program.       Note Disclaimer: At [facility], we believe that sharing information builds trust and better relationships. You are receiving this note because you are receiving care at [facility] or recently visited. It is possible you will see health information before a provider has talked with you about it. This kind of information can be easy to misunderstand. To help you fully understand what it means for your health, we urge you to discuss this note with  your provider.       Stephen Scott III, PA  02/16/23 0709

## 2023-02-12 NOTE — ED NOTES
Pt ambulatory to triage from home with c/o crohn's flare - also states nausea and diarrhea.  Symptoms x 24 hours.  Pt wearing mask in triage. Triage personnel wore appropriate PPE

## 2023-02-12 NOTE — DISCHARGE INSTRUCTIONS
Call  later today to discuss further management.  Return to the emergency department for fever, worsening symptoms, symptoms not controlled with pain medication described, fever, or should have any further concerns.

## 2023-02-12 NOTE — ED PROVIDER NOTES
MD ATTESTATION NOTE    The ZOLTAN and I have discussed this patient's history, physical exam, and treatment plan.  I have reviewed the documentation and personally had a face to face interaction with the patient. I affirm the documentation and agree with the treatment and plan.  The attached note describes my personal findings.      I provided a substantive portion of the care of the patient.  I personally performed the physical exam in its entirety, and below are my findings.  For this patient encounter, the patient wore surgical mask, I wore full protective PPE including N95 and eye protection.      Brief HPI: This patient is a 32-year-old female presenting to the emergency room today with generalized abdominal discomfort that began yesterday evening.  She does report some associated nausea but denies bloody stools or diarrhea.  She does have a past medical history of Crohn's disease and does take Bentyl as well as Stelara.      PHYSICAL EXAM  ED Triage Vitals [02/12/23 0325]   Temp Heart Rate Resp BP SpO2   98.4 °F (36.9 °C) 87 16 115/85 99 %      Temp src Heart Rate Source Patient Position BP Location FiO2 (%)   Tympanic Monitor Standing Right arm --         GENERAL: In mild distress due to pain, nontoxic in appearance  HENT: nares patent  EYES: no scleral icterus  CV: regular rhythm, normal rate, no M/R/G  RESPIRATORY: normal effort, lungs clear bilaterally  ABDOMEN: soft, mild tenderness to palpation present to the left upper quadrant as well as the left lower quadrant.  No rebound or guarding noted  MUSCULOSKELETAL: no deformity, no edema  NEURO: alert, moves all extremities, follows commands  PSYCH:  calm, cooperative  SKIN: warm, dry    Vital signs and nursing notes reviewed.        Plan: We will treat the patient's discomfort and nausea as we obtain labs as well as a CT scan of the abdomen and pelvis.  We will monitor and reassess following       Curt Bunn MD  02/12/23 0504

## 2023-02-12 NOTE — ED NOTES
Awaiting CMP results from lab, called chemistry and spoke to Sarah who states results will be released ASAP.

## 2023-03-30 ENCOUNTER — OFFICE (AMBULATORY)
Dept: URBAN - METROPOLITAN AREA CLINIC 94 | Facility: CLINIC | Age: 33
End: 2023-03-30

## 2023-03-30 VITALS
DIASTOLIC BLOOD PRESSURE: 77 MMHG | HEART RATE: 88 BPM | SYSTOLIC BLOOD PRESSURE: 121 MMHG | HEIGHT: 69 IN | WEIGHT: 185 LBS | OXYGEN SATURATION: 99 %

## 2023-03-30 DIAGNOSIS — K62.5 HEMORRHAGE OF ANUS AND RECTUM: ICD-10-CM

## 2023-03-30 DIAGNOSIS — Z92.25 PERSONAL HISTORY OF IMMUNOSUPPRESSION THERAPY: ICD-10-CM

## 2023-03-30 DIAGNOSIS — K50.013 CROHN'S DISEASE OF SMALL INTESTINE WITH FISTULA: ICD-10-CM

## 2023-03-30 PROCEDURE — 99214 OFFICE O/P EST MOD 30 MIN: CPT | Performed by: INTERNAL MEDICINE

## 2023-04-11 ENCOUNTER — TRANSCRIBE ORDERS (OUTPATIENT)
Dept: ADMINISTRATIVE | Facility: HOSPITAL | Age: 33
End: 2023-04-11
Payer: COMMERCIAL

## 2023-04-11 ENCOUNTER — HOSPITAL ENCOUNTER (OUTPATIENT)
Dept: GENERAL RADIOLOGY | Facility: HOSPITAL | Age: 33
Discharge: HOME | End: 2023-04-11
Payer: COMMERCIAL

## 2023-04-11 ENCOUNTER — LAB (OUTPATIENT)
Dept: LAB | Facility: HOSPITAL | Age: 33
End: 2023-04-11
Payer: COMMERCIAL

## 2023-04-11 DIAGNOSIS — Z92.25 HISTORY OF IMMUNOSUPPRESSIVE THERAPY: ICD-10-CM

## 2023-04-11 DIAGNOSIS — E53.8 VITAMIN B12 DEFICIENCY: ICD-10-CM

## 2023-04-11 DIAGNOSIS — K62.5 BRBPR (BRIGHT RED BLOOD PER RECTUM): ICD-10-CM

## 2023-04-11 DIAGNOSIS — K50.013 CROHN'S DISEASE OF SMALL INTESTINE WITH FISTULA: ICD-10-CM

## 2023-04-11 DIAGNOSIS — K50.013 CROHN'S DISEASE OF SMALL INTESTINE WITH FISTULA: Primary | ICD-10-CM

## 2023-04-11 DIAGNOSIS — D50.9 IRON DEFICIENCY ANEMIA, UNSPECIFIED IRON DEFICIENCY ANEMIA TYPE: ICD-10-CM

## 2023-04-11 DIAGNOSIS — E03.8 SUBCLINICAL HYPOTHYROIDISM: ICD-10-CM

## 2023-04-11 LAB
BASOPHILS # BLD AUTO: 0.05 10*3/MM3 (ref 0–0.2)
BASOPHILS NFR BLD AUTO: 1 % (ref 0–1.5)
DEPRECATED RDW RBC AUTO: 41 FL (ref 37–54)
EOSINOPHIL # BLD AUTO: 0.07 10*3/MM3 (ref 0–0.4)
EOSINOPHIL NFR BLD AUTO: 1.4 % (ref 0.3–6.2)
ERYTHROCYTE [DISTWIDTH] IN BLOOD BY AUTOMATED COUNT: 12.8 % (ref 12.3–15.4)
FERRITIN SERPL-MCNC: 19.5 NG/ML (ref 13–150)
FOLATE SERPL-MCNC: >20 NG/ML (ref 4.78–24.2)
HBV SURFACE AB SER RIA-ACNC: REACTIVE
HBV SURFACE AG SERPL QL IA: NORMAL
HCT VFR BLD AUTO: 42.1 % (ref 34–46.6)
HCV AB SER DONR QL: NORMAL
HGB BLD-MCNC: 13.8 G/DL (ref 12–15.9)
IMM GRANULOCYTES # BLD AUTO: 0.01 10*3/MM3 (ref 0–0.05)
IMM GRANULOCYTES NFR BLD AUTO: 0.2 % (ref 0–0.5)
IRON 24H UR-MRATE: 62 MCG/DL (ref 37–145)
IRON SATN MFR SERPL: 12 % (ref 20–50)
LYMPHOCYTES # BLD AUTO: 1.23 10*3/MM3 (ref 0.7–3.1)
LYMPHOCYTES NFR BLD AUTO: 24.7 % (ref 19.6–45.3)
MCH RBC QN AUTO: 28.8 PG (ref 26.6–33)
MCHC RBC AUTO-ENTMCNC: 32.8 G/DL (ref 31.5–35.7)
MCV RBC AUTO: 87.7 FL (ref 79–97)
MONOCYTES # BLD AUTO: 0.27 10*3/MM3 (ref 0.1–0.9)
MONOCYTES NFR BLD AUTO: 5.4 % (ref 5–12)
NEUTROPHILS NFR BLD AUTO: 3.34 10*3/MM3 (ref 1.7–7)
NEUTROPHILS NFR BLD AUTO: 67.3 % (ref 42.7–76)
NRBC BLD AUTO-RTO: 0 /100 WBC (ref 0–0.2)
PLATELET # BLD AUTO: 280 10*3/MM3 (ref 140–450)
PMV BLD AUTO: 10.2 FL (ref 6–12)
RBC # BLD AUTO: 4.8 10*6/MM3 (ref 3.77–5.28)
T4 FREE SERPL-MCNC: 0.83 NG/DL (ref 0.93–1.7)
TIBC SERPL-MCNC: 530 MCG/DL (ref 298–536)
TRANSFERRIN SERPL-MCNC: 356 MG/DL (ref 200–360)
TSH SERPL DL<=0.05 MIU/L-ACNC: 5.86 UIU/ML (ref 0.27–4.2)
VIT B12 BLD-MCNC: 543 PG/ML (ref 211–946)
WBC NRBC COR # BLD: 4.97 10*3/MM3 (ref 3.4–10.8)

## 2023-04-11 PROCEDURE — 83540 ASSAY OF IRON: CPT

## 2023-04-11 PROCEDURE — 85025 COMPLETE CBC W/AUTO DIFF WBC: CPT

## 2023-04-11 PROCEDURE — 83520 IMMUNOASSAY QUANT NOS NONAB: CPT

## 2023-04-11 PROCEDURE — 82607 VITAMIN B-12: CPT

## 2023-04-11 PROCEDURE — 82746 ASSAY OF FOLIC ACID SERUM: CPT

## 2023-04-11 PROCEDURE — 36415 COLL VENOUS BLD VENIPUNCTURE: CPT

## 2023-04-11 PROCEDURE — 86803 HEPATITIS C AB TEST: CPT

## 2023-04-11 PROCEDURE — 86141 C-REACTIVE PROTEIN HS: CPT

## 2023-04-11 PROCEDURE — 84443 ASSAY THYROID STIM HORMONE: CPT

## 2023-04-11 PROCEDURE — 82728 ASSAY OF FERRITIN: CPT

## 2023-04-11 PROCEDURE — 71046 X-RAY EXAM CHEST 2 VIEWS: CPT

## 2023-04-11 PROCEDURE — 84439 ASSAY OF FREE THYROXINE: CPT

## 2023-04-11 PROCEDURE — 84466 ASSAY OF TRANSFERRIN: CPT

## 2023-04-11 PROCEDURE — 86480 TB TEST CELL IMMUN MEASURE: CPT

## 2023-04-11 PROCEDURE — 86704 HEP B CORE ANTIBODY TOTAL: CPT

## 2023-04-11 PROCEDURE — 82397 CHEMILUMINESCENT ASSAY: CPT

## 2023-04-11 PROCEDURE — 87340 HEPATITIS B SURFACE AG IA: CPT

## 2023-04-11 PROCEDURE — 86706 HEP B SURFACE ANTIBODY: CPT

## 2023-04-12 ENCOUNTER — TELEPHONE (OUTPATIENT)
Dept: INTERNAL MEDICINE | Facility: CLINIC | Age: 33
End: 2023-04-12
Payer: COMMERCIAL

## 2023-04-12 LAB — HBV CORE AB SERPL QL IA: NEGATIVE

## 2023-04-12 RX ORDER — LEVOTHYROXINE SODIUM 0.03 MG/1
25 TABLET ORAL DAILY
Qty: 90 TABLET | Refills: 1 | Status: SHIPPED | OUTPATIENT
Start: 2023-04-12 | End: 2023-07-12

## 2023-04-12 RX ORDER — LEVOTHYROXINE SODIUM 0.03 MG/1
25 TABLET ORAL DAILY
Qty: 90 TABLET | Refills: 1 | Status: SHIPPED | OUTPATIENT
Start: 2023-04-12 | End: 2023-04-12

## 2023-04-12 NOTE — TELEPHONE ENCOUNTER
----- Message from Doreen Stephenson sent at 4/12/2023  7:06 AM EDT -----  Regarding: Labs  Contact: [phone]  I had my labs done yesterday and noticed that my thyroid level is higher than last year. Does this warrant starting medication for it? If so I'd like to go ahead and get started since my appointment with you is two weeks from now. I use the [facility] pharmacy.

## 2023-04-12 NOTE — TELEPHONE ENCOUNTER
I have left pt a detailed message of this. And I have sent medication to [facility]

## 2023-04-13 LAB
GAMMA INTERFERON BACKGROUND BLD IA-ACNC: 0.02 IU/ML
M TB IFN-G BLD-IMP: NEGATIVE
M TB IFN-G CD4+ T-CELLS BLD-ACNC: 0.03 IU/ML
M TBIFN-G CD4+ CD8+T-CELLS BLD-ACNC: 0.03 IU/ML
MITOGEN IGNF BLD-ACNC: >10 IU/ML
QUANTIFERON INCUBATION: NORMAL
SERVICE CMNT-IMP: NORMAL

## 2023-04-17 LAB — REF LAB TEST METHOD: NORMAL

## 2023-05-25 ENCOUNTER — LAB (OUTPATIENT)
Dept: LAB | Facility: HOSPITAL | Age: 33
End: 2023-05-25
Payer: COMMERCIAL

## 2023-05-25 DIAGNOSIS — E03.8 SUBCLINICAL HYPOTHYROIDISM: Primary | ICD-10-CM

## 2023-05-25 DIAGNOSIS — K50.013 CROHN'S DISEASE OF SMALL INTESTINE WITH FISTULA: Primary | ICD-10-CM

## 2023-05-25 DIAGNOSIS — E03.4 HYPOTHYROIDISM DUE TO ACQUIRED ATROPHY OF THYROID: ICD-10-CM

## 2023-05-25 PROCEDURE — 80299 QUANTITATIVE ASSAY DRUG: CPT

## 2023-05-25 PROCEDURE — 82542 COL CHROMOTOGRAPHY QUAL/QUAN: CPT

## 2023-05-25 PROCEDURE — 36415 COLL VENOUS BLD VENIPUNCTURE: CPT

## 2023-05-31 LAB
REF LAB TEST METHOD: NORMAL
REFRIGERATED SORT: NORMAL

## 2023-07-05 PROBLEM — E03.4 HYPOTHYROIDISM DUE TO ACQUIRED ATROPHY OF THYROID: Status: ACTIVE | Noted: 2023-07-05

## 2023-07-21 ENCOUNTER — LAB (OUTPATIENT)
Dept: LAB | Facility: HOSPITAL | Age: 33
End: 2023-07-21
Payer: COMMERCIAL

## 2023-07-21 DIAGNOSIS — E03.4 HYPOTHYROIDISM DUE TO ACQUIRED ATROPHY OF THYROID: ICD-10-CM

## 2023-07-21 DIAGNOSIS — Z00.00 WELL ADULT EXAM: ICD-10-CM

## 2023-07-21 DIAGNOSIS — E55.9 VITAMIN D DEFICIENCY: ICD-10-CM

## 2023-07-21 LAB
25(OH)D3 SERPL-MCNC: 17.2 NG/ML (ref 30–100)
ALBUMIN SERPL-MCNC: 4.2 G/DL (ref 3.5–5.2)
ALBUMIN/GLOB SERPL: 1.6 G/DL
ALP SERPL-CCNC: 53 U/L (ref 39–117)
ALT SERPL W P-5'-P-CCNC: 19 U/L (ref 1–33)
ANION GAP SERPL CALCULATED.3IONS-SCNC: 7.8 MMOL/L (ref 5–15)
AST SERPL-CCNC: 22 U/L (ref 1–32)
BILIRUB SERPL-MCNC: 0.4 MG/DL (ref 0–1.2)
BUN SERPL-MCNC: 14 MG/DL (ref 6–20)
BUN/CREAT SERPL: 18.7 (ref 7–25)
CALCIUM SPEC-SCNC: 9 MG/DL (ref 8.6–10.5)
CHLORIDE SERPL-SCNC: 106 MMOL/L (ref 98–107)
CHOLEST SERPL-MCNC: 167 MG/DL (ref 0–200)
CO2 SERPL-SCNC: 27.2 MMOL/L (ref 22–29)
CREAT SERPL-MCNC: 0.75 MG/DL (ref 0.57–1)
EGFRCR SERPLBLD CKD-EPI 2021: 107.3 ML/MIN/1.73
GLOBULIN UR ELPH-MCNC: 2.6 GM/DL
GLUCOSE SERPL-MCNC: 80 MG/DL (ref 65–99)
HDLC SERPL-MCNC: 87 MG/DL (ref 40–60)
LDLC SERPL CALC-MCNC: 59 MG/DL (ref 0–100)
LDLC/HDLC SERPL: 0.64 {RATIO}
POTASSIUM SERPL-SCNC: 3.4 MMOL/L (ref 3.5–5.2)
PROT SERPL-MCNC: 6.8 G/DL (ref 6–8.5)
SODIUM SERPL-SCNC: 141 MMOL/L (ref 136–145)
T4 FREE SERPL-MCNC: 1.13 NG/DL (ref 0.93–1.7)
TRIGL SERPL-MCNC: 123 MG/DL (ref 0–150)
TSH SERPL DL<=0.05 MIU/L-ACNC: 3.79 UIU/ML (ref 0.27–4.2)
VLDLC SERPL-MCNC: 21 MG/DL (ref 5–40)

## 2023-07-21 PROCEDURE — 84439 ASSAY OF FREE THYROXINE: CPT

## 2023-07-21 PROCEDURE — 84443 ASSAY THYROID STIM HORMONE: CPT

## 2023-07-21 PROCEDURE — 36415 COLL VENOUS BLD VENIPUNCTURE: CPT

## 2023-07-21 PROCEDURE — 80061 LIPID PANEL: CPT

## 2023-07-21 PROCEDURE — 80053 COMPREHEN METABOLIC PANEL: CPT

## 2023-07-21 PROCEDURE — 82306 VITAMIN D 25 HYDROXY: CPT

## 2023-08-08 ENCOUNTER — OFFICE (AMBULATORY)
Dept: URBAN - METROPOLITAN AREA INFUSION 5 | Facility: INFUSION | Age: 33
End: 2023-08-08
Payer: COMMERCIAL

## 2023-08-08 VITALS
SYSTOLIC BLOOD PRESSURE: 108 MMHG | HEART RATE: 63 BPM | SYSTOLIC BLOOD PRESSURE: 114 MMHG | DIASTOLIC BLOOD PRESSURE: 80 MMHG | DIASTOLIC BLOOD PRESSURE: 79 MMHG | HEART RATE: 74 BPM | TEMPERATURE: 98.4 F | TEMPERATURE: 98.3 F | HEART RATE: 60 BPM | DIASTOLIC BLOOD PRESSURE: 84 MMHG | DIASTOLIC BLOOD PRESSURE: 88 MMHG | SYSTOLIC BLOOD PRESSURE: 117 MMHG | SYSTOLIC BLOOD PRESSURE: 109 MMHG | SYSTOLIC BLOOD PRESSURE: 122 MMHG | DIASTOLIC BLOOD PRESSURE: 77 MMHG | RESPIRATION RATE: 16 BRPM | WEIGHT: 190 LBS | DIASTOLIC BLOOD PRESSURE: 72 MMHG | HEART RATE: 65 BPM | HEART RATE: 84 BPM | HEART RATE: 57 BPM | HEART RATE: 61 BPM | TEMPERATURE: 97.2 F | SYSTOLIC BLOOD PRESSURE: 123 MMHG | SYSTOLIC BLOOD PRESSURE: 126 MMHG | HEART RATE: 71 BPM | HEIGHT: 69 IN

## 2023-08-08 DIAGNOSIS — K50.013 CROHN'S DISEASE OF SMALL INTESTINE WITH FISTULA: ICD-10-CM

## 2023-08-08 PROCEDURE — 96415 CHEMO IV INFUSION ADDL HR: CPT | Performed by: INTERNAL MEDICINE

## 2023-08-08 PROCEDURE — 96413 CHEMO IV INFUSION 1 HR: CPT | Performed by: INTERNAL MEDICINE

## 2023-08-08 NOTE — SERVICENOTES
NEGATIVE PPD or Quantiferon Gold: 4/2023
MEDS PROVIDED BY [facility] cbc,cmp,crp drawn today prior to infusion

## 2023-08-22 ENCOUNTER — OFFICE (AMBULATORY)
Dept: URBAN - METROPOLITAN AREA INFUSION 5 | Facility: INFUSION | Age: 33
End: 2023-08-22

## 2023-08-22 VITALS
RESPIRATION RATE: 16 BRPM | SYSTOLIC BLOOD PRESSURE: 115 MMHG | TEMPERATURE: 98.4 F | HEART RATE: 66 BPM | HEART RATE: 68 BPM | DIASTOLIC BLOOD PRESSURE: 77 MMHG | TEMPERATURE: 97.8 F | SYSTOLIC BLOOD PRESSURE: 116 MMHG | SYSTOLIC BLOOD PRESSURE: 109 MMHG | SYSTOLIC BLOOD PRESSURE: 100 MMHG | HEIGHT: 69 IN | SYSTOLIC BLOOD PRESSURE: 104 MMHG | DIASTOLIC BLOOD PRESSURE: 69 MMHG | TEMPERATURE: 98.1 F | DIASTOLIC BLOOD PRESSURE: 81 MMHG | SYSTOLIC BLOOD PRESSURE: 99 MMHG | HEART RATE: 64 BPM | DIASTOLIC BLOOD PRESSURE: 64 MMHG | SYSTOLIC BLOOD PRESSURE: 117 MMHG | HEART RATE: 58 BPM | DIASTOLIC BLOOD PRESSURE: 70 MMHG | DIASTOLIC BLOOD PRESSURE: 80 MMHG | HEART RATE: 67 BPM | SYSTOLIC BLOOD PRESSURE: 103 MMHG | DIASTOLIC BLOOD PRESSURE: 75 MMHG | WEIGHT: 197 LBS | SYSTOLIC BLOOD PRESSURE: 110 MMHG | HEART RATE: 69 BPM | HEART RATE: 65 BPM | DIASTOLIC BLOOD PRESSURE: 68 MMHG | HEART RATE: 62 BPM

## 2023-08-22 DIAGNOSIS — K50.013 CROHN'S DISEASE OF SMALL INTESTINE WITH FISTULA: ICD-10-CM

## 2023-08-22 PROCEDURE — 96413 CHEMO IV INFUSION 1 HR: CPT | Performed by: INTERNAL MEDICINE

## 2023-08-22 PROCEDURE — 96415 CHEMO IV INFUSION ADDL HR: CPT | Performed by: INTERNAL MEDICINE

## 2023-08-22 NOTE — SERVICENOTES
NEGATIVE PPD or Quantiferon Gold: 4/2023
MEDS PROVIDED BY [facility] cbc,cmp,crp drawn today prior to infusion

## 2023-08-31 VITALS
SYSTOLIC BLOOD PRESSURE: 109 MMHG | RESPIRATION RATE: 14 BRPM | HEART RATE: 82 BPM | SYSTOLIC BLOOD PRESSURE: 103 MMHG | SYSTOLIC BLOOD PRESSURE: 135 MMHG | HEART RATE: 86 BPM | HEART RATE: 68 BPM | HEART RATE: 69 BPM | SYSTOLIC BLOOD PRESSURE: 129 MMHG | SYSTOLIC BLOOD PRESSURE: 118 MMHG | DIASTOLIC BLOOD PRESSURE: 78 MMHG | RESPIRATION RATE: 9 BRPM | DIASTOLIC BLOOD PRESSURE: 84 MMHG | DIASTOLIC BLOOD PRESSURE: 68 MMHG | TEMPERATURE: 96.8 F | SYSTOLIC BLOOD PRESSURE: 117 MMHG | HEART RATE: 73 BPM | RESPIRATION RATE: 8 BRPM | HEART RATE: 74 BPM | SYSTOLIC BLOOD PRESSURE: 104 MMHG | RESPIRATION RATE: 16 BRPM | DIASTOLIC BLOOD PRESSURE: 76 MMHG | TEMPERATURE: 97 F | DIASTOLIC BLOOD PRESSURE: 63 MMHG | OXYGEN SATURATION: 98 % | HEART RATE: 71 BPM | OXYGEN SATURATION: 99 % | SYSTOLIC BLOOD PRESSURE: 101 MMHG | DIASTOLIC BLOOD PRESSURE: 73 MMHG | HEART RATE: 67 BPM | RESPIRATION RATE: 18 BRPM | HEART RATE: 81 BPM | DIASTOLIC BLOOD PRESSURE: 60 MMHG | HEART RATE: 79 BPM | HEART RATE: 54 BPM | DIASTOLIC BLOOD PRESSURE: 83 MMHG | RESPIRATION RATE: 15 BRPM | RESPIRATION RATE: 20 BRPM | WEIGHT: 185 LBS | OXYGEN SATURATION: 100 % | SYSTOLIC BLOOD PRESSURE: 128 MMHG | SYSTOLIC BLOOD PRESSURE: 110 MMHG | DIASTOLIC BLOOD PRESSURE: 69 MMHG | DIASTOLIC BLOOD PRESSURE: 65 MMHG | DIASTOLIC BLOOD PRESSURE: 80 MMHG | HEART RATE: 70 BPM | HEIGHT: 69 IN

## 2023-09-06 ENCOUNTER — OFFICE (AMBULATORY)
Dept: URBAN - METROPOLITAN AREA PATHOLOGY 4 | Facility: PATHOLOGY | Age: 33
End: 2023-09-06

## 2023-09-06 ENCOUNTER — AMBULATORY SURGICAL CENTER (AMBULATORY)
Dept: URBAN - METROPOLITAN AREA SURGERY 17 | Facility: SURGERY | Age: 33
End: 2023-09-06

## 2023-09-06 DIAGNOSIS — K63.3 ULCER OF INTESTINE: ICD-10-CM

## 2023-09-06 DIAGNOSIS — K50.013 CROHN'S DISEASE OF SMALL INTESTINE WITH FISTULA: ICD-10-CM

## 2023-09-06 DIAGNOSIS — K63.89 OTHER SPECIFIED DISEASES OF INTESTINE: ICD-10-CM

## 2023-09-06 LAB
GI HISTOLOGY: A. UNSPECIFIED: (no result)
GI HISTOLOGY: B. UNSPECIFIED: (no result)
GI HISTOLOGY: C. UNSPECIFIED: (no result)
GI HISTOLOGY: D. UNSPECIFIED: (no result)
GI HISTOLOGY: E. UNSPECIFIED: (no result)
GI HISTOLOGY: F. UNSPECIFIED: (no result)
GI HISTOLOGY: G. UNSPECIFIED: (no result)
GI HISTOLOGY: PDF REPORT: (no result)

## 2023-09-06 PROCEDURE — 88305 TISSUE EXAM BY PATHOLOGIST: CPT | Performed by: INTERNAL MEDICINE

## 2023-09-06 PROCEDURE — 45380 COLONOSCOPY AND BIOPSY: CPT | Performed by: INTERNAL MEDICINE

## 2023-09-07 ENCOUNTER — OFFICE VISIT (OUTPATIENT)
Dept: ORTHOPEDIC SURGERY | Facility: CLINIC | Age: 33
End: 2023-09-07
Payer: COMMERCIAL

## 2023-09-07 VITALS — TEMPERATURE: 97.1 F | WEIGHT: 185 LBS | BODY MASS INDEX: 27.4 KG/M2 | HEIGHT: 69 IN

## 2023-09-07 DIAGNOSIS — S16.1XXA STRAIN OF CERVICAL PORTION OF RIGHT TRAPEZIUS MUSCLE: ICD-10-CM

## 2023-09-07 DIAGNOSIS — M54.2 NECK PAIN: Primary | ICD-10-CM

## 2023-09-07 RX ORDER — CYCLOBENZAPRINE HCL 5 MG
5 TABLET ORAL NIGHTLY PRN
Qty: 30 TABLET | Refills: 0 | Status: SHIPPED | OUTPATIENT
Start: 2023-09-07

## 2023-09-07 RX ORDER — PREDNISONE 20 MG/1
20 TABLET ORAL DAILY
Qty: 3 TABLET | Refills: 0 | Status: SHIPPED | OUTPATIENT
Start: 2023-09-07
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
## 2023-09-21 ENCOUNTER — OFFICE (AMBULATORY)
Dept: URBAN - METROPOLITAN AREA INFUSION 5 | Facility: INFUSION | Age: 33
End: 2023-09-21
Payer: COMMERCIAL

## 2023-09-21 DIAGNOSIS — K50.013 CROHN'S DISEASE OF SMALL INTESTINE WITH FISTULA: ICD-10-CM

## 2023-09-21 PROCEDURE — 96415 CHEMO IV INFUSION ADDL HR: CPT | Performed by: INTERNAL MEDICINE

## 2023-09-21 PROCEDURE — 96413 CHEMO IV INFUSION 1 HR: CPT | Performed by: INTERNAL MEDICINE

## 2023-10-03 RX ORDER — LEVOTHYROXINE SODIUM 0.03 MG/1
25 TABLET ORAL DAILY
Qty: 90 TABLET | Refills: 1 | Status: SHIPPED | OUTPATIENT
Start: 2023-10-03

## 2023-10-04 RX ORDER — ARIPIPRAZOLE 2 MG/1
2 TABLET ORAL DAILY
Qty: 90 TABLET | Refills: 1 | Status: SHIPPED | OUTPATIENT
Start: 2023-10-04 | End: 2023-10-09 | Stop reason: SDUPTHER

## 2023-10-04 RX ORDER — VENLAFAXINE HYDROCHLORIDE 75 MG/1
225 CAPSULE, EXTENDED RELEASE ORAL DAILY
Qty: 270 CAPSULE | Refills: 1 | Status: SHIPPED | OUTPATIENT
Start: 2023-10-04 | End: 2023-10-09 | Stop reason: SDUPTHER

## 2023-10-09 ENCOUNTER — TELEPHONE (OUTPATIENT)
Dept: INTERNAL MEDICINE | Facility: CLINIC | Age: 33
End: 2023-10-09
Payer: COMMERCIAL

## 2023-10-09 DIAGNOSIS — F33.41 RECURRENT MAJOR DEPRESSION IN PARTIAL REMISSION: Primary | ICD-10-CM

## 2023-10-09 RX ORDER — VENLAFAXINE HYDROCHLORIDE 75 MG/1
225 CAPSULE, EXTENDED RELEASE ORAL DAILY
Qty: 270 CAPSULE | Refills: 1 | Status: SHIPPED | OUTPATIENT
Start: 2023-10-09

## 2023-10-09 RX ORDER — ARIPIPRAZOLE 2 MG/1
2 TABLET ORAL DAILY
Qty: 90 TABLET | Refills: 1 | Status: SHIPPED | OUTPATIENT
Start: 2023-10-09

## 2023-10-09 NOTE — TELEPHONE ENCOUNTER
Pt requested ARIPiprazole Abilify 2MG and venlafaxine XR (Effexor-XR) 75MG to be sent to [facility] pharmacy. Prescription sent.

## 2023-10-09 NOTE — TELEPHONE ENCOUNTER
----- Message from Doreen Stephenson sent at 10/6/2023 10:26 AM EDT -----  Regarding: Rx to different pharmacy  Contact: [phone]  Can you please send the Effexor and Ability to the [facility] pharmacy?

## 2023-10-30 DIAGNOSIS — F33.41 RECURRENT MAJOR DEPRESSION IN PARTIAL REMISSION: ICD-10-CM

## 2023-11-16 ENCOUNTER — OFFICE (AMBULATORY)
Dept: URBAN - METROPOLITAN AREA INFUSION 5 | Facility: INFUSION | Age: 33
End: 2023-11-16
Payer: COMMERCIAL

## 2023-11-16 VITALS
HEART RATE: 71 BPM | HEART RATE: 67 BPM | SYSTOLIC BLOOD PRESSURE: 102 MMHG | HEART RATE: 74 BPM | WEIGHT: 203 LBS | DIASTOLIC BLOOD PRESSURE: 58 MMHG | HEART RATE: 77 BPM | SYSTOLIC BLOOD PRESSURE: 114 MMHG | RESPIRATION RATE: 16 BRPM | TEMPERATURE: 98 F | SYSTOLIC BLOOD PRESSURE: 103 MMHG | DIASTOLIC BLOOD PRESSURE: 61 MMHG | DIASTOLIC BLOOD PRESSURE: 70 MMHG | DIASTOLIC BLOOD PRESSURE: 77 MMHG | HEIGHT: 69 IN | DIASTOLIC BLOOD PRESSURE: 78 MMHG | HEART RATE: 73 BPM | HEART RATE: 75 BPM | TEMPERATURE: 97.8 F | DIASTOLIC BLOOD PRESSURE: 66 MMHG | SYSTOLIC BLOOD PRESSURE: 113 MMHG | DIASTOLIC BLOOD PRESSURE: 63 MMHG | SYSTOLIC BLOOD PRESSURE: 99 MMHG | SYSTOLIC BLOOD PRESSURE: 100 MMHG | DIASTOLIC BLOOD PRESSURE: 76 MMHG

## 2023-11-16 DIAGNOSIS — K50.013 CROHN'S DISEASE OF SMALL INTESTINE WITH FISTULA: ICD-10-CM

## 2023-11-16 PROCEDURE — 96413 CHEMO IV INFUSION 1 HR: CPT | Performed by: INTERNAL MEDICINE

## 2023-11-16 PROCEDURE — 96415 CHEMO IV INFUSION ADDL HR: CPT | Performed by: INTERNAL MEDICINE

## 2023-11-16 NOTE — SERVICENOTES
NEGATIVE PPD or Quantiferon Gold: 4/2023
MEDS PROVIDED BY [facility] no labs drawn today

## 2023-12-18 RX ORDER — BUPROPION HYDROCHLORIDE 150 MG/1
150 TABLET ORAL DAILY
Qty: 30 TABLET | Refills: 1 | Status: SHIPPED | OUTPATIENT
Start: 2023-12-18

## 2024-01-17 ENCOUNTER — PATIENT MESSAGE (OUTPATIENT)
Dept: INTERNAL MEDICINE | Facility: CLINIC | Age: 34
End: 2024-01-17
Payer: COMMERCIAL

## 2024-01-17 RX ORDER — BUPROPION HYDROCHLORIDE 150 MG/1
150 TABLET ORAL DAILY
Qty: 30 TABLET | Refills: 1 | Status: SHIPPED | OUTPATIENT
Start: 2024-01-17 | End: 2024-01-18 | Stop reason: SDUPTHER

## 2024-01-18 ENCOUNTER — OFFICE (AMBULATORY)
Dept: URBAN - METROPOLITAN AREA INFUSION 5 | Facility: INFUSION | Age: 34
End: 2024-01-18
Payer: COMMERCIAL

## 2024-01-18 VITALS
SYSTOLIC BLOOD PRESSURE: 133 MMHG | DIASTOLIC BLOOD PRESSURE: 86 MMHG | DIASTOLIC BLOOD PRESSURE: 63 MMHG | SYSTOLIC BLOOD PRESSURE: 107 MMHG | HEART RATE: 75 BPM | TEMPERATURE: 98.6 F | SYSTOLIC BLOOD PRESSURE: 103 MMHG | SYSTOLIC BLOOD PRESSURE: 101 MMHG | SYSTOLIC BLOOD PRESSURE: 112 MMHG | WEIGHT: 211 LBS | TEMPERATURE: 98.9 F | SYSTOLIC BLOOD PRESSURE: 105 MMHG | SYSTOLIC BLOOD PRESSURE: 116 MMHG | HEIGHT: 69 IN | DIASTOLIC BLOOD PRESSURE: 68 MMHG | DIASTOLIC BLOOD PRESSURE: 75 MMHG | DIASTOLIC BLOOD PRESSURE: 72 MMHG | DIASTOLIC BLOOD PRESSURE: 66 MMHG | HEART RATE: 61 BPM | SYSTOLIC BLOOD PRESSURE: 104 MMHG | DIASTOLIC BLOOD PRESSURE: 69 MMHG | RESPIRATION RATE: 16 BRPM | HEART RATE: 69 BPM | HEART RATE: 72 BPM | HEART RATE: 71 BPM | DIASTOLIC BLOOD PRESSURE: 78 MMHG | HEART RATE: 68 BPM

## 2024-01-18 DIAGNOSIS — K50.013 CROHN'S DISEASE OF SMALL INTESTINE WITH FISTULA: ICD-10-CM

## 2024-01-18 PROCEDURE — 96415 CHEMO IV INFUSION ADDL HR: CPT | Performed by: INTERNAL MEDICINE

## 2024-01-18 PROCEDURE — 96413 CHEMO IV INFUSION 1 HR: CPT | Performed by: INTERNAL MEDICINE

## 2024-01-18 RX ORDER — BUPROPION HYDROCHLORIDE 150 MG/1
150 TABLET ORAL DAILY
Qty: 30 TABLET | Refills: 1 | Status: SHIPPED | OUTPATIENT
Start: 2024-01-18

## 2024-01-18 NOTE — SERVICENOTES
NEGATIVE PPD or Quantiferon Gold: 4/2023
MEDS PROVIDED BY [facility] cbc,cmp,crp drawn today prior to infusion

## 2024-02-14 VITALS
DIASTOLIC BLOOD PRESSURE: 73 MMHG | HEIGHT: 69 IN | HEART RATE: 94 BPM | OXYGEN SATURATION: 97 % | WEIGHT: 200 LBS | SYSTOLIC BLOOD PRESSURE: 130 MMHG

## 2024-02-15 ENCOUNTER — OFFICE (AMBULATORY)
Dept: URBAN - METROPOLITAN AREA CLINIC 94 | Facility: CLINIC | Age: 34
End: 2024-02-15

## 2024-02-15 DIAGNOSIS — R94.5 ABNORMAL RESULTS OF LIVER FUNCTION STUDIES: ICD-10-CM

## 2024-02-15 DIAGNOSIS — Z92.25 PERSONAL HISTORY OF IMMUNOSUPPRESSION THERAPY: ICD-10-CM

## 2024-02-15 DIAGNOSIS — K50.013 CROHN'S DISEASE OF SMALL INTESTINE WITH FISTULA: ICD-10-CM

## 2024-02-15 PROCEDURE — 99214 OFFICE O/P EST MOD 30 MIN: CPT | Performed by: INTERNAL MEDICINE

## 2024-02-19 RX ORDER — BUPROPION HYDROCHLORIDE 300 MG/1
300 TABLET ORAL DAILY
Qty: 90 TABLET | Refills: 1 | Status: SHIPPED | OUTPATIENT
Start: 2024-02-19 | End: 2024-02-20 | Stop reason: SDUPTHER

## 2024-02-20 RX ORDER — BUPROPION HYDROCHLORIDE 300 MG/1
300 TABLET ORAL DAILY
Qty: 90 TABLET | Refills: 1 | Status: SHIPPED | OUTPATIENT
Start: 2024-02-20

## 2024-03-14 ENCOUNTER — OFFICE (AMBULATORY)
Dept: URBAN - METROPOLITAN AREA INFUSION 5 | Facility: INFUSION | Age: 34
End: 2024-03-14
Payer: COMMERCIAL

## 2024-03-14 VITALS
HEIGHT: 69 IN | SYSTOLIC BLOOD PRESSURE: 104 MMHG | DIASTOLIC BLOOD PRESSURE: 69 MMHG | SYSTOLIC BLOOD PRESSURE: 100 MMHG | HEART RATE: 100 BPM | DIASTOLIC BLOOD PRESSURE: 68 MMHG | TEMPERATURE: 98.3 F | SYSTOLIC BLOOD PRESSURE: 98 MMHG | SYSTOLIC BLOOD PRESSURE: 87 MMHG | HEART RATE: 66 BPM | WEIGHT: 200.8 LBS | HEART RATE: 70 BPM | DIASTOLIC BLOOD PRESSURE: 58 MMHG | DIASTOLIC BLOOD PRESSURE: 92 MMHG | DIASTOLIC BLOOD PRESSURE: 84 MMHG | DIASTOLIC BLOOD PRESSURE: 78 MMHG | HEART RATE: 74 BPM | SYSTOLIC BLOOD PRESSURE: 101 MMHG | SYSTOLIC BLOOD PRESSURE: 141 MMHG | SYSTOLIC BLOOD PRESSURE: 110 MMHG | HEART RATE: 72 BPM | TEMPERATURE: 98.7 F | DIASTOLIC BLOOD PRESSURE: 71 MMHG | HEART RATE: 76 BPM | RESPIRATION RATE: 18 BRPM | DIASTOLIC BLOOD PRESSURE: 65 MMHG

## 2024-03-14 DIAGNOSIS — K50.013 CROHN'S DISEASE OF SMALL INTESTINE WITH FISTULA: ICD-10-CM

## 2024-03-14 PROCEDURE — 96413 CHEMO IV INFUSION 1 HR: CPT | Performed by: INTERNAL MEDICINE

## 2024-03-14 PROCEDURE — 96415 CHEMO IV INFUSION ADDL HR: CPT | Performed by: INTERNAL MEDICINE

## 2024-03-14 NOTE — SERVICENOTES
MEDS PROVIDED BY [facility]
NEGATIVE PPD or Quantiferon Gold:4/2023
CBC,CMP,CRP,HFP,ESR,Anser IFX, Montior, IFX, Annual labs obtained today prior to infusion.

## 2024-04-02 DIAGNOSIS — F33.41 RECURRENT MAJOR DEPRESSION IN PARTIAL REMISSION: ICD-10-CM

## 2024-04-02 RX ORDER — LEVOTHYROXINE SODIUM 0.03 MG/1
25 TABLET ORAL DAILY
Qty: 90 TABLET | Refills: 1 | Status: SHIPPED | OUTPATIENT
Start: 2024-04-02

## 2024-04-02 RX ORDER — VENLAFAXINE HYDROCHLORIDE 75 MG/1
225 CAPSULE, EXTENDED RELEASE ORAL DAILY
Qty: 270 CAPSULE | Refills: 1 | Status: SHIPPED | OUTPATIENT
Start: 2024-04-02

## 2024-04-23 ENCOUNTER — OFFICE (AMBULATORY)
Dept: URBAN - METROPOLITAN AREA INFUSION 5 | Facility: INFUSION | Age: 34
End: 2024-04-23
Payer: COMMERCIAL

## 2024-04-23 VITALS
TEMPERATURE: 98.5 F | SYSTOLIC BLOOD PRESSURE: 110 MMHG | DIASTOLIC BLOOD PRESSURE: 83 MMHG | SYSTOLIC BLOOD PRESSURE: 113 MMHG | TEMPERATURE: 98.8 F | TEMPERATURE: 98.9 F | HEART RATE: 70 BPM | HEIGHT: 69 IN | HEART RATE: 64 BPM | HEART RATE: 88 BPM | DIASTOLIC BLOOD PRESSURE: 73 MMHG | SYSTOLIC BLOOD PRESSURE: 98 MMHG | HEART RATE: 60 BPM | SYSTOLIC BLOOD PRESSURE: 121 MMHG | RESPIRATION RATE: 18 BRPM | DIASTOLIC BLOOD PRESSURE: 65 MMHG | DIASTOLIC BLOOD PRESSURE: 81 MMHG

## 2024-04-23 DIAGNOSIS — K50.013 CROHN'S DISEASE OF SMALL INTESTINE WITH FISTULA: ICD-10-CM

## 2024-04-23 PROCEDURE — 96413 CHEMO IV INFUSION 1 HR: CPT | Performed by: INTERNAL MEDICINE

## 2024-04-23 NOTE — SERVICENOTES
MEDS PROVIDED BY [facility]
NEGATIVE PPD or Quantiferon Gold:3/2024
CBC,CMP,CRP obtained today prior to infusion.

## 2024-04-30 NOTE — TELEPHONE ENCOUNTER
Problem: Discharge Planning  Goal: Discharge to home or other facility with appropriate resources  4/30/2024 1151 by Sharon Mart RN  Outcome: Completed  4/29/2024 2238 by Patricia Eugene RN  Outcome: Progressing     Problem: Pain  Goal: Verbalizes/displays adequate comfort level or baseline comfort level  4/30/2024 1151 by Sharon Mart RN  Outcome: Completed  4/29/2024 2238 by Patricia Eugene RN  Outcome: Progressing     Problem: Safety - Adult  Goal: Free from fall injury  4/30/2024 1151 by Sharon Mart, RN  Outcome: Completed  4/29/2024 2238 by Patricia Eugene RN  Outcome: Progressing      Please let patient know prescription was sent to her pharmacy.

## 2024-05-21 ENCOUNTER — OFFICE (AMBULATORY)
Dept: URBAN - METROPOLITAN AREA INFUSION 5 | Facility: INFUSION | Age: 34
End: 2024-05-21
Payer: COMMERCIAL

## 2024-05-21 VITALS
HEIGHT: 69 IN | SYSTOLIC BLOOD PRESSURE: 111 MMHG | SYSTOLIC BLOOD PRESSURE: 120 MMHG | HEART RATE: 75 BPM | HEART RATE: 80 BPM | HEART RATE: 72 BPM | DIASTOLIC BLOOD PRESSURE: 85 MMHG | TEMPERATURE: 98.1 F | DIASTOLIC BLOOD PRESSURE: 82 MMHG | HEART RATE: 76 BPM | DIASTOLIC BLOOD PRESSURE: 88 MMHG | SYSTOLIC BLOOD PRESSURE: 115 MMHG | SYSTOLIC BLOOD PRESSURE: 118 MMHG | TEMPERATURE: 98.7 F | DIASTOLIC BLOOD PRESSURE: 76 MMHG | RESPIRATION RATE: 18 BRPM

## 2024-05-21 DIAGNOSIS — K50.013 CROHN'S DISEASE OF SMALL INTESTINE WITH FISTULA: ICD-10-CM

## 2024-05-21 PROCEDURE — 96413 CHEMO IV INFUSION 1 HR: CPT | Performed by: INTERNAL MEDICINE

## 2024-05-21 NOTE — SERVICENOTES
MEDS PROVIDED BY [facility]
NEGATIVE PPD or Quantiferon Gold:3/2024
CBC,CMP,CRP obtained today prior to infusion.

## 2024-06-18 ENCOUNTER — OFFICE (AMBULATORY)
Dept: URBAN - METROPOLITAN AREA INFUSION 5 | Facility: INFUSION | Age: 34
End: 2024-06-18
Payer: COMMERCIAL

## 2024-06-18 VITALS
DIASTOLIC BLOOD PRESSURE: 79 MMHG | HEART RATE: 85 BPM | HEIGHT: 69 IN | SYSTOLIC BLOOD PRESSURE: 128 MMHG | DIASTOLIC BLOOD PRESSURE: 75 MMHG | WEIGHT: 194 LBS | RESPIRATION RATE: 16 BRPM | HEART RATE: 84 BPM | DIASTOLIC BLOOD PRESSURE: 64 MMHG | SYSTOLIC BLOOD PRESSURE: 132 MMHG | SYSTOLIC BLOOD PRESSURE: 103 MMHG | TEMPERATURE: 98.6 F | HEART RATE: 63 BPM

## 2024-06-18 DIAGNOSIS — K50.013 CROHN'S DISEASE OF SMALL INTESTINE WITH FISTULA: ICD-10-CM

## 2024-06-18 PROCEDURE — 96413 CHEMO IV INFUSION 1 HR: CPT | Performed by: INTERNAL MEDICINE

## 2024-06-18 NOTE — SERVICENOTES
NEGATIVE PPD or Quantiferon Gold:3/2024
MEDS PROVIDED BY [facility]
Lab obtained prior to infusion

## 2024-06-20 VITALS
TEMPERATURE: 97.1 F | SYSTOLIC BLOOD PRESSURE: 100 MMHG | DIASTOLIC BLOOD PRESSURE: 59 MMHG | SYSTOLIC BLOOD PRESSURE: 97 MMHG | HEART RATE: 83 BPM | SYSTOLIC BLOOD PRESSURE: 103 MMHG | SYSTOLIC BLOOD PRESSURE: 138 MMHG | OXYGEN SATURATION: 100 % | SYSTOLIC BLOOD PRESSURE: 128 MMHG | OXYGEN SATURATION: 99 % | HEART RATE: 73 BPM | RESPIRATION RATE: 16 BRPM | SYSTOLIC BLOOD PRESSURE: 119 MMHG | DIASTOLIC BLOOD PRESSURE: 75 MMHG | OXYGEN SATURATION: 98 % | SYSTOLIC BLOOD PRESSURE: 118 MMHG | SYSTOLIC BLOOD PRESSURE: 96 MMHG | DIASTOLIC BLOOD PRESSURE: 60 MMHG | HEART RATE: 84 BPM | WEIGHT: 192 LBS | HEART RATE: 92 BPM | RESPIRATION RATE: 17 BRPM | RESPIRATION RATE: 12 BRPM | HEART RATE: 109 BPM | RESPIRATION RATE: 11 BRPM | DIASTOLIC BLOOD PRESSURE: 88 MMHG | SYSTOLIC BLOOD PRESSURE: 116 MMHG | HEIGHT: 69 IN | DIASTOLIC BLOOD PRESSURE: 80 MMHG | DIASTOLIC BLOOD PRESSURE: 58 MMHG | HEART RATE: 72 BPM | DIASTOLIC BLOOD PRESSURE: 85 MMHG | RESPIRATION RATE: 19 BRPM | HEART RATE: 86 BPM | HEART RATE: 80 BPM

## 2024-06-21 ENCOUNTER — OFFICE (AMBULATORY)
Dept: URBAN - METROPOLITAN AREA PATHOLOGY 4 | Facility: PATHOLOGY | Age: 34
End: 2024-06-21

## 2024-06-21 ENCOUNTER — AMBULATORY SURGICAL CENTER (AMBULATORY)
Dept: URBAN - METROPOLITAN AREA SURGERY 17 | Facility: SURGERY | Age: 34
End: 2024-06-21

## 2024-06-21 DIAGNOSIS — R19.7 DIARRHEA, UNSPECIFIED: ICD-10-CM

## 2024-06-21 DIAGNOSIS — K50.013 CROHN'S DISEASE OF SMALL INTESTINE WITH FISTULA: ICD-10-CM

## 2024-06-21 DIAGNOSIS — K63.89 OTHER SPECIFIED DISEASES OF INTESTINE: ICD-10-CM

## 2024-06-21 DIAGNOSIS — K63.3 ULCER OF INTESTINE: ICD-10-CM

## 2024-06-21 DIAGNOSIS — R10.84 GENERALIZED ABDOMINAL PAIN: ICD-10-CM

## 2024-06-21 LAB
GI HISTOLOGY: A. TERMINAL ILEUM: (no result)
GI HISTOLOGY: C. ASCENDING COLON: (no result)
GI HISTOLOGY: D. TRANSVERSE COLON: (no result)
GI HISTOLOGY: E. DESCENDING COLON: (no result)
GI HISTOLOGY: F. SIGMOID COLON: (no result)
GI HISTOLOGY: G. RECTUM: (no result)
GI HISTOLOGY: PDF REPORT: (no result)
Lab: (no result)

## 2024-06-21 PROCEDURE — 88305 TISSUE EXAM BY PATHOLOGIST: CPT | Performed by: PATHOLOGY

## 2024-06-21 PROCEDURE — 45380 COLONOSCOPY AND BIOPSY: CPT | Performed by: INTERNAL MEDICINE

## 2024-09-04 RX ORDER — BUPROPION HYDROCHLORIDE 300 MG/1
300 TABLET ORAL DAILY
Qty: 90 TABLET | Refills: 1 | OUTPATIENT
Start: 2024-09-04

## 2024-09-06 RX ORDER — BUPROPION HYDROCHLORIDE 300 MG/1
300 TABLET ORAL DAILY
Qty: 90 TABLET | Refills: 1 | OUTPATIENT
Start: 2024-09-06

## 2024-09-12 RX ORDER — BUPROPION HYDROCHLORIDE 300 MG/1
300 TABLET ORAL DAILY
Qty: 90 TABLET | Refills: 1 | OUTPATIENT
Start: 2024-09-12

## 2024-10-20 DIAGNOSIS — F33.41 RECURRENT MAJOR DEPRESSION IN PARTIAL REMISSION: ICD-10-CM

## 2024-10-21 RX ORDER — VENLAFAXINE HYDROCHLORIDE 75 MG/1
225 CAPSULE, EXTENDED RELEASE ORAL DAILY
Qty: 270 CAPSULE | Refills: 1 | OUTPATIENT
Start: 2024-10-21

## 2024-10-21 RX ORDER — LEVOTHYROXINE SODIUM 25 UG/1
25 TABLET ORAL DAILY
Qty: 90 TABLET | Refills: 1 | OUTPATIENT
Start: 2024-10-21

## 2024-10-22 DIAGNOSIS — F33.41 RECURRENT MAJOR DEPRESSION IN PARTIAL REMISSION: ICD-10-CM

## 2024-10-22 RX ORDER — LEVOTHYROXINE SODIUM 25 UG/1
25 TABLET ORAL DAILY
Qty: 90 TABLET | Refills: 1 | Status: SHIPPED | OUTPATIENT
Start: 2024-10-22

## 2024-10-22 RX ORDER — VENLAFAXINE HYDROCHLORIDE 75 MG/1
225 CAPSULE, EXTENDED RELEASE ORAL DAILY
Qty: 270 CAPSULE | Refills: 1 | Status: SHIPPED | OUTPATIENT
Start: 2024-10-22

## 2024-10-31 ENCOUNTER — TELEPHONE (OUTPATIENT)
Dept: INTERNAL MEDICINE | Age: 34
End: 2024-10-31
Payer: COMMERCIAL

## 2024-10-31 DIAGNOSIS — Z00.00 WELL ADULT EXAM: Primary | ICD-10-CM

## 2024-10-31 NOTE — TELEPHONE ENCOUNTER
Lvm informing pt that cuong has placed routine fasting labs for her to complete 2-3 days prior to her appt on 12/04, 1pm.   Pt must be NPO after midnight, meds allowed and increased water intake encouraged.

## 2024-12-02 ENCOUNTER — LAB (OUTPATIENT)
Dept: LAB | Facility: HOSPITAL | Age: 34
End: 2024-12-02
Payer: COMMERCIAL

## 2024-12-02 DIAGNOSIS — Z00.00 WELL ADULT EXAM: ICD-10-CM

## 2024-12-02 DIAGNOSIS — D50.9 IRON DEFICIENCY ANEMIA, UNSPECIFIED IRON DEFICIENCY ANEMIA TYPE: Primary | ICD-10-CM

## 2024-12-02 DIAGNOSIS — D50.9 IRON DEFICIENCY ANEMIA, UNSPECIFIED IRON DEFICIENCY ANEMIA TYPE: ICD-10-CM

## 2024-12-02 LAB
25(OH)D3 SERPL-MCNC: 18.4 NG/ML (ref 30–100)
ALBUMIN SERPL-MCNC: 3.9 G/DL (ref 3.5–5.2)
ALBUMIN/GLOB SERPL: 1.4 G/DL
ALP SERPL-CCNC: 87 U/L (ref 39–117)
ALT SERPL W P-5'-P-CCNC: 17 U/L (ref 1–33)
ANION GAP SERPL CALCULATED.3IONS-SCNC: 8.7 MMOL/L (ref 5–15)
AST SERPL-CCNC: 22 U/L (ref 1–32)
BASOPHILS # BLD AUTO: 0.06 10*3/MM3 (ref 0–0.2)
BASOPHILS NFR BLD AUTO: 1 % (ref 0–1.5)
BILIRUB SERPL-MCNC: <0.2 MG/DL (ref 0–1.2)
BILIRUB UR QL STRIP: NEGATIVE
BUN SERPL-MCNC: 10 MG/DL (ref 6–20)
BUN/CREAT SERPL: 12.8 (ref 7–25)
CALCIUM SPEC-SCNC: 8.8 MG/DL (ref 8.6–10.5)
CHLORIDE SERPL-SCNC: 105 MMOL/L (ref 98–107)
CHOLEST SERPL-MCNC: 136 MG/DL (ref 0–200)
CLARITY UR: CLEAR
CO2 SERPL-SCNC: 24.3 MMOL/L (ref 22–29)
COLOR UR: YELLOW
CREAT SERPL-MCNC: 0.78 MG/DL (ref 0.57–1)
DEPRECATED RDW RBC AUTO: 41.8 FL (ref 37–54)
EGFRCR SERPLBLD CKD-EPI 2021: 101.7 ML/MIN/1.73
EOSINOPHIL # BLD AUTO: 0.07 10*3/MM3 (ref 0–0.4)
EOSINOPHIL NFR BLD AUTO: 1.2 % (ref 0.3–6.2)
ERYTHROCYTE [DISTWIDTH] IN BLOOD BY AUTOMATED COUNT: 15.6 % (ref 12.3–15.4)
FERRITIN SERPL-MCNC: 3.92 NG/ML (ref 13–150)
FOLATE SERPL-MCNC: 4.5 NG/ML (ref 4.78–24.2)
GLOBULIN UR ELPH-MCNC: 2.8 GM/DL
GLUCOSE SERPL-MCNC: 94 MG/DL (ref 65–99)
GLUCOSE UR STRIP-MCNC: NEGATIVE MG/DL
HBA1C MFR BLD: <4.3 % (ref 4.8–5.6)
HCT VFR BLD AUTO: 28.7 % (ref 34–46.6)
HDLC SERPL-MCNC: 70 MG/DL (ref 40–60)
HGB BLD-MCNC: 8.5 G/DL (ref 12–15.9)
HGB UR QL STRIP.AUTO: NEGATIVE
IMM GRANULOCYTES # BLD AUTO: 0.02 10*3/MM3 (ref 0–0.05)
IMM GRANULOCYTES NFR BLD AUTO: 0.3 % (ref 0–0.5)
IRON 24H UR-MRATE: 11 MCG/DL (ref 37–145)
IRON SATN MFR SERPL: 2 % (ref 20–50)
KETONES UR QL STRIP: NEGATIVE
LDLC SERPL CALC-MCNC: 47 MG/DL (ref 0–100)
LDLC/HDLC SERPL: 0.63 {RATIO}
LEUKOCYTE ESTERASE UR QL STRIP.AUTO: NEGATIVE
LYMPHOCYTES # BLD AUTO: 0.83 10*3/MM3 (ref 0.7–3.1)
LYMPHOCYTES NFR BLD AUTO: 13.7 % (ref 19.6–45.3)
MAGNESIUM SERPL-MCNC: 1.9 MG/DL (ref 1.6–2.6)
MCH RBC QN AUTO: 22.3 PG (ref 26.6–33)
MCHC RBC AUTO-ENTMCNC: 29.6 G/DL (ref 31.5–35.7)
MCV RBC AUTO: 75.3 FL (ref 79–97)
MONOCYTES # BLD AUTO: 0.32 10*3/MM3 (ref 0.1–0.9)
MONOCYTES NFR BLD AUTO: 5.3 % (ref 5–12)
NEUTROPHILS NFR BLD AUTO: 4.75 10*3/MM3 (ref 1.7–7)
NEUTROPHILS NFR BLD AUTO: 78.5 % (ref 42.7–76)
NITRITE UR QL STRIP: NEGATIVE
NRBC BLD AUTO-RTO: 0 /100 WBC (ref 0–0.2)
PH UR STRIP.AUTO: 6 [PH] (ref 5–8)
PLATELET # BLD AUTO: 407 10*3/MM3 (ref 140–450)
PMV BLD AUTO: 9.4 FL (ref 6–12)
POTASSIUM SERPL-SCNC: 4.6 MMOL/L (ref 3.5–5.2)
PROT SERPL-MCNC: 6.7 G/DL (ref 6–8.5)
PROT UR QL STRIP: NEGATIVE
RBC # BLD AUTO: 3.81 10*6/MM3 (ref 3.77–5.28)
SODIUM SERPL-SCNC: 138 MMOL/L (ref 136–145)
SP GR UR STRIP: 1.02 (ref 1–1.03)
T4 FREE SERPL-MCNC: 0.83 NG/DL (ref 0.92–1.68)
TIBC SERPL-MCNC: 600 MCG/DL (ref 298–536)
TRANSFERRIN SERPL-MCNC: 403 MG/DL (ref 200–360)
TRIGL SERPL-MCNC: 109 MG/DL (ref 0–150)
TSH SERPL DL<=0.05 MIU/L-ACNC: 3.22 UIU/ML (ref 0.27–4.2)
UROBILINOGEN UR QL STRIP: NORMAL
VIT B12 BLD-MCNC: 583 PG/ML (ref 211–946)
VLDLC SERPL-MCNC: 19 MG/DL (ref 5–40)
WBC NRBC COR # BLD AUTO: 6.05 10*3/MM3 (ref 3.4–10.8)

## 2024-12-02 PROCEDURE — 82607 VITAMIN B-12: CPT

## 2024-12-02 PROCEDURE — 82306 VITAMIN D 25 HYDROXY: CPT

## 2024-12-02 PROCEDURE — 83540 ASSAY OF IRON: CPT

## 2024-12-02 PROCEDURE — 81003 URINALYSIS AUTO W/O SCOPE: CPT

## 2024-12-02 PROCEDURE — 83735 ASSAY OF MAGNESIUM: CPT

## 2024-12-02 PROCEDURE — 80061 LIPID PANEL: CPT

## 2024-12-02 PROCEDURE — 80050 GENERAL HEALTH PANEL: CPT

## 2024-12-02 PROCEDURE — 82728 ASSAY OF FERRITIN: CPT

## 2024-12-02 PROCEDURE — 36415 COLL VENOUS BLD VENIPUNCTURE: CPT

## 2024-12-02 PROCEDURE — 82746 ASSAY OF FOLIC ACID SERUM: CPT

## 2024-12-02 PROCEDURE — 84466 ASSAY OF TRANSFERRIN: CPT

## 2024-12-02 PROCEDURE — 84439 ASSAY OF FREE THYROXINE: CPT

## 2024-12-02 PROCEDURE — 83036 HEMOGLOBIN GLYCOSYLATED A1C: CPT

## 2024-12-04 ENCOUNTER — OFFICE VISIT (OUTPATIENT)
Dept: INTERNAL MEDICINE | Age: 34
End: 2024-12-04
Payer: COMMERCIAL

## 2024-12-04 VITALS
TEMPERATURE: 99.6 F | SYSTOLIC BLOOD PRESSURE: 126 MMHG | WEIGHT: 181.6 LBS | OXYGEN SATURATION: 99 % | BODY MASS INDEX: 26.9 KG/M2 | DIASTOLIC BLOOD PRESSURE: 80 MMHG | HEART RATE: 80 BPM | HEIGHT: 69 IN

## 2024-12-04 DIAGNOSIS — K50.011 CROHN'S DISEASE OF SMALL INTESTINE WITH RECTAL BLEEDING: ICD-10-CM

## 2024-12-04 DIAGNOSIS — R01.1 NEWLY RECOGNIZED HEART MURMUR: ICD-10-CM

## 2024-12-04 DIAGNOSIS — T45.4X5A ADVERSE EFFECT OF IRON, INITIAL ENCOUNTER: ICD-10-CM

## 2024-12-04 DIAGNOSIS — E55.9 VITAMIN D DEFICIENCY: ICD-10-CM

## 2024-12-04 DIAGNOSIS — D50.0 IRON DEFICIENCY ANEMIA DUE TO CHRONIC BLOOD LOSS: ICD-10-CM

## 2024-12-04 DIAGNOSIS — Z00.00 WELL ADULT EXAM: Primary | ICD-10-CM

## 2024-12-04 DIAGNOSIS — E03.4 HYPOTHYROIDISM DUE TO ACQUIRED ATROPHY OF THYROID: ICD-10-CM

## 2024-12-04 DIAGNOSIS — E53.8 VITAMIN B12 DEFICIENCY: ICD-10-CM

## 2024-12-04 PROBLEM — S90.811A: Status: RESOLVED | Noted: 2020-07-13 | Resolved: 2024-12-04

## 2024-12-04 PROBLEM — E03.8 SUBCLINICAL HYPOTHYROIDISM: Status: RESOLVED | Noted: 2022-04-25 | Resolved: 2024-12-04

## 2024-12-04 PROBLEM — M77.41 METATARSALGIA OF RIGHT FOOT: Status: RESOLVED | Noted: 2020-07-13 | Resolved: 2024-12-04

## 2024-12-04 PROBLEM — S86.301A INJURY OF PERONEAL TENDON OF RIGHT FOOT: Status: RESOLVED | Noted: 2020-07-13 | Resolved: 2024-12-04

## 2024-12-04 PROBLEM — R19.7 DIARRHEA: Status: RESOLVED | Noted: 2018-04-02 | Resolved: 2024-12-04

## 2024-12-04 PROBLEM — S93.401A SPRAIN OF RIGHT ANKLE: Status: RESOLVED | Noted: 2020-07-13 | Resolved: 2024-12-04

## 2024-12-04 PROBLEM — E87.6 HYPOKALEMIA: Status: RESOLVED | Noted: 2017-12-16 | Resolved: 2024-12-04

## 2024-12-04 PROBLEM — K43.9 VENTRAL HERNIA WITHOUT OBSTRUCTION OR GANGRENE: Status: RESOLVED | Noted: 2018-08-10 | Resolved: 2024-12-04

## 2024-12-04 PROBLEM — Z43.2 ATTENTION TO ILEOSTOMY: Status: RESOLVED | Noted: 2017-12-12 | Resolved: 2024-12-04

## 2024-12-04 PROCEDURE — 99214 OFFICE O/P EST MOD 30 MIN: CPT | Performed by: INTERNAL MEDICINE

## 2024-12-04 PROCEDURE — 99395 PREV VISIT EST AGE 18-39: CPT | Performed by: INTERNAL MEDICINE

## 2024-12-04 RX ORDER — SODIUM CHLORIDE 9 MG/ML
20 INJECTION, SOLUTION INTRAVENOUS ONCE
OUTPATIENT
Start: 2024-12-04

## 2024-12-04 RX ORDER — ACETAMINOPHEN 325 MG/1
650 TABLET ORAL ONCE
OUTPATIENT
Start: 2024-12-04

## 2024-12-04 RX ORDER — CETIRIZINE HYDROCHLORIDE 10 MG/1
10 TABLET ORAL ONCE
OUTPATIENT
Start: 2024-12-04

## 2024-12-04 RX ORDER — HYDROCORTISONE SODIUM SUCCINATE 100 MG/2ML
100 INJECTION INTRAMUSCULAR; INTRAVENOUS AS NEEDED
OUTPATIENT
Start: 2024-12-04

## 2024-12-04 RX ORDER — DIPHENHYDRAMINE HYDROCHLORIDE 50 MG/ML
50 INJECTION INTRAMUSCULAR; INTRAVENOUS AS NEEDED
OUTPATIENT
Start: 2024-12-04

## 2024-12-04 RX ORDER — FAMOTIDINE 10 MG/ML
20 INJECTION, SOLUTION INTRAVENOUS AS NEEDED
OUTPATIENT
Start: 2024-12-04

## 2024-12-04 RX ORDER — FAMOTIDINE 20 MG/1
20 TABLET, FILM COATED ORAL ONCE
OUTPATIENT
Start: 2024-12-04

## 2024-12-04 NOTE — ASSESSMENT & PLAN NOTE
B12 is up from 300 ballpark to the 600 ballpark as of her 12/24 OV.  She is just on low-dose over-the-counter supplementation and stable to continue same.

## 2024-12-04 NOTE — ASSESSMENT & PLAN NOTE
TSH was 5.8 in 4/23, she has been on low-dose supplementation since then, TSH is 3.3 as of her 12/24 OV.  She is stable to continue just 25 mcg daily.

## 2024-12-04 NOTE — PATIENT INSTRUCTIONS
You have orders for nonfasting vitamin D level to be obtained in around 4 months or so, please just call for those results.    2.  Increase Vitamin D3 to 4000 international units once daily as we discussed.

## 2024-12-04 NOTE — ASSESSMENT & PLAN NOTE
Vitamin D is staying in the 17 ballpark as of her 12/24 OV.  She is on 2000 units daily over-the-counter, we will increase this to 4000, check levels again in about 4 months, asked her to call for the results.  If not improving, she will get on the prescription dose.

## 2024-12-04 NOTE — ASSESSMENT & PLAN NOTE
She has had a least a mild flare here over the past month, in regards to blood in her stool and frequency thereof.  Had a bad week, but appears to be calming down.  Her weight is stable, labs look good from a nutritional standpoint at least.    We are addressing this underneath the anemia heading, patient will reach back out to GI if symptoms persist.

## 2024-12-04 NOTE — ASSESSMENT & PLAN NOTE
Patient has a 2/6 to 3/6 systolic murmur left upper sternal border.  Sounds like a innocent flow murmur, possibly just related to her current anemia.  Will reevaluate on return to office and/or for any symptoms.

## 2024-12-04 NOTE — PROGRESS NOTES
"Chief Complaint  Annual Exam (Pt has had labs. Annual physical. No new issues.) and Hypothyroidism    Subjective      Doreen Stephenson presents to [facility]    Hypothyroidism  Symptoms include anxiety and depressed mood. Patient reports no fatigue or palpitations.     History of present illness:  Patient is a pleasant but unfortunate 35 yo female with underlying IBD, who is coming in 12/24 for Annual Exam.  We will review her medications, go over her labs in detail, and address any new concerns.    Review of Systems   Constitutional:  Negative for appetite change, fatigue and fever.   HENT:  Negative for congestion and ear pain.    Eyes:  Negative for blurred vision.   Respiratory:  Negative for cough, chest tightness, shortness of breath and wheezing.    Cardiovascular:  Negative for chest pain, palpitations and leg swelling.   Gastrointestinal:  Negative for abdominal pain.   Genitourinary:  Negative for difficulty urinating, dysuria and hematuria.   Musculoskeletal:  Negative for arthralgias and gait problem.   Skin:  Negative for skin lesions.   Neurological:  Negative for syncope, memory problem and confusion.   Psychiatric/Behavioral:  Positive for depressed mood. Negative for self-injury. The patient is nervous/anxious.        Objective   Vital Signs:   /80 (BP Location: Left arm, Patient Position: Sitting, Cuff Size: Adult)   Pulse 80   Temp 99.6 °F (37.6 °C) (Skin)   Ht 175.3 cm (69\")   Wt 82.4 kg (181 lb 9.6 oz)   SpO2 99%   BMI 26.82 kg/m²           Physical Exam  Vitals and nursing note reviewed.   Constitutional:       General: She is not in acute distress.     Appearance: Normal appearance. She is not toxic-appearing.   HENT:      Head: Atraumatic.      Right Ear: External ear normal.      Left Ear: External ear normal.      Nose: Nose normal.      Mouth/Throat:      Mouth: Mucous membranes are moist.   Eyes:      General:         Right eye: No " discharge.         Left eye: No discharge.      Extraocular Movements: Extraocular movements intact.      Pupils: Pupils are equal, round, and reactive to light.   Neck:      Comments: No carotid bruits.  Cardiovascular:      Rate and Rhythm: Normal rate and regular rhythm.      Pulses: Normal pulses.      Heart sounds: Normal heart sounds. No murmur heard.     No gallop.      Comments: Heart tones normal, no ectopy, no S3.  Pulmonary:      Effort: Pulmonary effort is normal. No respiratory distress.      Breath sounds: No wheezing, rhonchi or rales.      Comments: Lung fields clear bilaterally.  Abdominal:      General: There is no distension.      Palpations: Abdomen is soft. There is no mass.      Tenderness: There is no abdominal tenderness. There is no guarding.   Musculoskeletal:         General: No swelling or tenderness.      Cervical back: No tenderness.      Right lower leg: No edema.      Left lower leg: No edema.      Comments: No edema.   Skin:     General: Skin is warm and dry.      Findings: No rash.   Neurological:      General: No focal deficit present.      Mental Status: She is alert and oriented to person, place, and time. Mental status is at baseline.      Motor: No weakness.      Gait: Gait normal.   Psychiatric:         Mood and Affect: Mood normal.         Thought Content: Thought content normal.          Result Review   The following data was reviewed by: Chano Ortiz MD on 04/25/2022:  [x] Laboratory  [] Microbiology  [] Radiology  [] EKG/telemetry  [] Cardiology/Vascular  [] Pathology  [x] Old records             Assessment and Plan   Diagnoses and all orders for this visit:    1. Well adult exam (Primary)  Overview:  Preventive measures: were reviewed with the patient at this office visit.  They included but were not limited to discussions in regards to vaccines outstanding, auto safety with seat belts and other assistive devices, fall prevention, and routine screening  studies.    Exercise: No ischemic symptoms with activity at work, walking, housework, etc as of 12/24.   Comprehensive labs: Reviewed labs from 12/24 at her OV.        Covid vaccine: Deferred going forward as of 12/24.  Other vaccines: Flu shot up-to-date for 2024 season.    MMG: not yet (neg FH)  Colon: per GI = Dr Peña.    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  FH:  F passed 55 suicide, M disabled = cervical fusion, 2B = HTN.      2. Hypothyroidism due to acquired atrophy of thyroid  Assessment & Plan:  TSH was 5.8 in 4/23, she has been on low-dose supplementation since then, TSH is 3.3 as of her 12/24 OV.  She is stable to continue just 25 mcg daily.      3. Crohn's disease of small intestine with rectal bleeding  Assessment & Plan:  She has had a least a mild flare here over the past month, in regards to blood in her stool and frequency thereof.  Had a bad week, but appears to be calming down.  Her weight is stable, labs look good from a nutritional standpoint at least.    We are addressing this underneath the anemia heading, patient will reach back out to GI if symptoms persist.      4. Vitamin D deficiency  Assessment & Plan:  Vitamin D is staying in the 17 ballpark as of her 12/24 OV.  She is on 2000 units daily over-the-counter, we will increase this to 4000, check levels again in about 4 months, asked her to call for the results.  If not improving, she will get on the prescription dose.    Orders:  -     Vitamin D,25-Hydroxy; Future    5. Vitamin B12 deficiency  Assessment & Plan:  B12 is up from 300 ballpark to the 600 ballpark as of her 12/24 OV.  She is just on low-dose over-the-counter supplementation and stable to continue same.      6. Iron deficiency anemia due to chronic blood loss  Assessment & Plan:  Patient's hemoglobin is down from 13.8 to 8.5 as of her 12/24 OV.  Her ferritin level is in the single digits.  She has been  having a flare of her Crohn's with significant bleeding per rectum.    We will get her some IV iron since she is intolerant to oral iron.  As noted she has underlying Crohn's disease and she has had significant GI issues with oral iron in the past.    Orders:  -     CBC & Differential; Future  -     Iron Profile; Future  -     Ferritin; Future    7. Newly recognized heart murmur  Assessment & Plan:  Patient has a 2/6 to 3/6 systolic murmur left upper sternal border.  Sounds like a innocent flow murmur, possibly just related to her current anemia.  Will reevaluate on return to office and/or for any symptoms.      8. Adverse effect of iron, initial encounter        BMI is >= 25 and <30. (Overweight) The following options were offered after discussion;: none (medical contraindication) = IBD.     Follow Up   Return in about 1 year (around 12/4/2025).  Patient was given instructions and counseling regarding her condition or for health maintenance advice. Please see specific information pulled into the AVS if appropriate.

## 2024-12-04 NOTE — ASSESSMENT & PLAN NOTE
Patient's hemoglobin is down from 13.8 to 8.5 as of her 12/24 OV.  Her ferritin level is in the single digits.  She has been having a flare of her Crohn's with significant bleeding per rectum.    We will get her some IV iron since she is intolerant to oral iron.  As noted she has underlying Crohn's disease and she has had significant GI issues with oral iron in the past.

## 2024-12-11 ENCOUNTER — PREP FOR SURGERY (OUTPATIENT)
Dept: OTHER | Facility: HOSPITAL | Age: 34
End: 2024-12-11
Payer: COMMERCIAL

## 2024-12-11 ENCOUNTER — APPOINTMENT (OUTPATIENT)
Dept: CT IMAGING | Facility: HOSPITAL | Age: 34
End: 2024-12-11
Payer: COMMERCIAL

## 2024-12-11 ENCOUNTER — HOSPITAL ENCOUNTER (OUTPATIENT)
Facility: HOSPITAL | Age: 34
Setting detail: OBSERVATION
Discharge: HOME | End: 2024-12-13
Attending: EMERGENCY MEDICINE | Admitting: EMERGENCY MEDICINE
Payer: COMMERCIAL

## 2024-12-11 DIAGNOSIS — R10.84 GENERALIZED ABDOMINAL PAIN: ICD-10-CM

## 2024-12-11 DIAGNOSIS — K50.111 CROHN'S DISEASE OF LARGE INTESTINE WITH RECTAL BLEEDING: ICD-10-CM

## 2024-12-11 DIAGNOSIS — K92.2 LOWER GI BLEEDING: Primary | ICD-10-CM

## 2024-12-11 DIAGNOSIS — K50.919 CROHN'S DISEASE WITH COMPLICATION, UNSPECIFIED GASTROINTESTINAL TRACT LOCATION: ICD-10-CM

## 2024-12-11 DIAGNOSIS — K50.111 CROHN'S DISEASE OF LARGE INTESTINE WITH RECTAL BLEEDING: Primary | ICD-10-CM

## 2024-12-11 DIAGNOSIS — D64.9 ANEMIA, UNSPECIFIED TYPE: ICD-10-CM

## 2024-12-11 PROBLEM — R10.9 ABDOMINAL PAIN: Status: ACTIVE | Noted: 2024-12-11

## 2024-12-11 LAB
ABO GROUP BLD: NORMAL
ADV 40+41 DNA STL QL NAA+NON-PROBE: NOT DETECTED
ALBUMIN SERPL-MCNC: 3.5 G/DL (ref 3.5–5.2)
ALBUMIN/GLOB SERPL: 0.9 G/DL
ALP SERPL-CCNC: 74 U/L (ref 39–117)
ALT SERPL W P-5'-P-CCNC: 13 U/L (ref 1–33)
ANION GAP SERPL CALCULATED.3IONS-SCNC: 9.7 MMOL/L (ref 5–15)
AST SERPL-CCNC: 21 U/L (ref 1–32)
ASTRO TYP 1-8 RNA STL QL NAA+NON-PROBE: NOT DETECTED
BACTERIA UR QL AUTO: ABNORMAL /HPF
BASOPHILS # BLD AUTO: 0.04 10*3/MM3 (ref 0–0.2)
BASOPHILS NFR BLD AUTO: 0.6 % (ref 0–1.5)
BILIRUB SERPL-MCNC: <0.2 MG/DL (ref 0–1.2)
BILIRUB UR QL STRIP: NEGATIVE
BLD GP AB SCN SERPL QL: NEGATIVE
BUN SERPL-MCNC: 8 MG/DL (ref 6–20)
BUN/CREAT SERPL: 10 (ref 7–25)
C CAYETANENSIS DNA STL QL NAA+NON-PROBE: NOT DETECTED
C COLI+JEJ+UPSA DNA STL QL NAA+NON-PROBE: NOT DETECTED
CALCIUM SPEC-SCNC: 8.6 MG/DL (ref 8.6–10.5)
CHLORIDE SERPL-SCNC: 105 MMOL/L (ref 98–107)
CLARITY UR: CLEAR
CO2 SERPL-SCNC: 22.3 MMOL/L (ref 22–29)
COLOR UR: YELLOW
CREAT SERPL-MCNC: 0.8 MG/DL (ref 0.57–1)
CRP SERPL-MCNC: <0.3 MG/DL (ref 0–0.5)
CRYPTOSP DNA STL QL NAA+NON-PROBE: NOT DETECTED
D-LACTATE SERPL-SCNC: 1.9 MMOL/L (ref 0.5–2)
DEPRECATED RDW RBC AUTO: 43.4 FL (ref 37–54)
E HISTOLYT DNA STL QL NAA+NON-PROBE: NOT DETECTED
EAEC PAA PLAS AGGR+AATA ST NAA+NON-PRB: NOT DETECTED
EC STX1+STX2 GENES STL QL NAA+NON-PROBE: NOT DETECTED
EGFRCR SERPLBLD CKD-EPI 2021: 98.7 ML/MIN/1.73
EOSINOPHIL # BLD AUTO: 0.01 10*3/MM3 (ref 0–0.4)
EOSINOPHIL NFR BLD AUTO: 0.2 % (ref 0.3–6.2)
EPEC EAE GENE STL QL NAA+NON-PROBE: NOT DETECTED
ERYTHROCYTE [DISTWIDTH] IN BLOOD BY AUTOMATED COUNT: 15.6 % (ref 12.3–15.4)
ETEC LTA+ST1A+ST1B TOX ST NAA+NON-PROBE: NOT DETECTED
G LAMBLIA DNA STL QL NAA+NON-PROBE: NOT DETECTED
GLOBULIN UR ELPH-MCNC: 3.7 GM/DL
GLUCOSE SERPL-MCNC: 103 MG/DL (ref 65–99)
GLUCOSE UR STRIP-MCNC: NEGATIVE MG/DL
HCG SERPL QL: NEGATIVE
HCT VFR BLD AUTO: 28.7 % (ref 34–46.6)
HCT VFR BLD AUTO: 29 % (ref 34–46.6)
HGB BLD-MCNC: 8 G/DL (ref 12–15.9)
HGB BLD-MCNC: 8.2 G/DL (ref 12–15.9)
HGB UR QL STRIP.AUTO: NEGATIVE
HYALINE CASTS UR QL AUTO: ABNORMAL /LPF
IMM GRANULOCYTES # BLD AUTO: 0.02 10*3/MM3 (ref 0–0.05)
IMM GRANULOCYTES NFR BLD AUTO: 0.3 % (ref 0–0.5)
IRON 24H UR-MRATE: 14 MCG/DL (ref 37–145)
IRON SATN MFR SERPL: 2 % (ref 20–50)
KETONES UR QL STRIP: NEGATIVE
LEUKOCYTE ESTERASE UR QL STRIP.AUTO: ABNORMAL
LIPASE SERPL-CCNC: 38 U/L (ref 13–60)
LYMPHOCYTES # BLD AUTO: 0.43 10*3/MM3 (ref 0.7–3.1)
LYMPHOCYTES NFR BLD AUTO: 6.5 % (ref 19.6–45.3)
MAGNESIUM SERPL-MCNC: 1.9 MG/DL (ref 1.6–2.6)
MCH RBC QN AUTO: 21.4 PG (ref 26.6–33)
MCHC RBC AUTO-ENTMCNC: 27.6 G/DL (ref 31.5–35.7)
MCV RBC AUTO: 77.7 FL (ref 79–97)
MONOCYTES # BLD AUTO: 0.11 10*3/MM3 (ref 0.1–0.9)
MONOCYTES NFR BLD AUTO: 1.7 % (ref 5–12)
NEUTROPHILS NFR BLD AUTO: 6.01 10*3/MM3 (ref 1.7–7)
NEUTROPHILS NFR BLD AUTO: 90.7 % (ref 42.7–76)
NITRITE UR QL STRIP: NEGATIVE
NOROVIRUS GI+II RNA STL QL NAA+NON-PROBE: NOT DETECTED
NRBC BLD AUTO-RTO: 0 /100 WBC (ref 0–0.2)
P SHIGELLOIDES DNA STL QL NAA+NON-PROBE: NOT DETECTED
PH UR STRIP.AUTO: 6 [PH] (ref 5–8)
PHOSPHATE SERPL-MCNC: 2.1 MG/DL (ref 2.5–4.5)
PLATELET # BLD AUTO: 327 10*3/MM3 (ref 140–450)
PMV BLD AUTO: 9.6 FL (ref 6–12)
POTASSIUM SERPL-SCNC: 3.9 MMOL/L (ref 3.5–5.2)
PROCALCITONIN SERPL-MCNC: 0.02 NG/ML (ref 0–0.25)
PROT SERPL-MCNC: 7.2 G/DL (ref 6–8.5)
PROT UR QL STRIP: NEGATIVE
RBC # BLD AUTO: 3.73 10*6/MM3 (ref 3.77–5.28)
RBC # UR STRIP: ABNORMAL /HPF
REF LAB TEST METHOD: ABNORMAL
RH BLD: POSITIVE
RVA RNA STL QL NAA+NON-PROBE: NOT DETECTED
S ENT+BONG DNA STL QL NAA+NON-PROBE: NOT DETECTED
SAPO I+II+IV+V RNA STL QL NAA+NON-PROBE: NOT DETECTED
SHIGELLA SP+EIEC IPAH ST NAA+NON-PROBE: NOT DETECTED
SODIUM SERPL-SCNC: 137 MMOL/L (ref 136–145)
SP GR UR STRIP: <=1.005 (ref 1–1.03)
SQUAMOUS #/AREA URNS HPF: ABNORMAL /HPF
T&S EXPIRATION DATE: NORMAL
TIBC SERPL-MCNC: 617 MCG/DL (ref 298–536)
TRANSFERRIN SERPL-MCNC: 414 MG/DL (ref 200–360)
TSH SERPL DL<=0.05 MIU/L-ACNC: 2.32 UIU/ML (ref 0.27–4.2)
UROBILINOGEN UR QL STRIP: ABNORMAL
V CHOL+PARA+VUL DNA STL QL NAA+NON-PROBE: NOT DETECTED
V CHOLERAE DNA STL QL NAA+NON-PROBE: NOT DETECTED
WBC # UR STRIP: ABNORMAL /HPF
WBC NRBC COR # BLD AUTO: 6.62 10*3/MM3 (ref 3.4–10.8)
WHOLE BLOOD HOLD COAG: NORMAL
Y ENTEROCOL DNA STL QL NAA+NON-PROBE: NOT DETECTED

## 2024-12-11 PROCEDURE — 25010000002 ONDANSETRON PER 1 MG: Performed by: EMERGENCY MEDICINE

## 2024-12-11 PROCEDURE — 84100 ASSAY OF PHOSPHORUS: CPT | Performed by: EMERGENCY MEDICINE

## 2024-12-11 PROCEDURE — 86901 BLOOD TYPING SEROLOGIC RH(D): CPT | Performed by: EMERGENCY MEDICINE

## 2024-12-11 PROCEDURE — G0378 HOSPITAL OBSERVATION PER HR: HCPCS

## 2024-12-11 PROCEDURE — 83605 ASSAY OF LACTIC ACID: CPT | Performed by: EMERGENCY MEDICINE

## 2024-12-11 PROCEDURE — 83540 ASSAY OF IRON: CPT | Performed by: NURSE PRACTITIONER

## 2024-12-11 PROCEDURE — 25510000001 IOPAMIDOL 61 % SOLUTION: Performed by: EMERGENCY MEDICINE

## 2024-12-11 PROCEDURE — 25010000002 DIPHENHYDRAMINE PER 50 MG: Performed by: STUDENT IN AN ORGANIZED HEALTH CARE EDUCATION/TRAINING PROGRAM

## 2024-12-11 PROCEDURE — 99285 EMERGENCY DEPT VISIT HI MDM: CPT

## 2024-12-11 PROCEDURE — 83690 ASSAY OF LIPASE: CPT | Performed by: EMERGENCY MEDICINE

## 2024-12-11 PROCEDURE — 96374 THER/PROPH/DIAG INJ IV PUSH: CPT

## 2024-12-11 PROCEDURE — 80050 GENERAL HEALTH PANEL: CPT | Performed by: EMERGENCY MEDICINE

## 2024-12-11 PROCEDURE — 74177 CT ABD & PELVIS W/CONTRAST: CPT

## 2024-12-11 PROCEDURE — 81001 URINALYSIS AUTO W/SCOPE: CPT | Performed by: EMERGENCY MEDICINE

## 2024-12-11 PROCEDURE — 99204 OFFICE O/P NEW MOD 45 MIN: CPT | Performed by: INTERNAL MEDICINE

## 2024-12-11 PROCEDURE — 25010000002 PROCHLORPERAZINE 10 MG/2ML SOLUTION: Performed by: STUDENT IN AN ORGANIZED HEALTH CARE EDUCATION/TRAINING PROGRAM

## 2024-12-11 PROCEDURE — 84703 CHORIONIC GONADOTROPIN ASSAY: CPT | Performed by: EMERGENCY MEDICINE

## 2024-12-11 PROCEDURE — 25010000002 METHYLPREDNISOLONE PER 125 MG: Performed by: NURSE PRACTITIONER

## 2024-12-11 PROCEDURE — 84145 PROCALCITONIN (PCT): CPT | Performed by: EMERGENCY MEDICINE

## 2024-12-11 PROCEDURE — 87507 IADNA-DNA/RNA PROBE TQ 12-25: CPT | Performed by: NURSE PRACTITIONER

## 2024-12-11 PROCEDURE — 86900 BLOOD TYPING SEROLOGIC ABO: CPT | Performed by: EMERGENCY MEDICINE

## 2024-12-11 PROCEDURE — 86140 C-REACTIVE PROTEIN: CPT | Performed by: EMERGENCY MEDICINE

## 2024-12-11 PROCEDURE — 96375 TX/PRO/DX INJ NEW DRUG ADDON: CPT

## 2024-12-11 PROCEDURE — 25810000003 SODIUM CHLORIDE 0.9 % SOLUTION: Performed by: NURSE PRACTITIONER

## 2024-12-11 PROCEDURE — 25010000002 FENTANYL CITRATE (PF) 50 MCG/ML SOLUTION: Performed by: EMERGENCY MEDICINE

## 2024-12-11 PROCEDURE — 83735 ASSAY OF MAGNESIUM: CPT | Performed by: EMERGENCY MEDICINE

## 2024-12-11 PROCEDURE — 25010000002 NA FERRIC GLUC CPLX PER 12.5 MG: Performed by: NURSE PRACTITIONER

## 2024-12-11 PROCEDURE — 25810000003 SODIUM CHLORIDE 0.9 % SOLUTION: Performed by: EMERGENCY MEDICINE

## 2024-12-11 PROCEDURE — 86850 RBC ANTIBODY SCREEN: CPT | Performed by: EMERGENCY MEDICINE

## 2024-12-11 PROCEDURE — 85014 HEMATOCRIT: CPT | Performed by: NURSE PRACTITIONER

## 2024-12-11 PROCEDURE — 85018 HEMOGLOBIN: CPT | Performed by: NURSE PRACTITIONER

## 2024-12-11 PROCEDURE — 84466 ASSAY OF TRANSFERRIN: CPT | Performed by: NURSE PRACTITIONER

## 2024-12-11 RX ORDER — ONDANSETRON 2 MG/ML
4 INJECTION INTRAMUSCULAR; INTRAVENOUS ONCE
Status: COMPLETED | OUTPATIENT
Start: 2024-12-11 | End: 2024-12-11

## 2024-12-11 RX ORDER — SODIUM CHLORIDE 0.9 % (FLUSH) 0.9 %
10 SYRINGE (ML) INJECTION AS NEEDED
Status: DISCONTINUED | OUTPATIENT
Start: 2024-12-11 | End: 2024-12-13 | Stop reason: HOSPADM

## 2024-12-11 RX ORDER — SODIUM CHLORIDE 9 MG/ML
40 INJECTION, SOLUTION INTRAVENOUS AS NEEDED
Status: DISCONTINUED | OUTPATIENT
Start: 2024-12-11 | End: 2024-12-13 | Stop reason: HOSPADM

## 2024-12-11 RX ORDER — DICYCLOMINE HYDROCHLORIDE 10 MG/1
10 CAPSULE ORAL 4 TIMES DAILY
Status: DISCONTINUED | OUTPATIENT
Start: 2024-12-11 | End: 2024-12-13 | Stop reason: HOSPADM

## 2024-12-11 RX ORDER — IOPAMIDOL 612 MG/ML
100 INJECTION, SOLUTION INTRAVASCULAR
Status: COMPLETED | OUTPATIENT
Start: 2024-12-11 | End: 2024-12-11

## 2024-12-11 RX ORDER — ONDANSETRON 2 MG/ML
4 INJECTION INTRAMUSCULAR; INTRAVENOUS EVERY 6 HOURS PRN
Status: DISCONTINUED | OUTPATIENT
Start: 2024-12-11 | End: 2024-12-13 | Stop reason: HOSPADM

## 2024-12-11 RX ORDER — PROCHLORPERAZINE EDISYLATE 5 MG/ML
5 INJECTION INTRAMUSCULAR; INTRAVENOUS ONCE
Status: COMPLETED | OUTPATIENT
Start: 2024-12-11 | End: 2024-12-11

## 2024-12-11 RX ORDER — ACETAMINOPHEN 325 MG/1
650 TABLET ORAL EVERY 6 HOURS PRN
Status: DISCONTINUED | OUTPATIENT
Start: 2024-12-11 | End: 2024-12-13 | Stop reason: HOSPADM

## 2024-12-11 RX ORDER — POLYETHYLENE GLYCOL 3350 17 G/17G
0.5 POWDER, FOR SOLUTION ORAL EVERY 12 HOURS
Status: COMPLETED | OUTPATIENT
Start: 2024-12-11 | End: 2024-12-12

## 2024-12-11 RX ORDER — SODIUM CHLORIDE 0.9 % (FLUSH) 0.9 %
10 SYRINGE (ML) INJECTION EVERY 12 HOURS SCHEDULED
Status: DISCONTINUED | OUTPATIENT
Start: 2024-12-11 | End: 2024-12-13 | Stop reason: HOSPADM

## 2024-12-11 RX ORDER — POLYETHYLENE GLYCOL 3350 17 G/17G
17 POWDER, FOR SOLUTION ORAL DAILY PRN
Status: DISCONTINUED | OUTPATIENT
Start: 2024-12-11 | End: 2024-12-13 | Stop reason: HOSPADM

## 2024-12-11 RX ORDER — HYDROCODONE BITARTRATE AND ACETAMINOPHEN 7.5; 325 MG/1; MG/1
1 TABLET ORAL EVERY 6 HOURS PRN
Status: DISCONTINUED | OUTPATIENT
Start: 2024-12-11 | End: 2024-12-13 | Stop reason: HOSPADM

## 2024-12-11 RX ORDER — DIPHENHYDRAMINE HYDROCHLORIDE 50 MG/ML
25 INJECTION INTRAMUSCULAR; INTRAVENOUS ONCE
Status: COMPLETED | OUTPATIENT
Start: 2024-12-11 | End: 2024-12-11

## 2024-12-11 RX ORDER — BISACODYL 10 MG
10 SUPPOSITORY, RECTAL RECTAL DAILY PRN
Status: DISCONTINUED | OUTPATIENT
Start: 2024-12-11 | End: 2024-12-13 | Stop reason: HOSPADM

## 2024-12-11 RX ORDER — AMOXICILLIN 250 MG
2 CAPSULE ORAL 2 TIMES DAILY PRN
Status: DISCONTINUED | OUTPATIENT
Start: 2024-12-11 | End: 2024-12-13 | Stop reason: HOSPADM

## 2024-12-11 RX ORDER — ONDANSETRON 4 MG/1
4 TABLET, ORALLY DISINTEGRATING ORAL EVERY 6 HOURS PRN
Status: DISCONTINUED | OUTPATIENT
Start: 2024-12-11 | End: 2024-12-13 | Stop reason: HOSPADM

## 2024-12-11 RX ORDER — METHYLPREDNISOLONE SODIUM SUCCINATE 125 MG/2ML
125 INJECTION, POWDER, LYOPHILIZED, FOR SOLUTION INTRAMUSCULAR; INTRAVENOUS ONCE
Status: COMPLETED | OUTPATIENT
Start: 2024-12-11 | End: 2024-12-11

## 2024-12-11 RX ORDER — FENTANYL CITRATE 50 UG/ML
50 INJECTION, SOLUTION INTRAMUSCULAR; INTRAVENOUS ONCE
Status: COMPLETED | OUTPATIENT
Start: 2024-12-11 | End: 2024-12-11

## 2024-12-11 RX ORDER — LEVOTHYROXINE SODIUM 25 UG/1
25 TABLET ORAL DAILY
Status: DISCONTINUED | OUTPATIENT
Start: 2024-12-12 | End: 2024-12-13 | Stop reason: HOSPADM

## 2024-12-11 RX ORDER — BISACODYL 5 MG/1
5 TABLET, DELAYED RELEASE ORAL DAILY PRN
Status: DISCONTINUED | OUTPATIENT
Start: 2024-12-11 | End: 2024-12-13 | Stop reason: HOSPADM

## 2024-12-11 RX ADMIN — Medication 10 ML: at 15:22

## 2024-12-11 RX ADMIN — FENTANYL CITRATE 50 MCG: 50 INJECTION, SOLUTION INTRAMUSCULAR; INTRAVENOUS at 10:26

## 2024-12-11 RX ADMIN — POLYETHYLENE GLYCOL 3350 0.5 BOTTLE: 17 POWDER, FOR SOLUTION ORAL at 17:59

## 2024-12-11 RX ADMIN — ACETAMINOPHEN 650 MG: 325 TABLET ORAL at 15:32

## 2024-12-11 RX ADMIN — PROCHLORPERAZINE EDISYLATE 5 MG: 5 INJECTION INTRAMUSCULAR; INTRAVENOUS at 21:41

## 2024-12-11 RX ADMIN — IOPAMIDOL 85 ML: 612 INJECTION, SOLUTION INTRAVENOUS at 11:23

## 2024-12-11 RX ADMIN — DICYCLOMINE HYDROCHLORIDE 10 MG: 10 CAPSULE ORAL at 20:51

## 2024-12-11 RX ADMIN — SODIUM CHLORIDE 250 MG: 9 INJECTION, SOLUTION INTRAVENOUS at 20:51

## 2024-12-11 RX ADMIN — ONDANSETRON 4 MG: 2 INJECTION, SOLUTION INTRAMUSCULAR; INTRAVENOUS at 10:25

## 2024-12-11 RX ADMIN — METHYLPREDNISOLONE SODIUM SUCCINATE 125 MG: 125 INJECTION INTRAMUSCULAR; INTRAVENOUS at 15:20

## 2024-12-11 RX ADMIN — DIPHENHYDRAMINE HYDROCHLORIDE 25 MG: 50 INJECTION, SOLUTION INTRAMUSCULAR; INTRAVENOUS at 21:41

## 2024-12-11 RX ADMIN — SODIUM CHLORIDE 500 ML: 9 INJECTION, SOLUTION INTRAVENOUS at 10:23

## 2024-12-11 RX ADMIN — HYDROCODONE BITARTRATE AND ACETAMINOPHEN 1 TABLET: 7.5; 325 TABLET ORAL at 17:58

## 2024-12-11 RX ADMIN — Medication 10 ML: at 20:57

## 2024-12-11 NOTE — PROGRESS NOTES
Clinical Pharmacy Services: Medication History    Doreen Stephenson is a 34 y.o. female presenting to [facility] for   Chief Complaint   Patient presents with    Black or Bloody Stool    Abdominal Pain       She  has a past medical history of Anemia, Anxiety, Crohn's disease, Depression, Incisional hernia, PONV (postoperative nausea and vomiting), and Ventral hernia.    Allergies as of 12/11/2024 - Reviewed 12/11/2024   Allergen Reaction Noted    Morphine Rash 09/07/2018       Medication information was obtained from: Patient   Pharmacy and Phone Number:     Prior to Admission Medications       Prescriptions Last Dose Informant Patient Reported? Taking?    Cyanocobalamin (VITAMIN B 12 PO)  Self Yes Yes    Take 1 tablet by mouth Daily.    levothyroxine (SYNTHROID, LEVOTHROID) 25 MCG tablet  Self No Yes    Take 1 tablet by mouth Daily.    Risankizumab-rzaa (Skyrizi) 180 MG/1.2ML solution cartridge  Self No Yes    Inject 180 mg under the skin into the appropriate area as directed Every 8 (Eight) Weeks.    venlafaxine XR (EFFEXOR-XR) 75 MG 24 hr capsule  Self No Yes    Take 3 capsules by mouth Daily.    VITAMIN D PO  Self Yes Yes    Take 4,000 Units by mouth Daily.              Medication notes:     This medication list is complete to the best of my knowledge as of 12/11/2024    Please call if questions.    Mag Gomez  Medication History Technician   [phone]    12/11/2024 13:29 EST

## 2024-12-11 NOTE — ED PROVIDER NOTES
EMERGENCY DEPARTMENT ENCOUNTER    Room Number:  31/31  PCP: Chano Ortiz MD  Independent Historians: Patient    HPI:  Chief Complaint: Abdominal pain and rectal bleeding    A complete HPI/ROS/PMH/PSH/SH/FH are unobtainable due to: None    Chronic or social conditions impacting patient care (Social Determinants of Health): None      Context: Doreen Stephenson is a 34 y.o. female with a medical history of hypothyroidism, Crohn's disease, anemia who presents to the ED c/o acute abdominal pain and concern for Crohn's flareup.  Patient diagnosed with Crohn's at age 19.  Patient currently on Skyrizi over the last 8 months.  Her last infusion was 4 weeks ago and she is getting it every 8 weeks.  Patient followed by Dr. Peña with GI.  Patient started on 20 mg of prednisone 2 or 3 days ago for Crohn's flare.  Despite this, patient passing nishant blood and having increased abdominal pain.  Patient with no vomiting or fevers.  Patient has had bowel resection for Crohn's in the remote past.  Saw her primary doctor fairly recently and had labs with worsening anemia.  Patient has required transfusion in the remote past as well.        Review of prior external notes (non-ED) -and- Review of prior external test results outside of this encounter: Seen by her primary provider on December 2 and had labs done then including iron studies.  Patient had hemoglobin of 8.5 at that time and that was a microcytic anemia.  Last CRP in system is from June when it was elevated at 2.7    Prescription drug monitoring program review:     N/A    PAST MEDICAL HISTORY  Active Ambulatory Problems     Diagnosis Date Noted    Crohn's disease of small intestine with rectal bleeding 12/12/2017    Crohn's disease of ileum 08/01/2014    Well adult exam 04/25/2022    Iron deficiency anemia due to chronic blood loss 04/25/2022    Vitamin D deficiency 04/25/2022    Vitamin B12 deficiency 04/25/2022    Recurrent major depression in partial remission  04/25/2022    Hypothyroidism due to acquired atrophy of thyroid 07/05/2023    Newly recognized heart murmur 12/04/2024    Iron adverse reaction 12/04/2024     Resolved Ambulatory Problems     Diagnosis Date Noted    Ventral hernia without obstruction or gangrene 08/10/2018    Metatarsalgia of right foot 07/13/2020    Abrasion, foot, right, initial encounter 07/13/2020    Injury of peroneal tendon of right foot 07/13/2020    Sprain of right ankle 07/13/2020    Diarrhea 04/02/2018    Hypokalemia 12/16/2017    Attention to ileostomy 12/12/2017    Subclinical hypothyroidism 04/25/2022     Past Medical History:   Diagnosis Date    Anemia     Anxiety     Crohn's disease     Depression     Incisional hernia     PONV (postoperative nausea and vomiting)     Ventral hernia          PAST SURGICAL HISTORY  Past Surgical History:   Procedure Laterality Date    COLON RESECTION SMALL BOWEL N/A 08/31/2017    Dr. Rockwell    COLON RESECTION WITH COLOSTOMY N/A 09/04/2017    Due to anastomosis infection-Dr. Rockwell    COLONOSCOPY N/A 2016    Dr. Bowser    ILEOSTOMY CLOSURE N/A 12/12/2017    Dr. Rockwell    VENTRAL/INCISIONAL HERNIA REPAIR N/A 9/10/2018    Procedure: VENTRAL/INCISIONAL HERNIA REPAIR WITH MESH AND COMPONENT SEPARATION;  Surgeon: Shawn Villasenor Jr., MD;  Location: [facility] MAIN OR;  Service: General    VENTRAL/INCISIONAL HERNIA REPAIR N/A 12/17/2018    Procedure: VENTRAL/INCISIONAL HERNIA REPAIR;  Surgeon: Shawn Villasenor Jr., MD;  Location: [facility] MAIN OR;  Service: General    WISDOM TOOTH EXTRACTION           FAMILY HISTORY  Family History   Problem Relation Age of Onset    Hypertension Father     High cholesterol Father     Skin cancer Maternal Grandmother     Cancer Maternal Grandfather         prostate    Diabetes Maternal Grandfather     Malig Hyperthermia Neg Hx          SOCIAL HISTORY  Social History     Socioeconomic History    Marital status: Single   Tobacco Use    Smoking status: Never    Smokeless tobacco:  Never   Vaping Use    Vaping status: Never Used   Substance and Sexual Activity    Alcohol use: No    Drug use: No    Sexual activity: Defer         ALLERGIES  Morphine        REVIEW OF SYSTEMS  Review of Systems  Included in HPI  All systems reviewed and negative except for those discussed in HPI.      PHYSICAL EXAM    I have reviewed the triage vital signs and nursing notes.    ED Triage Vitals [12/11/24 0850]   Temp Heart Rate Resp BP SpO2   98.3 °F (36.8 °C) 111 -- -- 100 %      Temp src Heart Rate Source Patient Position BP Location FiO2 (%)   Tympanic -- -- -- --       Physical Exam  GENERAL: Pleasant cooperative and conversant female, alert, no acute distress  SKIN: Warm, dry, generalized pallor  HENT: Normocephalic, atraumatic  EYES: no scleral icterus  CV: regular rhythm, regular rate, positive systolic murmur  RESPIRATORY: normal effort, lungs clear, no wheezing  ABDOMEN: soft, diffuse mid abdominal tenderness to palpation with no rebound and no guarding, nondistended  MUSCULOSKELETAL: no deformity  NEURO: alert, moves all extremities, follows commands                                                                   LAB RESULTS  Recent Results (from the past 24 hours)   Urinalysis With Microscopic If Indicated (No Culture) - Urine, Clean Catch    Collection Time: 12/11/24  9:34 AM    Specimen: Urine, Clean Catch   Result Value Ref Range    Color, UA Yellow Yellow, Straw    Appearance, UA Clear Clear    pH, UA 6.0 5.0 - 8.0    Specific Gravity, UA <=1.005 1.005 - 1.030    Glucose, UA Negative Negative    Ketones, UA Negative Negative    Bilirubin, UA Negative Negative    Blood, UA Negative Negative    Protein, UA Negative Negative    Leuk Esterase, UA Trace (A) Negative    Nitrite, UA Negative Negative    Urobilinogen, UA 0.2 E.U./dL 0.2 - 1.0 E.U./dL   Urinalysis, Microscopic Only - Urine, Clean Catch    Collection Time: 12/11/24  9:34 AM    Specimen: Urine, Clean Catch   Result Value Ref Range    RBC, UA  None Seen None Seen, 0-2 /HPF    WBC, UA 11-20 (A) None Seen, 0-2 /HPF    Bacteria, UA None Seen None Seen /HPF    Squamous Epithelial Cells, UA 0-2 None Seen, 0-2 /HPF    Hyaline Casts, UA None Seen None Seen /LPF    Methodology Automated Microscopy    Comprehensive Metabolic Panel    Collection Time: 12/11/24  9:38 AM    Specimen: Blood   Result Value Ref Range    Glucose 103 (H) 65 - 99 mg/dL    BUN 8 6 - 20 mg/dL    Creatinine 0.80 0.57 - 1.00 mg/dL    Sodium 137 136 - 145 mmol/L    Potassium 3.9 3.5 - 5.2 mmol/L    Chloride 105 98 - 107 mmol/L    CO2 22.3 22.0 - 29.0 mmol/L    Calcium 8.6 8.6 - 10.5 mg/dL    Total Protein 7.2 6.0 - 8.5 g/dL    Albumin 3.5 3.5 - 5.2 g/dL    ALT (SGPT) 13 1 - 33 U/L    AST (SGOT) 21 1 - 32 U/L    Alkaline Phosphatase 74 39 - 117 U/L    Total Bilirubin <0.2 0.0 - 1.2 mg/dL    Globulin 3.7 gm/dL    A/G Ratio 0.9 g/dL    BUN/Creatinine Ratio 10.0 7.0 - 25.0    Anion Gap 9.7 5.0 - 15.0 mmol/L    eGFR 98.7 >60.0 mL/min/1.73   hCG, Serum, Qualitative    Collection Time: 12/11/24  9:38 AM    Specimen: Blood   Result Value Ref Range    HCG Qualitative Negative Negative   Lipase    Collection Time: 12/11/24  9:38 AM    Specimen: Blood   Result Value Ref Range    Lipase 38 13 - 60 U/L   Lactic Acid, Plasma    Collection Time: 12/11/24  9:38 AM    Specimen: Blood   Result Value Ref Range    Lactate 1.9 0.5 - 2.0 mmol/L   Procalcitonin    Collection Time: 12/11/24  9:38 AM    Specimen: Blood   Result Value Ref Range    Procalcitonin 0.02 0.00 - 0.25 ng/mL   C-reactive Protein    Collection Time: 12/11/24  9:38 AM    Specimen: Blood   Result Value Ref Range    C-Reactive Protein <0.30 0.00 - 0.50 mg/dL   Magnesium    Collection Time: 12/11/24  9:38 AM    Specimen: Blood   Result Value Ref Range    Magnesium 1.9 1.6 - 2.6 mg/dL   Phosphorus    Collection Time: 12/11/24  9:38 AM    Specimen: Blood   Result Value Ref Range    Phosphorus 2.1 (L) 2.5 - 4.5 mg/dL   TSH    Collection Time: 12/11/24   9:38 AM    Specimen: Blood   Result Value Ref Range    TSH 2.320 0.270 - 4.200 uIU/mL   CBC Auto Differential    Collection Time: 12/11/24  9:38 AM    Specimen: Blood   Result Value Ref Range    WBC 6.62 3.40 - 10.80 10*3/mm3    RBC 3.73 (L) 3.77 - 5.28 10*6/mm3    Hemoglobin 8.0 (L) 12.0 - 15.9 g/dL    Hematocrit 29.0 (L) 34.0 - 46.6 %    MCV 77.7 (L) 79.0 - 97.0 fL    MCH 21.4 (L) 26.6 - 33.0 pg    MCHC 27.6 (L) 31.5 - 35.7 g/dL    RDW 15.6 (H) 12.3 - 15.4 %    RDW-SD 43.4 37.0 - 54.0 fl    MPV 9.6 6.0 - 12.0 fL    Platelets 327 140 - 450 10*3/mm3    Neutrophil % 90.7 (H) 42.7 - 76.0 %    Lymphocyte % 6.5 (L) 19.6 - 45.3 %    Monocyte % 1.7 (L) 5.0 - 12.0 %    Eosinophil % 0.2 (L) 0.3 - 6.2 %    Basophil % 0.6 0.0 - 1.5 %    Immature Grans % 0.3 0.0 - 0.5 %    Neutrophils, Absolute 6.01 1.70 - 7.00 10*3/mm3    Lymphocytes, Absolute 0.43 (L) 0.70 - 3.10 10*3/mm3    Monocytes, Absolute 0.11 0.10 - 0.90 10*3/mm3    Eosinophils, Absolute 0.01 0.00 - 0.40 10*3/mm3    Basophils, Absolute 0.04 0.00 - 0.20 10*3/mm3    Immature Grans, Absolute 0.02 0.00 - 0.05 10*3/mm3    nRBC 0.0 0.0 - 0.2 /100 WBC   Type & Screen    Collection Time: 12/11/24  9:38 AM    Specimen: Blood   Result Value Ref Range    ABO Type O     RH type Positive     Antibody Screen Negative     T&S Expiration Date 12/14/2024 11:59:59 PM    Light Blue Top    Collection Time: 12/11/24  9:38 AM   Result Value Ref Range    Extra Tube Hold for add-ons.          RADIOLOGY  CT Abdomen Pelvis With Contrast    Result Date: 12/11/2024  CT ABDOMEN PELVIS W CONTRAST-  INDICATION: Abdominal pain, rectal bleeding  COMPARISON: CT abdomen pelvis February 12, 2023  TECHNIQUE: Routine CT abdomen/pelvis with IV contrast. Coronal and sagittal reformats. Radiation dose reduction techniques were utilized, including automated exposure control and exposure modulation based on body size.  FINDINGS:  Lung bases: Unremarkable.  ABDOMEN: Normal liver, gallbladder, spleen, pancreas,  adrenal glands and kidneys.  Pelvis: Normal bladder. No bladder calculus. Anteverted uterus. No adnexal mass. Small amount of free fluid seen in the cul-de-sac, may be physiologic.  Bowel: No obstruction. Question some right perianal scarring. Rectum is predominantly collapsed. Right hemicolectomy. Prominent vasa recta adjacent to the sigmoid colon, with the sigmoid colon appearing predominantly collapsed. No extraluminal gas. No pericolonic fluid collection.  Abdominal wall: Abdominal and pelvic wall scarring.  Retroperitoneum: No lymphadenopathy.  Vasculature: No abdominal aortic aneurysm. Vessels appear patent.  Osseous structures: No destructive osseous lesions.       1. Prominent vasa recta adjacent to the sigmoid colon, may indicate some underlying inflammation, with the sigmoid colon otherwise appearing collapsed. 2. Question some right perianal scarring which could be sequela of old fistulous disease. 3. Right hemicolectomy. No obstruction.  This report was finalized on 12/11/2024 11:46 AM by Dr. Joby Dennis M.D on Workstation: [id number]         MEDICATIONS GIVEN IN ER  Medications   sodium chloride 0.9 % flush 10 mL (has no administration in time range)   fentaNYL citrate (PF) (SUBLIMAZE) injection 50 mcg (50 mcg Intravenous Given 12/11/24 1026)   ondansetron (ZOFRAN) injection 4 mg (4 mg Intravenous Given 12/11/24 1025)   sodium chloride 0.9 % bolus 500 mL (0 mL Intravenous Stopped 12/11/24 1130)   iopamidol (ISOVUE-300) 61 % injection 100 mL (85 mL Intravenous Given by Other 12/11/24 1123)         ORDERS PLACED DURING THIS VISIT:  Orders Placed This Encounter   Procedures    Gastrointestinal Panel, PCR - Stool, Per Rectum    CT Abdomen Pelvis With Contrast    Comprehensive Metabolic Panel    hCG, Serum, Qualitative    Urinalysis With Microscopic If Indicated (No Culture) - Urine, Clean Catch    Lipase    Lactic Acid, Plasma    Procalcitonin    C-reactive Protein    Magnesium    Phosphorus    TSH     CBC Auto Differential    Urinalysis, Microscopic Only - Urine, Clean Catch    Type & Screen    Insert Peripheral IV    Initiate ED Observation Status    CBC & Differential    Extra Tubes    Light Blue Top         OUTPATIENT MEDICATION MANAGEMENT:  Current Facility-Administered Medications Ordered in Epic   Medication Dose Route Frequency Provider Last Rate Last Admin    sodium chloride 0.9 % flush 10 mL  10 mL Intravenous PRN Tammy Capellan MD         Current Outpatient Medications Ordered in Epic   Medication Sig Dispense Refill    Cyanocobalamin (VITAMIN B 12 PO) Take 1 tablet by mouth Daily.      levothyroxine (SYNTHROID, LEVOTHROID) 25 MCG tablet Take 1 tablet by mouth Daily. 90 tablet 1    Risankizumab-rzaa (Skyrizi) 180 MG/1.2ML solution cartridge Inject 180 mg under the skin into the appropriate area as directed Every 8 (Eight) Weeks. 1.2 mL 2    venlafaxine XR (EFFEXOR-XR) 75 MG 24 hr capsule Take 3 capsules by mouth Daily. 270 capsule 1    VITAMIN D PO Take 4,000 Units by mouth Daily.           PROCEDURES  Procedures            PROGRESS, DATA ANALYSIS, CONSULTS, AND MEDICAL DECISION MAKING  All labs have been independently interpreted by me.  All radiology studies have been reviewed by me. All EKG's have been independently viewed and interpreted by me.  Discussion below represents my analysis of pertinent findings related to patient's condition, differential diagnosis, treatment plan and final disposition.    The differential diagnosis for this patient includes: appendicitis, pancreatitis, cholecystitis/biliary colic, PUD, gastritis, pneumonia, ureteral stone, sbo, hernia, colitis, diverticulitis, AAA, malignancy, gastroenteritis, food intolerances. Abdominal exam is without peritoneal signs. There is no evidence of acute abdomen at this time. I have a low suspicion for vascular catastrophe, bowel obstruction or viscus perforation. This patient´s presentation is most consistent with Crohn's flare  up with lower GI bleeding.  Plan serum labs occluding type and screen and hCG as well as inflammatory markers, urinalysis, pain control, IMAGING CT abdomen and pelvis, and serial reassessment.          ED Course as of 12/11/24 1330   Wed Dec 11, 2024   1316 I discussed all results with patient.  She is amenable to observation admission for GI consultation and stool studies. [AR]   1329 I discussed patient's case with Linda our provider in the observation unit who is amenable to admitting to Dr. Orozco's [AR]      ED Course User Index  [AR] aTmmy Capellan MD             AS OF 13:30 EST VITALS:    BP - 121/76  HR - 72  TEMP - 98.3 °F (36.8 °C) (Tympanic)  O2 SATS - 97%      COMPLEXITY OF CARE  The patient requires admission.    DIAGNOSIS  Final diagnoses:   Lower GI bleeding   Generalized abdominal pain   Crohn's disease with complication, unspecified gastrointestinal tract location   Anemia, unspecified type         DISPOSITION  ED Disposition       ED Disposition   Decision to Admit    Condition   --    Comment   ED obs unit                Please note that portions of this document were completed with a voice recognition program.    Note Disclaimer: At [facility], we believe that sharing information builds trust and better relationships. You are receiving this note because you recently visited [facility]. It is possible you will see health information before a provider has talked with you about it. This kind of information can be easy to misunderstand. To help you fully understand what it means for your health, we urge you to discuss this note with your provider.         Tammy Capellan MD  12/11/24 1330

## 2024-12-11 NOTE — PLAN OF CARE
Goal Outcome Evaluation:  Plan of Care Reviewed With: patient        Progress: no change  Outcome Evaluation: 35yo female pt came in duet to abdominal pain and blood in the stool that started over the weekend. VSS, hooked to cardiac monitor, stable on room air, AO4, up ad esthela. Clear liquid diet started. Pt complaining of headache and midabdominal pain. PRN pain meds given. To start bowel prep in the evening for planned colonosocpy tomorrow.

## 2024-12-11 NOTE — CONSULTS
Gastroenterology   Initial Inpatient Consult Note  Thank you for asking opinion on this patient.  Referring Provider: Arthur Christianson MD    Reason for Consultation: Crohn's disease    Subjective     History of present illness:    34 y.o. female with a history of Crohn disease that we are asked to see for worsening symptoms.  She apparently was diagnosed with Crohn's disease in her early 20s.  She has been on several Biologics including Stelara and is currently on Skyrizi.  She had a prior right hemicolectomy with ileal resection with temporary ileostomy by Dr. Rockwell.  She had her last colonoscopy in June of this year and was told that her Crohn disease was in remission.  She complains primarily of mid abdominal pain but also increasing frequency and volume of rectal bleeding.  She also has chronic diarrhea.  Before admission, she was complaining of being weak and fatigued and hemoglobin in the ER today is 8.  She has been on steroids intermittently in the past for flares.    Past Medical History:  Past Medical History:   Diagnosis Date    Anemia     Anxiety     Crohn's disease     Depression     Incisional hernia     PONV (postoperative nausea and vomiting)     USED PATCH PREVIOUSLY    Ventral hernia      Past Surgical History:  Past Surgical History:   Procedure Laterality Date    COLON RESECTION SMALL BOWEL N/A 08/31/2017    Dr. Rockwell    COLON RESECTION WITH COLOSTOMY N/A 09/04/2017    Due to anastomosis infection-Dr. Rockwell    COLONOSCOPY N/A 2016    Dr. Bowser    ILEOSTOMY CLOSURE N/A 12/12/2017    Dr. Rockwell    VENTRAL/INCISIONAL HERNIA REPAIR N/A 9/10/2018    Procedure: VENTRAL/INCISIONAL HERNIA REPAIR WITH MESH AND COMPONENT SEPARATION;  Surgeon: Shawn Villasenor Jr., MD;  Location: [facility] OR;  Service: General    VENTRAL/INCISIONAL HERNIA REPAIR N/A 12/17/2018    Procedure: VENTRAL/INCISIONAL HERNIA REPAIR;  Surgeon: Shawn Villasenor Jr., MD;  Location: [facility] OR;  Service: General    [facility]        Social History:   Social History     Tobacco Use    Smoking status: Never    Smokeless tobacco: Never   Substance Use Topics    Alcohol use: No      Family History:  Family History   Problem Relation Age of Onset    Hypertension Father     High cholesterol Father     Skin cancer Maternal Grandmother     Cancer Maternal Grandfather         prostate    Diabetes Maternal Grandfather     Malig Hyperthermia Neg Hx        Home Meds:  (Not in a hospital admission)    Current Meds:   methylPREDNISolone sodium succinate, 125 mg, Intravenous, Once  sodium chloride, 10 mL, Intravenous, Q12H      Allergies:  Allergies   Allergen Reactions    Morphine Rash     RASH AROUND IV SITE     Review of Systems  Pertinent items are noted in HPI, all other systems reviewed and negative    Objective     Vital Signs  Temp:  [98.3 °F (36.8 °C)] 98.3 °F (36.8 °C)  Heart Rate:  [] 72  Resp:  [18] 18  BP: (121-134)/(76-95) 121/76    Physical Exam:  CONSTITUTIONAL:  today's vital signs reviewed  EARS NOSE THROAT: trachea midline and no deformity of the nares  EYES: no scleral icterus  GASTROINTESTINAL: abdomen is soft, nontender, nondistended with normal active bowel sounds, no masses are appreciated  PSYCHIATRIC: appropriate mood and affect  RESPIRATORY: normal inspiratory effort with no increased work of breathing  NEUROLOGIC: patient is awake and alert  DERMATOLOGIC: skin is warm with no cyanosis  LYMPHATIC: no periumbilical lymphadenopathy     Results Review:   I reviewed the patient's new clinical results.    Results from last 7 days   Lab Units 12/11/24  0938   WBC 10*3/mm3 6.62   HEMOGLOBIN g/dL 8.0*   HEMATOCRIT % 29.0*   PLATELETS 10*3/mm3 327     Results from last 7 days   Lab Units 12/11/24  0938   SODIUM mmol/L 137   POTASSIUM mmol/L 3.9   CHLORIDE mmol/L 105   CO2 mmol/L 22.3   BUN mg/dL 8   CREATININE mg/dL 0.80   CALCIUM mg/dL 8.6   BILIRUBIN mg/dL <0.2   ALK PHOS U/L 74   ALT (SGPT) U/L 13   AST  (SGOT) U/L 21   GLUCOSE mg/dL 103*         Lab Results   Lab Value Date/Time    LIPASE 38 12/11/2024 0938    LIPASE 52 06/10/2024 1359    LIPASE 35 06/06/2024 1233    LIPASE 28 05/07/2024 1308    LIPASE 34 03/20/2024 0727    LIPASE 71 (H) 12/28/2023 0802    LIPASE 32 07/25/2023 0732    LIPASE 61 (H) 02/12/2023 0422    LIPASE 32 10/13/2022 0950    LIPASE 37 08/11/2022 0739    LIPASE 16 07/25/2019 0758    LIPASE 50 04/19/2019 1658    LIPASE 35 02/11/2019 1838    LIPASE 33 11/10/2015 2016    LIPASE 19 04/25/2015 1358    LIPASE 18 08/04/2014 0600       Radiology:  CT Abdomen Pelvis With Contrast   Final Result       1. Prominent vasa recta adjacent to the sigmoid colon, may indicate some   underlying inflammation, with the sigmoid colon otherwise appearing   collapsed.   2. Question some right perianal scarring which could be sequela of old   fistulous disease.   3. Right hemicolectomy. No obstruction.       This report was finalized on 12/11/2024 11:46 AM by Dr. Joby Dennis M.D on Workstation: [id number]              Assessment & Plan   Active Hospital Problems    Diagnosis     **Abdominal pain        Assessment:  Crohn's disease.  CT today shows questionable inflammatory changes around the sigmoid colon.  She has ongoing bleeding with blood loss anemia.  Blood loss anemia  Status post right hemicolectomy  Occasional nausea    Plan:  We will proceed with colonoscopy tomorrow for evaluation of the extent and severity of her Crohn's disease currently.  Start IV steroids  Monitor H&H  Patient will follow-up with her primary gastroenterologist, Dr. Peña after discharge      I discussed the patients findings and my recommendations with patient and nursing staff.    MD Stephon Blas M.D.  [facility] [place] [address]  Office: [phone]

## 2024-12-11 NOTE — H&P
[facility]   HISTORY AND PHYSICAL    Patient Name: Doreen Stephenson  : 1990  MRN: [id number]  Primary Care Physician:  Chano Ortiz MD  Date of admission: 2024    Subjective   Subjective     Chief Complaint:   Chief Complaint   Patient presents with    Black or Bloody Stool    Abdominal Pain         HPI:    Doreen Stephenson is a pleasant afebrile 34 y.o.  female with a past medical history including but not limited to iron deficiency anemia and Crohn disease.    She presents to the emergency department at [facility] today with complaint of abdominal pain and hematochezia.  She has been admitted to the ED observation unit for further testing and evaluation.    Patient states that over the last month she has had maroon-colored blood intermittently in her stool.  States it was worse this morning when she went to have a bowel movement she had liquid stool that felt as though she were urinating from her rectum.  She states at some point over the last month she has been passing clots rectally.  Endorses lower abdominal cramping.  States she takes Bentyl at home with some relief.  She denies any fever or chills.    She has a longstanding history of Crohn disease previously on Stelara and Remicade but reports she developed antibodies to these.  Received last treatment of Skyrizi 5 weeks ago which she reports she gets every 8 weeks.  She has been on this regimen for the last 8 months.    He is scheduled to get an iron infusion on Friday, she had 1 years ago which she tolerated well.    Review of Systems   All systems were reviewed and negative except for: What is mentioned above    Personal History     Past Medical History:   Diagnosis Date    Anemia     Anxiety     Crohn's disease     Depression     Incisional hernia     PONV (postoperative nausea and vomiting)     USED PATCH PREVIOUSLY    Ventral hernia        Past Surgical History:   Procedure Laterality Date    COLON  RESECTION SMALL BOWEL N/A 08/31/2017    Dr. Rockwell    COLON RESECTION WITH COLOSTOMY N/A 09/04/2017    Due to anastomosis infection-Dr. Rockwell    COLONOSCOPY N/A 2016    Dr. Bowser    ILEOSTOMY CLOSURE N/A 12/12/2017    Dr. Rockwell    VENTRAL/INCISIONAL HERNIA REPAIR N/A 9/10/2018    Procedure: VENTRAL/INCISIONAL HERNIA REPAIR WITH MESH AND COMPONENT SEPARATION;  Surgeon: Shawn Villasenor Jr., MD;  Location: [facility] OR;  Service: General    VENTRAL/INCISIONAL HERNIA REPAIR N/A 12/17/2018    Procedure: VENTRAL/INCISIONAL HERNIA REPAIR;  Surgeon: Shawn Villasenor Jr., MD;  Location: [facility] OR;  Service: General    WISDOM TOOTH EXTRACTION         Family History: family history includes Cancer in her maternal grandfather; Diabetes in her maternal grandfather; High cholesterol in her father; Hypertension in her father; Skin cancer in her maternal grandmother. Otherwise pertinent FHx was reviewed and not pertinent to current issue.    Social History:  reports that she has never smoked. She has never used smokeless tobacco. She reports that she does not drink alcohol and does not use drugs.    Home Medications:  Cyanocobalamin, Risankizumab-rzaa, Vitamin D, levothyroxine, and venlafaxine XR    Allergies:  Allergies   Allergen Reactions    Morphine Rash     RASH AROUND IV SITE       Objective   Objective     Vitals:   Temp:  [98.3 °F (36.8 °C)-98.4 °F (36.9 °C)] 98.4 °F (36.9 °C)  Heart Rate:  [] 75  Resp:  [17-18] 17  BP: (121-143)/(76-95) 132/81  Physical Exam    Constitutional: Awake, alert   Eyes: PERRLA, sclerae anicteric, no conjunctival injection   HENT: NCAT, mucous membranes moist   Neck: Supple, no thyromegaly, no lymphadenopathy, trachea midline   Respiratory: Clear to auscultation bilaterally, nonlabored respirations    Cardiovascular: RRR, no murmurs, rubs, or gallops, palpable pedal pulses bilaterally   Gastrointestinal: Positive bowel sounds, soft, nontender, nondistended   Musculoskeletal: No  bilateral ankle edema, no clubbing or cyanosis to extremities   Psychiatric: Appropriate affect, cooperative   Neurologic: Oriented x 3, strength symmetric in all extremities, Cranial Nerves grossly intact to confrontation, speech clear   Skin: No rashes, mild generalized pallor    Result Review    Result Review:  I have personally reviewed the results from the time of this admission to 12/11/2024 17:39 EST and agree with these findings:  [x]  Laboratory list / accordion  []  Microbiology  [x]  Radiology  []  EKG/Telemetry   []  Cardiology/Vascular   []  Pathology  []  Old records  []  Other:  Most notable findings include: Iron panel shows low iron level and low iron saturation, hemoglobin 8.0, hemoglobin on 12/2 was 8.5.  Type and screen performed.      Assessment & Plan   Assessment / Plan     Brief Patient Summary:  Doreen Stephenson is a 34 y.o. female who is being evaluated for blood in stool for the last month with history of Crohn disease.    Active Hospital Problems:  Active Hospital Problems    Diagnosis     **Abdominal pain     Crohn's disease of large intestine with rectal bleeding      Plan:     Abdominal pain  Crohn disease  Hematochezia  Clear liquid diet, n.p.o. after midnight  Consult to GI  Mercer and Bentyl as needed for abdominal pain  GI panel pending      Iron deficiency anemia  Ganzoni equation shows patient has a 1,297 mg total iron deficit  Ferric gluconate 250 mg IV ordered  Hemoglobin 8.0, trend with a.m. labs    VTE Prophylaxis:  Mechanical VTE prophylaxis orders are present.        CODE STATUS:    Level Of Support Discussed With: Patient  Code Status (Patient has no pulse and is not breathing): CPR (Attempt to Resuscitate)  Medical Interventions (Patient has pulse or is breathing): Full Support    Admission Status:  I believe this patient meets observation status.    Electronically signed by JET Moore, 12/11/24, 5:39 PM EST.        80 minutes has been spent by [facility] providers in the care of this patient while under observation status      I have worn appropriate PPE during this patient encounter, sanitized my hands both with entering and exiting patient's room.

## 2024-12-11 NOTE — ED NOTES
Nursing report ED to floor  Doreen Stephenson  34 y.o.  female    HPI :  HPI  Stated Reason for Visit: increase of blood in stool, liquid stool and increase of sharp abdominal pain since the weekend. symptoms present for 1 month  History Obtained From: patient    Chief Complaint  Chief Complaint   Patient presents with    Black or Bloody Stool    Abdominal Pain       Admitting doctor:   Arthur Christianson MD    Admitting diagnosis:   The primary encounter diagnosis was Lower GI bleeding. Diagnoses of Generalized abdominal pain, Crohn's disease with complication, unspecified gastrointestinal tract location, and Anemia, unspecified type were also pertinent to this visit.    Code status:   Current Code Status       Date Active Code Status Order ID Comments User Context       12/11/2024 1331 CPR (Attempt to Resuscitate) [id number]  Linda Rodas APRN ED        Question Answer    Code Status (Patient has no pulse and is not breathing) CPR (Attempt to Resuscitate)    Medical Interventions (Patient has pulse or is breathing) Full Support    Level Of Support Discussed With Patient                    Allergies:   Morphine    Isolation:   No active isolations    Intake and Output    Intake/Output Summary (Last 24 hours) at 12/11/2024 1402  Last data filed at 12/11/2024 1130  Gross per 24 hour   Intake 500 ml   Output --   Net 500 ml       Weight:       12/11/24  0930   Weight: 81.6 kg (180 lb)       Most recent vitals:   Vitals:    12/11/24 1131 12/11/24 1218 12/11/24 1231 12/11/24 1331   BP: 121/79  121/76 143/84   Pulse: 76 72  77   Resp:       Temp:       TempSrc:       SpO2: 98% 97%  99%   Weight:       Height:           Active LDAs/IV Access:   Lines, Drains & Airways       Active LDAs       Name Placement date Placement time Site Days    Peripheral IV 02/12/23 0340 Left Antecubital 02/12/23  0340  Antecubital  668    Peripheral IV 12/11/24 0942 Right Antecubital 12/11/24  0942  Antecubital  less than 1                     Labs (abnormal labs have a star):   Labs Reviewed   COMPREHENSIVE METABOLIC PANEL - Abnormal; Notable for the following components:       Result Value    Glucose 103 (*)     All other components within normal limits    Narrative:     GFR Categories in Chronic Kidney Disease (CKD)      GFR Category          GFR (mL/min/1.73)    Interpretation  G1                     90 or greater         Normal or high (1)  G2                      60-89                Mild decrease (1)  G3a                   45-59                Mild to moderate decrease  G3b                   30-44                Moderate to severe decrease  G4                    15-29                Severe decrease  G5                    14 or less           Kidney failure          (1)In the absence of evidence of kidney disease, neither GFR category G1 or G2 fulfill the criteria for CKD.    eGFR calculation 2021 CKD-EPI creatinine equation, which does not include race as a factor   URINALYSIS W/ MICROSCOPIC IF INDICATED (NO CULTURE) - Abnormal; Notable for the following components:    Leuk Esterase, UA Trace (*)     All other components within normal limits   PHOSPHORUS - Abnormal; Notable for the following components:    Phosphorus 2.1 (*)     All other components within normal limits   CBC WITH AUTO DIFFERENTIAL - Abnormal; Notable for the following components:    RBC 3.73 (*)     Hemoglobin 8.0 (*)     Hematocrit 29.0 (*)     MCV 77.7 (*)     MCH 21.4 (*)     MCHC 27.6 (*)     RDW 15.6 (*)     Neutrophil % 90.7 (*)     Lymphocyte % 6.5 (*)     Monocyte % 1.7 (*)     Eosinophil % 0.2 (*)     Lymphocytes, Absolute 0.43 (*)     All other components within normal limits   URINALYSIS, MICROSCOPIC ONLY - Abnormal; Notable for the following components:    WBC, UA 11-20 (*)     All other components within normal limits   HCG, SERUM, QUALITATIVE - Normal   LIPASE - Normal   LACTIC ACID, PLASMA - Normal   PROCALCITONIN - Normal    Narrative:     As a Marker for  "Sepsis (Non-Neonates):    1. <0.5 ng/mL represents a low risk of severe sepsis and/or septic shock.  2. >2 ng/mL represents a high risk of severe sepsis and/or septic shock.    As a Marker for Lower Respiratory Tract Infections that require antibiotic therapy:    PCT on Admission    Antibiotic Therapy       6-12 Hrs later    >0.5                Strongly Recommended  >0.25 - <0.5        Recommended   0.1 - 0.25          Discouraged              Remeasure/reassess PCT  <0.1                Strongly Discouraged     Remeasure/reassess PCT    As 28 day mortality risk marker: \"Change in Procalcitonin Result\" (>80% or <=80%) if Day 0 (or Day 1) and Day 4 values are available. Refer to http://www.[employer]pct-calculator.com    Change in PCT <=80%  A decrease of PCT levels below or equal to 80% defines a positive change in PCT test result representing a higher risk for 28-day all-cause mortality of patients diagnosed with severe sepsis for septic shock.    Change in PCT >80%  A decrease of PCT levels of more than 80% defines a negative change in PCT result representing a lower risk for 28-day all-cause mortality of patients diagnosed with severe sepsis or septic shock.      C-REACTIVE PROTEIN - Normal   MAGNESIUM - Normal   TSH - Normal   GASTROINTESTINAL PANEL, PCR (PREFERRED) DOES NOT INCLUDE CDIFF   HEMOGLOBIN AND HEMATOCRIT, BLOOD   TYPE AND SCREEN   CBC AND DIFFERENTIAL    Narrative:     The following orders were created for panel order CBC & Differential.  Procedure                               Abnormality         Status                     ---------                               -----------         ------                     CBC Auto Differential[[id number]]        Abnormal            Final result                 Please view results for these tests on the individual orders.   EXTRA TUBES    Narrative:     The following orders were created for panel order Extra Tubes.  Procedure                               Abnormality   "       Status                     ---------                               -----------         ------                     Light Blue Top[[id number]]                                   Final result                 Please view results for these tests on the individual orders.   LIGHT BLUE TOP       EKG:   No orders to display       Meds given in ED:   Medications   sodium chloride 0.9 % flush 10 mL (has no administration in time range)   sodium chloride 0.9 % flush 10 mL (has no administration in time range)   sodium chloride 0.9 % flush 10 mL (has no administration in time range)   sodium chloride 0.9 % infusion 40 mL (has no administration in time range)   ondansetron ODT (ZOFRAN-ODT) disintegrating tablet 4 mg (has no administration in time range)     Or   ondansetron (ZOFRAN) injection 4 mg (has no administration in time range)   sennosides-docusate (PERICOLACE) 8.6-50 MG per tablet 2 tablet (has no administration in time range)     And   polyethylene glycol (MIRALAX) packet 17 g (has no administration in time range)     And   bisacodyl (DULCOLAX) EC tablet 5 mg (has no administration in time range)     And   bisacodyl (DULCOLAX) suppository 10 mg (has no administration in time range)   methylPREDNISolone sodium succinate (SOLU-Medrol) injection 125 mg (has no administration in time range)   fentaNYL citrate (PF) (SUBLIMAZE) injection 50 mcg (50 mcg Intravenous Given 12/11/24 1026)   ondansetron (ZOFRAN) injection 4 mg (4 mg Intravenous Given 12/11/24 1025)   sodium chloride 0.9 % bolus 500 mL (0 mL Intravenous Stopped 12/11/24 1130)   iopamidol (ISOVUE-300) 61 % injection 100 mL (85 mL Intravenous Given by Other 12/11/24 1123)       Imaging results:  CT Abdomen Pelvis With Contrast    Result Date: 12/11/2024   1. Prominent vasa recta adjacent to the sigmoid colon, may indicate some underlying inflammation, with the sigmoid colon otherwise appearing collapsed. 2. Question some right perianal scarring which could be  sequela of old fistulous disease. 3. Right hemicolectomy. No obstruction.  This report was finalized on 12/11/2024 11:46 AM by Dr. Joby Dennis M.D on Workstation: [id number]       Ambulatory status:   - up ad esthela      Social issues:   Social History     Socioeconomic History    Marital status: Single   Tobacco Use    Smoking status: Never    Smokeless tobacco: Never   Vaping Use    Vaping status: Never Used   Substance and Sexual Activity    Alcohol use: No    Drug use: No    Sexual activity: Defer       Peripheral Neurovascular  Peripheral Neurovascular (Adult)  Peripheral Neurovascular WDL: WDL    Neuro Cognitive  Neuro Cognitive (Adult)  Cognitive/Neuro/Behavioral WDL: WDL    Learning  Learning Assessment  Learning Readiness and Ability: no barriers identified    Respiratory  Respiratory WDL  Respiratory WDL: WDL    Abdominal Pain       Pain Assessments  Pain (Adult)  (0-10) Pain Rating: Rest: 0  (0-10) Pain Rating: Activity: 0  Pain Location: abdomen  Pain Side/Orientation: lower  Pain Description: sharp  Response to Pain Interventions: interventions effective per patient    NIH Stroke Scale       Bree Victor RN  12/11/24 14:02 EST

## 2024-12-12 ENCOUNTER — ANESTHESIA (OUTPATIENT)
Dept: GASTROENTEROLOGY | Facility: HOSPITAL | Age: 34
End: 2024-12-12
Payer: COMMERCIAL

## 2024-12-12 ENCOUNTER — ANESTHESIA EVENT (OUTPATIENT)
Dept: GASTROENTEROLOGY | Facility: HOSPITAL | Age: 34
End: 2024-12-12
Payer: COMMERCIAL

## 2024-12-12 LAB
ALBUMIN SERPL-MCNC: 3.8 G/DL (ref 3.5–5.2)
ALBUMIN/GLOB SERPL: 1.3 G/DL
ALP SERPL-CCNC: 69 U/L (ref 39–117)
ALT SERPL W P-5'-P-CCNC: 15 U/L (ref 1–33)
ANION GAP SERPL CALCULATED.3IONS-SCNC: 12 MMOL/L (ref 5–15)
AST SERPL-CCNC: 20 U/L (ref 1–32)
BASOPHILS # BLD AUTO: 0.02 10*3/MM3 (ref 0–0.2)
BASOPHILS NFR BLD AUTO: 0.3 % (ref 0–1.5)
BILIRUB SERPL-MCNC: 0.2 MG/DL (ref 0–1.2)
BUN SERPL-MCNC: 7 MG/DL (ref 6–20)
BUN/CREAT SERPL: 10.6 (ref 7–25)
CALCIUM SPEC-SCNC: 9 MG/DL (ref 8.6–10.5)
CHLORIDE SERPL-SCNC: 105 MMOL/L (ref 98–107)
CO2 SERPL-SCNC: 22 MMOL/L (ref 22–29)
CREAT SERPL-MCNC: 0.66 MG/DL (ref 0.57–1)
DEPRECATED RDW RBC AUTO: 40.5 FL (ref 37–54)
EGFRCR SERPLBLD CKD-EPI 2021: 117.5 ML/MIN/1.73
EOSINOPHIL # BLD AUTO: 0 10*3/MM3 (ref 0–0.4)
EOSINOPHIL NFR BLD AUTO: 0 % (ref 0.3–6.2)
ERYTHROCYTE [DISTWIDTH] IN BLOOD BY AUTOMATED COUNT: 15 % (ref 12.3–15.4)
GLOBULIN UR ELPH-MCNC: 3 GM/DL
GLUCOSE SERPL-MCNC: 112 MG/DL (ref 65–99)
HCT VFR BLD AUTO: 29.5 % (ref 34–46.6)
HGB BLD-MCNC: 8.6 G/DL (ref 12–15.9)
IMM GRANULOCYTES # BLD AUTO: 0.04 10*3/MM3 (ref 0–0.05)
IMM GRANULOCYTES NFR BLD AUTO: 0.5 % (ref 0–0.5)
LYMPHOCYTES # BLD AUTO: 0.71 10*3/MM3 (ref 0.7–3.1)
LYMPHOCYTES NFR BLD AUTO: 9.2 % (ref 19.6–45.3)
MCH RBC QN AUTO: 21.7 PG (ref 26.6–33)
MCHC RBC AUTO-ENTMCNC: 29.2 G/DL (ref 31.5–35.7)
MCV RBC AUTO: 74.5 FL (ref 79–97)
MONOCYTES # BLD AUTO: 0.32 10*3/MM3 (ref 0.1–0.9)
MONOCYTES NFR BLD AUTO: 4.1 % (ref 5–12)
NEUTROPHILS NFR BLD AUTO: 6.65 10*3/MM3 (ref 1.7–7)
NEUTROPHILS NFR BLD AUTO: 85.9 % (ref 42.7–76)
PLATELET # BLD AUTO: 330 10*3/MM3 (ref 140–450)
PMV BLD AUTO: 9.3 FL (ref 6–12)
POTASSIUM SERPL-SCNC: 4 MMOL/L (ref 3.5–5.2)
PROT SERPL-MCNC: 6.8 G/DL (ref 6–8.5)
RBC # BLD AUTO: 3.96 10*6/MM3 (ref 3.77–5.28)
SODIUM SERPL-SCNC: 139 MMOL/L (ref 136–145)
WBC NRBC COR # BLD AUTO: 7.74 10*3/MM3 (ref 3.4–10.8)

## 2024-12-12 PROCEDURE — G0378 HOSPITAL OBSERVATION PER HR: HCPCS

## 2024-12-12 PROCEDURE — 25010000002 LIDOCAINE 2% SOLUTION: Performed by: NURSE ANESTHETIST, CERTIFIED REGISTERED

## 2024-12-12 PROCEDURE — 88342 IMHCHEM/IMCYTCHM 1ST ANTB: CPT | Performed by: INTERNAL MEDICINE

## 2024-12-12 PROCEDURE — 25010000002 PROPOFOL 1000 MG/100ML EMULSION: Performed by: NURSE ANESTHETIST, CERTIFIED REGISTERED

## 2024-12-12 PROCEDURE — 85025 COMPLETE CBC W/AUTO DIFF WBC: CPT | Performed by: NURSE PRACTITIONER

## 2024-12-12 PROCEDURE — 88305 TISSUE EXAM BY PATHOLOGIST: CPT | Performed by: INTERNAL MEDICINE

## 2024-12-12 PROCEDURE — 80053 COMPREHEN METABOLIC PANEL: CPT | Performed by: NURSE PRACTITIONER

## 2024-12-12 PROCEDURE — 25010000002 PROPOFOL 10 MG/ML EMULSION: Performed by: NURSE ANESTHETIST, CERTIFIED REGISTERED

## 2024-12-12 PROCEDURE — 45380 COLONOSCOPY AND BIOPSY: CPT | Performed by: INTERNAL MEDICINE

## 2024-12-12 PROCEDURE — 25810000003 SODIUM CHLORIDE 0.9 % SOLUTION: Performed by: INTERNAL MEDICINE

## 2024-12-12 RX ORDER — PROPOFOL 10 MG/ML
VIAL (ML) INTRAVENOUS AS NEEDED
Status: DISCONTINUED | OUTPATIENT
Start: 2024-12-12 | End: 2024-12-12 | Stop reason: SURG

## 2024-12-12 RX ORDER — PROPOFOL 10 MG/ML
INJECTION, EMULSION INTRAVENOUS CONTINUOUS PRN
Status: DISCONTINUED | OUTPATIENT
Start: 2024-12-12 | End: 2024-12-12 | Stop reason: SURG

## 2024-12-12 RX ORDER — LIDOCAINE HYDROCHLORIDE 20 MG/ML
INJECTION, SOLUTION INFILTRATION; PERINEURAL AS NEEDED
Status: DISCONTINUED | OUTPATIENT
Start: 2024-12-12 | End: 2024-12-12 | Stop reason: SURG

## 2024-12-12 RX ORDER — SODIUM CHLORIDE 9 MG/ML
30 INJECTION, SOLUTION INTRAVENOUS CONTINUOUS PRN
Status: ACTIVE | OUTPATIENT
Start: 2024-12-12 | End: 2024-12-12

## 2024-12-12 RX ADMIN — HYDROCODONE BITARTRATE AND ACETAMINOPHEN 1 TABLET: 7.5; 325 TABLET ORAL at 18:07

## 2024-12-12 RX ADMIN — Medication 10 ML: at 20:35

## 2024-12-12 RX ADMIN — SODIUM CHLORIDE 30 ML/HR: 9 INJECTION, SOLUTION INTRAVENOUS at 10:49

## 2024-12-12 RX ADMIN — LIDOCAINE HYDROCHLORIDE 50 MG: 20 INJECTION, SOLUTION INFILTRATION; PERINEURAL at 11:39

## 2024-12-12 RX ADMIN — PROPOFOL INJECTABLE EMULSION 180 MCG/KG/MIN: 10 INJECTION, EMULSION INTRAVENOUS at 11:39

## 2024-12-12 RX ADMIN — PROPOFOL 100 MG: 10 INJECTION, EMULSION INTRAVENOUS at 11:39

## 2024-12-12 RX ADMIN — PROPOFOL 60 MG: 10 INJECTION, EMULSION INTRAVENOUS at 11:41

## 2024-12-12 RX ADMIN — Medication 10 ML: at 09:05

## 2024-12-12 RX ADMIN — DICYCLOMINE HYDROCHLORIDE 10 MG: 10 CAPSULE ORAL at 20:34

## 2024-12-12 RX ADMIN — POLYETHYLENE GLYCOL 3350 0.5 BOTTLE: 17 POWDER, FOR SOLUTION ORAL at 04:15

## 2024-12-12 RX ADMIN — DICYCLOMINE HYDROCHLORIDE 10 MG: 10 CAPSULE ORAL at 18:07

## 2024-12-12 NOTE — PROGRESS NOTES
Patient Care Team:  Chano Ortiz MD as PCP - General  Chano Ortiz MD as PCP - Family Medicine     ROBEL MOJICA H&P Note    I supervised care provided by the midlevel provider. We have discussed this patient's history, physical exam, and treatment plan. I have reviewed the midlevel provider's note and I agree with the midlevel provider's findings and plan of care.   SHARED VISIT: This visit was performed by BOTH a physician and an APC. The substantive portion of the medical decision making was performed by this attesting physician who made or approved the management plan and takes responsibility for patient management.   I have personally had a face to face encounter with the patient.   My personal findings are documented below:    History:  34-year-old female with past medical history including Crohn's disease and iron deficiency anemia presents with abdominal pain and hematochezia. CT imaging showed prominent vasa recta adjacent to the sigmoid colon, questionable right perianal scarring, right hemicolectomy.  Hemoglobin 8.0.  Patient initially tachycardic, tachycardia resolved, no hypotension, vital signs stable.    Physical Exam:  General: No acute distress.  HENT: NCAT, PERRL, Nares patent.  Eyes: no scleral icterus.  Neck: trachea midline, no ROM limitations.  CV: regular rhythm, regular rate.  Respiratory: normal effort, CTAB.  Abdomen: soft, nondistended, left lower quadrant tenderness to palpation, no rebound tenderness, no guarding or rigidity.  Musculoskeletal: no deformity.  Neuro: alert, moves all extremities, follows commands.  Skin: warm, dry.    Assessment and Plan:  Patient admitted to the observation unit, GI consulted, monitoring hemoglobin with trending labs, giving iron, treating pain, clear liquid diet with n.p.o. after midnight, GI evaluated patient and started on IV steroids, plan for colonoscopy with GI in AM.

## 2024-12-12 NOTE — ANESTHESIA PREPROCEDURE EVALUATION
Anesthesia Evaluation     Patient summary reviewed and Nursing notes reviewed   history of anesthetic complications:  PONV               Airway   Mallampati: II  Dental      Pulmonary - negative pulmonary ROS   Cardiovascular     Rhythm: regular  Rate: normal    (+) valvular problems/murmurs murmur      Neuro/Psych  (+) psychiatric history Anxiety and Depression  GI/Hepatic/Renal/Endo    (+) obesity, GI bleeding , thyroid problem hypothyroidism    Musculoskeletal (-) negative ROS    Abdominal    Substance History - negative use     OB/GYN negative ob/gyn ROS         Other                    Anesthesia Plan    ASA 2     MAC     intravenous induction     Anesthetic plan, risks, benefits, and alternatives have been provided, discussed and informed consent has been obtained with: patient.    CODE STATUS:    Level Of Support Discussed With: Patient  Code Status (Patient has no pulse and is not breathing): CPR (Attempt to Resuscitate)  Medical Interventions (Patient has pulse or is breathing): Full Support

## 2024-12-12 NOTE — PLAN OF CARE
Goal Outcome Evaluation:  Plan of Care Reviewed With: patient        Progress: improving  Outcome Evaluation: s/p Colonoscopy today. VSS, diet tolerated. Still complaining of abdominal cramps, PRN pain meds given. Plan for another Fe infusion, then probable discharge tomorrow.

## 2024-12-12 NOTE — ANESTHESIA POSTPROCEDURE EVALUATION
Patient: Doreen Stephenson    Procedure Summary       Date: 12/12/24 Room / Location: [facility] ENDOSCOPY 5 / [facility] ENDOSCOPY    Anesthesia Start: 1135 Anesthesia Stop: 1200    Procedure: COLONOSCOPY to anastamosis with cold biopsies Diagnosis:       Crohn's disease of large intestine with rectal bleeding      (Crohn's disease of large intestine with rectal bleeding [K50.111])    Surgeons: Stephon West MD Provider: Yevgeniy King MD    Anesthesia Type: MAC ASA Status: 2            Anesthesia Type: MAC    Vitals  Vitals Value Taken Time   /72 12/12/24 1215   Temp     Pulse 57 12/12/24 1216   Resp 11 12/12/24 1215   SpO2 100 % 12/12/24 1216   Vitals shown include unfiled device data.        Post Anesthesia Care and Evaluation    Patient location during evaluation: PACU  Patient participation: complete - patient participated  Level of consciousness: awake and alert  Pain management: adequate    Airway patency: patent  Anesthetic complications: No anesthetic complications    Cardiovascular status: acceptable  Respiratory status: acceptable  Hydration status: acceptable    Comments: ---------------------------               12/12/24                      1228         ---------------------------   BP:          131/88         Pulse:         80           Resp:          16           Temp:   36.8 °C (98.3 °F)   SpO2:          98%         ---------------------------

## 2024-12-12 NOTE — PROGRESS NOTES
ROBEL MOJICA Attestation Note     I supervised care provided by the midlevel provider. We have discussed this patient's history, physical exam, and treatment plan. I have reviewed the midlevel provider's note and I agree with the midlevel provider's findings and plan of care.   SHARED VISIT: This visit was performed by BOTH a physician and an APC. The substantive portion of the medical decision making was performed by this attesting physician who made or approved the management plan and takes responsibility for patient management. All studies in the APC note (if performed) were independently interpreted by me.   I have personally had a face to face encounter with the patient.   My personal findings are documented below:      Subjective  Pt is a 34 y.o. female admitted from Emergency Department for evaluation and treatment of abdominal pain.  Patient reports pain persistent lower abdomen.  She is just back from colonoscopy and reports mild diffuse gas.    Physical Exam  GENERAL: Alert and in NAD, Vitals reviewed  HENT: nares patent  EYES: no scleral icterus  CV: regular rhythm, regular rate-no murmur  RESPIRATORY: normal effort  ABDOMEN: soft moderate lower abdominal tenderness to palpation without rebound or guard  MUSCULOSKELETAL: no deformity  NEURO: Strength sensation and coordination are grossly intact.  Speech and mentation are unremarkable  SKIN: warm, dry    Assessment/Plan  I discussed tx and evaluation of this patient with JOHN Scott.  Appreciate GI consult by Dr. Morgan Interiano.  Patient underwent endoscopy but is still having pain.  Will keep overnight to reassess after diet and try to obtain iron transfusion in the morning.  Anticipate discharge tomorrow.

## 2024-12-12 NOTE — PROGRESS NOTES
ED OBSERVATION PROGRESS/DISCHARGE SUMMARY    Date of Admission: 12/11/2024   LOS: 0 days   PCP: Chano Ortiz MD      Subjective:    Hospital Outcome:   34-year-old female with a past medical history including but not limited to Crohn's disease and iron deficiency anemia who was admitted to the observation unit for further evaluation of abdominal pain and hematochezia.  In the ER, CT imaging showed prominent vasa recta adjacent to the sigmoid colon that may indicate some underlying inflammation, questionable right perianal scarring, right hemicolectomy.  Hemoglobin 8.0.  Patient initially tachycardic, resolved IV fluids.    Gastroenterology consulted started patient on IV steroids and will plan for colonoscopy in the morning.  Patient to receive iron infusion.  Hemoglobin remained stable from 8.0-8.2.    ROS:  General: no fevers, chills  Respiratory: no cough, dyspnea  Cardiovascular: no chest pain, palpitations  Abdomen: Abdominal pain and BRBPR  Neurologic: No focal weakness    12/12/2024.    9:08 AM.  There is a plan for colonoscopy at 1100 hrs.  GI continues to follow.  Hemoglobin appears stable.    5:16 PM.  Reassessed patient at bedside.  Reviewed colonoscopy findings with patient.  Patient did eat and held down solids and liquids without any difficulty.  She states she is still continuing to have pain and she did have bloody bowel movements this morning.  She is concerned about going home tonight.  She states she would like to stay another evening to ensure she is stable prior to discharge.  She also reports she was due to get an iron infusion tomorrow and wants to know if this can maybe be done in the morning.    Objective   Physical Exam:  I have reviewed the vital signs.  Temp:  [97.9 °F (36.6 °C)-98.3 °F (36.8 °C)] 98.3 °F (36.8 °C)  Heart Rate:  [58-81] 76  Resp:  [11-19] 18  BP: (103-131)/(64-88) 121/73  General Appearance:    Alert, cooperative, no distress  Head:    Normocephalic, atraumatic  Eyes:     Sclerae anicteric  Neck:   Supple, no mass  Lungs: Clear to auscultation bilaterally, respirations unlabored  Heart: Regular rate and rhythm, S1 and S2 normal, no murmur, rub or gallop  Abdomen:  Soft, nontender, bowel sounds active, nondistended  Extremities: No clubbing, cyanosis, or edema to lower extremities  Pulses:  2+ and symmetric in distal lower extremities  Skin: No rashes   Neurologic: Oriented x3, Normal strength to extremities    Results Review:    I have reviewed the labs, radiology results and diagnostic studies.    Results from last 7 days   Lab Units 12/12/24  0420   WBC 10*3/mm3 7.74   HEMOGLOBIN g/dL 8.6*   HEMATOCRIT % 29.5*   PLATELETS 10*3/mm3 330     Results from last 7 days   Lab Units 12/12/24  0420 12/11/24  0938   SODIUM mmol/L 139 137   POTASSIUM mmol/L 4.0 3.9   CHLORIDE mmol/L 105 105   CO2 mmol/L 22.0 22.3   BUN mg/dL 7 8   CREATININE mg/dL 0.66 0.80   CALCIUM mg/dL 9.0 8.6   BILIRUBIN mg/dL 0.2 <0.2   ALK PHOS U/L 69 74   ALT (SGPT) U/L 15 13   AST (SGOT) U/L 20 21   GLUCOSE mg/dL 112* 103*     Imaging Results (Last 24 Hours)       ** No results found for the last 24 hours. **            I have reviewed the medications.  ---------------------------------------------------------------------------------------------  Assessment & Plan   Assessment/Problem List    Abdominal pain    Crohn's disease of large intestine with rectal bleeding      Plan:      Abdominal pain  Crohn disease  Hematochezia  -Clear liquid diet, n.p.o. after midnight  -GI following, plan for scope this morning  -Hemoglobin 8.0, 8.2  -GI panel negative acute  -Norco and Bentyl as needed for abdominal pain     Iron deficiency anemia  -Ganzoni equation shows patient has a 1,297 mg total iron deficit  -Ferric gluconate 250 mg IV ordered  -Hemoglobin 8.0, trend with a.m. labs    Disposition: Pending    Follow-up after Discharge: Pending    This note will serve as progress      50 minutes have been spent by [facility] providers in the care of this patient while under observation status.    Appropriate PPE worn during patient encounter.  Hand hygeine performed before and after seeing the patient.      Stephen Scott III, PA 12/12/24 17:17 EST

## 2024-12-12 NOTE — PLAN OF CARE
Goal Outcome Evaluation:      Pt. to finish bowel prep this morning.GI panel negative. Colonoscopy scheduled for 1100. Hgb went from 8.2 to 8.6 this morning.

## 2024-12-13 ENCOUNTER — READMISSION MANAGEMENT (OUTPATIENT)
Dept: CALL CENTER | Facility: HOSPITAL | Age: 34
End: 2024-12-13
Payer: COMMERCIAL

## 2024-12-13 ENCOUNTER — HOSPITAL ENCOUNTER (OUTPATIENT)
Dept: INFUSION THERAPY | Facility: HOSPITAL | Age: 34
Discharge: HOME | End: 2024-12-13
Payer: COMMERCIAL

## 2024-12-13 VITALS
RESPIRATION RATE: 18 BRPM | SYSTOLIC BLOOD PRESSURE: 118 MMHG | TEMPERATURE: 97.9 F | OXYGEN SATURATION: 100 % | HEART RATE: 67 BPM | HEIGHT: 69 IN | DIASTOLIC BLOOD PRESSURE: 74 MMHG | BODY MASS INDEX: 27.11 KG/M2 | WEIGHT: 183 LBS

## 2024-12-13 LAB
ANION GAP SERPL CALCULATED.3IONS-SCNC: 8.7 MMOL/L (ref 5–15)
BASOPHILS # BLD AUTO: 0.06 10*3/MM3 (ref 0–0.2)
BASOPHILS NFR BLD AUTO: 1 % (ref 0–1.5)
BUN SERPL-MCNC: 9 MG/DL (ref 6–20)
BUN/CREAT SERPL: 13.2 (ref 7–25)
CALCIUM SPEC-SCNC: 8.4 MG/DL (ref 8.6–10.5)
CHLORIDE SERPL-SCNC: 109 MMOL/L (ref 98–107)
CO2 SERPL-SCNC: 24.3 MMOL/L (ref 22–29)
CREAT SERPL-MCNC: 0.68 MG/DL (ref 0.57–1)
CYTO UR: NORMAL
DEPRECATED RDW RBC AUTO: 40.7 FL (ref 37–54)
EGFRCR SERPLBLD CKD-EPI 2021: 116.6 ML/MIN/1.73
EOSINOPHIL # BLD AUTO: 0.05 10*3/MM3 (ref 0–0.4)
EOSINOPHIL NFR BLD AUTO: 0.9 % (ref 0.3–6.2)
ERYTHROCYTE [DISTWIDTH] IN BLOOD BY AUTOMATED COUNT: 15.2 % (ref 12.3–15.4)
GLUCOSE SERPL-MCNC: 79 MG/DL (ref 65–99)
HCT VFR BLD AUTO: 29.3 % (ref 34–46.6)
HGB BLD-MCNC: 8.6 G/DL (ref 12–15.9)
IMM GRANULOCYTES # BLD AUTO: 0.02 10*3/MM3 (ref 0–0.05)
IMM GRANULOCYTES NFR BLD AUTO: 0.3 % (ref 0–0.5)
LAB AP CASE REPORT: NORMAL
LAB AP DIAGNOSIS COMMENT: NORMAL
LAB AP SPECIAL STAINS: NORMAL
LYMPHOCYTES # BLD AUTO: 1.81 10*3/MM3 (ref 0.7–3.1)
LYMPHOCYTES NFR BLD AUTO: 31.6 % (ref 19.6–45.3)
MCH RBC QN AUTO: 21.8 PG (ref 26.6–33)
MCHC RBC AUTO-ENTMCNC: 29.4 G/DL (ref 31.5–35.7)
MCV RBC AUTO: 74.4 FL (ref 79–97)
MONOCYTES # BLD AUTO: 0.45 10*3/MM3 (ref 0.1–0.9)
MONOCYTES NFR BLD AUTO: 7.9 % (ref 5–12)
NEUTROPHILS NFR BLD AUTO: 3.33 10*3/MM3 (ref 1.7–7)
NEUTROPHILS NFR BLD AUTO: 58.3 % (ref 42.7–76)
PATH REPORT.FINAL DX SPEC: NORMAL
PATH REPORT.GROSS SPEC: NORMAL
PLATELET # BLD AUTO: 344 10*3/MM3 (ref 140–450)
PMV BLD AUTO: 9.6 FL (ref 6–12)
POTASSIUM SERPL-SCNC: 3.6 MMOL/L (ref 3.5–5.2)
RBC # BLD AUTO: 3.94 10*6/MM3 (ref 3.77–5.28)
SODIUM SERPL-SCNC: 142 MMOL/L (ref 136–145)
WBC NRBC COR # BLD AUTO: 5.72 10*3/MM3 (ref 3.4–10.8)

## 2024-12-13 PROCEDURE — 85025 COMPLETE CBC W/AUTO DIFF WBC: CPT | Performed by: PHYSICIAN ASSISTANT

## 2024-12-13 PROCEDURE — 80048 BASIC METABOLIC PNL TOTAL CA: CPT | Performed by: PHYSICIAN ASSISTANT

## 2024-12-13 PROCEDURE — G0378 HOSPITAL OBSERVATION PER HR: HCPCS

## 2024-12-13 PROCEDURE — 25010000002 NA FERRIC GLUC CPLX PER 12.5 MG: Performed by: STUDENT IN AN ORGANIZED HEALTH CARE EDUCATION/TRAINING PROGRAM

## 2024-12-13 PROCEDURE — 25810000003 SODIUM CHLORIDE 0.9 % SOLUTION: Performed by: STUDENT IN AN ORGANIZED HEALTH CARE EDUCATION/TRAINING PROGRAM

## 2024-12-13 RX ADMIN — VENLAFAXINE HYDROCHLORIDE 225 MG: 150 CAPSULE, EXTENDED RELEASE ORAL at 08:01

## 2024-12-13 RX ADMIN — LEVOTHYROXINE SODIUM 25 MCG: 25 TABLET ORAL at 08:01

## 2024-12-13 RX ADMIN — Medication 10 ML: at 08:01

## 2024-12-13 RX ADMIN — SODIUM CHLORIDE 250 MG: 9 INJECTION, SOLUTION INTRAVENOUS at 07:55

## 2024-12-13 NOTE — PROGRESS NOTES
Discharge Planning Assessment  [facility]     Patient Name: Doreen Stephenson  MRN: [id number]  Today's Date: 12/13/2024    Admit Date: 12/11/2024    Plan: Home   Discharge Needs Assessment    No documentation.                  Discharge Plan       Row Name 12/13/24 1750       Plan    Plan Home    Final Discharge Disposition Code 01 - home or self-care    Final Note Home                  Continued Care and Services - Discharged on 12/13/2024 Admission date: 12/11/2024 - Discharge disposition: Home or Self Care   No active coordination exists for this encounter.       Selected Continued Care - Episodes Includes continued care and service providers with selected services from the active episodes listed below      Rising Risk Care Management Episode start date: 12/12/2024   There are no active outsourced providers for this episode.                 Expected Discharge Date and Time       Expected Discharge Date Expected Discharge Time    Dec 13, 2024            Demographic Summary    No documentation.                  Functional Status    No documentation.                  Psychosocial    No documentation.                  Abuse/Neglect    No documentation.                  Legal    No documentation.                  Substance Abuse    No documentation.                  Patient Forms    No documentation.                     Tavia Basilio RN

## 2024-12-13 NOTE — PLAN OF CARE
Goal Outcome Evaluation:      Pt in for Abd pain, awaiting iron infusion in AM and diet tolerance, bed low, vitals stable, wheels locked, call light in reach

## 2024-12-13 NOTE — PLAN OF CARE
Goal Outcome Evaluation:      Pt d/c via private vehicle. IV removed. Belongings returned. Follow up instructions given. No further questions/concerns at this time.

## 2024-12-13 NOTE — OUTREACH NOTE
Prep Survey      Flowsheet Row Responses   [facility] patient discharged from? [place]   Is LACE score < 7 ? Yes   Eligibility [facility]   Date of Admission 12/11/24   Date of Discharge 12/13/24   Discharge diagnosis Abdominal pain   Does the patient have one of the following disease processes/diagnoses(primary or secondary)? Other   Prep survey completed? Yes            Linda MENDOZA - Registered Nurse

## 2024-12-13 NOTE — DISCHARGE SUMMARY
ED OBSERVATION PROGRESS/DISCHARGE SUMMARY    Date of Admission: 12/11/2024   LOS: 0 days   PCP: Chano Ortiz MD    Final diagnosis: GI bleeding, anemia, Crohn's disease    Hospital Outcome:     34-year-old female with a past medical history including but not limited to Crohn's disease and iron deficiency anemia who was admitted to the observation unit for further evaluation of abdominal pain and hematochezia.  In the ER, CT imaging showed prominent vasa recta adjacent to the sigmoid colon that may indicate some underlying inflammation, questionable right perianal scarring, right hemicolectomy.  Hemoglobin 8.0.  Patient initially tachycardic, resolved IV fluids.     Gastroenterology consulted started patient on IV steroids and will plan for colonoscopy in the morning.  Patient to receive iron infusion.  Hemoglobin remained stable from 8.0-8.2.    Patient underwent colonoscopy that shows localized area of minimally inflamed mucosa found at the ileocolonic anastomoses.  Patient continued to have pain.  Patient kept overnight.    12/13/2024:   Hemoglobin remained stable at 8.6.  She had an iron infusion this morning.  Pain is improved and she is tolerating diet.  She will follow-up with her primary gastroenterologist Dr. Peña.  Also encouraged patient to follow-up with her PCP regarding further management of her anemia, possible continued iron infusions.    ROS:  General: no fevers, chills  Respiratory: no cough, dyspnea  Cardiovascular: no chest pain, palpitations  Abdomen: No abdominal pain, nausea, vomiting, or diarrhea  Neurologic: No focal weakness    Objective   Physical Exam:  I have reviewed the vital signs.  Temp:  [97.9 °F (36.6 °C)-98.5 °F (36.9 °C)] 97.9 °F (36.6 °C)  Heart Rate:  [58-80] 67  Resp:  [11-19] 18  BP: (103-131)/(64-88) 118/74  General Appearance:    Alert, cooperative, no distress  Head:    Normocephalic, atraumatic  Eyes:    Sclerae anicteric  Neck:   Supple, no mass  Lungs: Clear to  auscultation bilaterally, respirations unlabored  Heart: Regular rate and rhythm, S1 and S2 normal, no murmur, rub or gallop  Abdomen:  Soft, nontender, bowel sounds active, nondistended  Extremities: No clubbing, cyanosis, or edema to lower extremities  Pulses:  2+ and symmetric in distal lower extremities  Skin: No rashes   Neurologic: Oriented x3, Normal strength to extremities    Results Review:    I have reviewed the labs, radiology results and diagnostic studies.    Results from last 7 days   Lab Units 12/13/24  0359   WBC 10*3/mm3 5.72   HEMOGLOBIN g/dL 8.6*   HEMATOCRIT % 29.3*   PLATELETS 10*3/mm3 344     Results from last 7 days   Lab Units 12/13/24  0359 12/12/24  0420 12/11/24  0938   SODIUM mmol/L 142 139 137   POTASSIUM mmol/L 3.6 4.0 3.9   CHLORIDE mmol/L 109* 105 105   CO2 mmol/L 24.3 22.0 22.3   BUN mg/dL 9 7 8   CREATININE mg/dL 0.68 0.66 0.80   CALCIUM mg/dL 8.4* 9.0 8.6   BILIRUBIN mg/dL  --  0.2 <0.2   ALK PHOS U/L  --  69 74   ALT (SGPT) U/L  --  15 13   AST (SGOT) U/L  --  20 21   GLUCOSE mg/dL 79 112* 103*     Imaging Results (Last 24 Hours)       ** No results found for the last 24 hours. **            I have reviewed the medications.  ---------------------------------------------------------------------------------------------  Assessment & Plan   Assessment/Problem List    Abdominal pain    Crohn's disease of large intestine with rectal bleeding    Plan:    Abdominal pain  Crohn disease  Hematochezia  -GI following  -Colonoscopy shows inflamed mucosa at the colonic anastomosis  -Hemoglobin 8.0, 8.2, 8.6, 8.6  -GI panel negative acute  -Continue the course of steroids you are already prescribed  -To follow-up with Dr. Eloise Dang as an outpatient     Iron deficiency anemia  -Ganzoni equation shows patient has a 1,297 mg total iron deficit  -Ferric gluconate 250 mg IV ordered  -Hemoglobin 8.6 today  -Encouraged her to follow-up with her PCP for continued iron infusions, possible hematology  referral    Disposition: Home    Follow-up after Discharge: Primary care, gastroenterology    This note will serve as discharge summary    32 minutes have been spent by [facility] providers in the care of this patient while under observation status.    Appropriate PPE worn during patient encounter.  Hand hygeine performed before and after seeing the patient.    Mary Garcia, APRN 12/13/24 11:06 EST

## 2024-12-13 NOTE — PROGRESS NOTES
ROBEL MOJICA Attestation Note    I supervised care provided by the midlevel provider.    The ZOLTAN and I have discussed this patient's history, physical exam, and treatment plan. I have reviewed the documentation and personally had a face to face interaction with the patient  I affirm the documentation and agree with the treatment and plan. I provided a substantive portion of the care of this patient.  I personally performed the physical exam, in its entirety.  My personal findings are documented in below:    History:  34-year-old female with history of Crohn's reports improved abdominal discomfort this morning and has tolerated some p.o. intake.    Physical Exam:  General: No acute distress.  HENT: NCAT  Eyes: no scleral icterus.  Neck: Painless range of motion  CV: Pink warm and well-perfused throughout  Respiratory: No distress or increased work of breathing  Abdomen: soft, mild diffuse discomfort with no peritoneal findings  Musculoskeletal: no deformity.  Neuro: Alert, speech fluent and easily intelligible  Skin: warm, dry.    Assessment and Plan:  Acute abdominal pain: Improved, tolerating diet  Iron deficiency anemia: Iron infusion in process this morning

## 2024-12-13 NOTE — DISCHARGE INSTRUCTIONS
Gastroenterology should call you with pathology results from your colonoscopy within 14 days.  Please call their office if you do not hear anything.    Complete the course of steroids you were already prescribed.

## 2024-12-14 ENCOUNTER — TRANSITIONAL CARE MANAGEMENT TELEPHONE ENCOUNTER (OUTPATIENT)
Dept: CALL CENTER | Facility: HOSPITAL | Age: 34
End: 2024-12-14
Payer: COMMERCIAL

## 2024-12-14 NOTE — OUTREACH NOTE
Call Center TCM Note      Flowsheet Row Responses   [facility] patient discharged from? [place]   Does the patient have one of the following disease processes/diagnoses(primary or secondary)? Other   TCM attempt successful? No   Unsuccessful attempts Attempt 1   Call Status Voice mail issues            Betty Reed RN    12/14/2024, 16:25 EST

## 2024-12-16 ENCOUNTER — TRANSITIONAL CARE MANAGEMENT TELEPHONE ENCOUNTER (OUTPATIENT)
Dept: CALL CENTER | Facility: HOSPITAL | Age: 34
End: 2024-12-16
Payer: COMMERCIAL

## 2024-12-16 NOTE — OUTREACH NOTE
Call Center TCM Note      Flowsheet Row Responses   [facility] patient discharged from? [place]   Does the patient have one of the following disease processes/diagnoses(primary or secondary)? Other   TCM attempt successful? No   Unsuccessful attempts Attempt 2   Call Status Left message            Mary Shanks RN    12/16/2024, 14:57 EST

## 2024-12-17 ENCOUNTER — TRANSITIONAL CARE MANAGEMENT TELEPHONE ENCOUNTER (OUTPATIENT)
Dept: CALL CENTER | Facility: HOSPITAL | Age: 34
End: 2024-12-17
Payer: COMMERCIAL

## 2024-12-17 NOTE — OUTREACH NOTE
Call Center TCM Note      Flowsheet Row Responses   [facility] patient discharged from? [place]   Does the patient have one of the following disease processes/diagnoses(primary or secondary)? Other   TCM attempt successful? No   Unsuccessful attempts Attempt 3            Raymond Lopez RN    12/17/2024, 14:53 EST

## 2024-12-20 ENCOUNTER — HOSPITAL ENCOUNTER (OUTPATIENT)
Dept: INFUSION THERAPY | Facility: HOSPITAL | Age: 34
Discharge: HOME | End: 2024-12-20
Payer: COMMERCIAL

## 2025-01-07 ENCOUNTER — TELEPHONE (OUTPATIENT)
Dept: INTERNAL MEDICINE | Age: 35
End: 2025-01-07
Payer: COMMERCIAL

## 2025-01-07 NOTE — TELEPHONE ENCOUNTER
Pt tested Positive for COVID today, can you call her in Paxlovid?  Sonja @ [facility].

## 2025-01-14 ENCOUNTER — SPECIALTY PHARMACY (OUTPATIENT)
Dept: PHARMACY | Facility: TELEHEALTH | Age: 35
End: 2025-01-14
Payer: COMMERCIAL

## 2025-01-14 NOTE — PROGRESS NOTES
Specialty Pharmacy Patient Management Program  Initial Assessment     Doreen Stephenson is a 34 y.o. female with Crohn's disease and enrolled in the Patient Management program offered by [facility]. An initial outreach was conducted, including assessment of therapy appropriateness and specialty medication education for Skyrizi. The patient was introduced to services offered by [facility], including: regular assessments, refill coordination, curbside pick-up or mail order delivery options, prior authorization maintenance, and financial assistance programs as applicable. The patient was also provided with contact information for the pharmacy team.     Insurance Coverage & Financial Support  Affirmed + copaycard      Relevant Past Medical History and Comorbidities  Relevant medical history and concomitant health conditions were discussed with the patient. The patient's chart has been reviewed for relevant past medical history and comorbid health conditions and updated as necessary.   Past Medical History:   Diagnosis Date    Anemia     Anxiety     Crohn's disease     Depression     Incisional hernia     PONV (postoperative nausea and vomiting)     USED PATCH PREVIOUSLY    Ventral hernia      Social History     Socioeconomic History    Marital status: Single   Tobacco Use    Smoking status: Never    Smokeless tobacco: Never   Vaping Use    Vaping status: Never Used   Substance and Sexual Activity    Alcohol use: No    Drug use: No    Sexual activity: Defer     Problem list reviewed by Ophelia West RPH on 1/14/2025 at  2:23 PM    Allergies  Known allergies and reactions were discussed with the patient. The patient's chart has been reviewed for allergy information and updated as necessary.   Morphine  Allergies reviewed by Ophelia West RPH on 1/14/2025 at  2:23 PM    Current Medication List  This medication list has been reviewed with the patient and evaluated for any  interactions or necessary modifications/recommendations, and updated to include all prescription medications, OTC medications, and supplements the patient is currently taking. This list reflects what is contained in the patient's profile, which has also been marked as reviewed to communicate to other providers it is the most up to date version of the patient's current medication therapy.     Current Outpatient Medications:     Cyanocobalamin (VITAMIN B 12 PO), Take 1 tablet by mouth Daily., Disp: , Rfl:     levothyroxine (SYNTHROID, LEVOTHROID) 25 MCG tablet, Take 1 tablet by mouth Daily., Disp: 90 tablet, Rfl: 1    Risankizumab-rzaa (Skyrizi) 180 MG/1.2ML solution cartridge, Inject 1.2 mL under the skin into the appropriate area as directed Every 8 (Eight) Weeks., Disp: 1.2 mL, Rfl: 2    venlafaxine XR (EFFEXOR-XR) 75 MG 24 hr capsule, Take 3 capsules by mouth Daily., Disp: 270 capsule, Rfl: 1    VITAMIN D PO, Take 4,000 Units by mouth Daily., Disp: , Rfl:   Medicines reviewed by Ophelia [facility] on 1/14/2025 at  2:23 PM    Drug Interactions  none     Relevant Laboratory Values  Lab Results   Component Value Date    GLUCOSE 79 12/13/2024    CALCIUM 8.4 (L) 12/13/2024     12/13/2024    K 3.6 12/13/2024    CO2 24.3 12/13/2024     (H) 12/13/2024    BUN 9 12/13/2024    CREATININE 0.68 12/13/2024    BCR 13.2 12/13/2024    ANIONGAP 8.7 12/13/2024     Lab Results   Component Value Date    WBC 5.72 12/13/2024    HGB 8.6 (L) 12/13/2024    HCT 29.3 (L) 12/13/2024    MCV 74.4 (L) 12/13/2024     12/13/2024    INR 0.96 10/13/2022     Lab Value Review  The above lab values have been reviewed; the following specialty medication(s) dose adjustment(s) are recommended: none.    Initial Education Provided for Specialty Medication  The patient has been provided with the following education and any applicable administration techniques (i.e. self-injection) have been demonstrated for the therapies indicated. All  questions and concerns have been addressed prior to the patient receiving the medication, and the patient has verbalized understanding of the education and any materials provided. Additional patient education shall be provided and documented upon request by the patient, provider or payer.         Skyrizi (Risankizumab)         Medication Expectations   Why am I taking this medication? You are taking this medication for either moderate to severe plaque psoriasis or psoriatic arthritis.   What should I expect while on this medication? After 24 weeks of taking skyrizi, plaque psoriasis patients reported skin to be 90% clearer. For arthritis patients, a majority of patients noticed improvement and significant relief in fingers and joints.   How does the medication work? Risankizumab-rzaa is a humanized immunoglobulin G1 (IgG1) monoclonal antibody that selectively binds to the p19 subunit of human interleukin 23 (IL-23) cytokine and inhibits its interaction with the IL-23 receptor. IL-23 is a naturally occurring cytokine that is involved in inflammatory and immune responses. Risankizumab-rzaa inhibits the release of pro-inflammatory cytokines and chemokines.   How long will I be on this medication for? The amount of time you will be on this medication will be determined by your doctor and your response to the medication.    How do I take this medication? Take as directed on your prescription label. There is an initial loading dose at week 0 and 4, then routinely every 12 weeks therafter. Skyrizi is a subcutaneous injection. It may be administered in the stomach, upper thigh, or upper outer arm. Allow to sit out of the refrigerator for 30 to 90 minutes for the pen and 15 to 30 minutes for the syringe before injecting.   What are some possible side effects? Injection site reactions or hypersensitivity reactions, headache, tiredness, upper respiratory tract injection, or fungal skin injection.   What happens if I miss a  dose? If you miss a dose, take it as soon as you remember.            Medication Safety   What are things I should warn my doctor immediately about? Allergic reaction such has hives or trouble breathing. If you develop symptoms of infection, such as a cough or fever, that do not go away, weight loss, changes in how often you urinate, changes in sweating, muscle pain or weakness, or severe dizziness.     What are things that I should be cautious of? Injection site reaction, headache, and fatigue. You may have more chance of getting an infection. Wash your hands often and stay away from people with infections, colds, or flu.   What are some medications that can interact with this one? Immunosuppressants and vaccines            Medication Storage/Handling   How should I handle this medication? Keep this medicine out of reach of pets and children. Keep the product in the original carton to protect from light until time of use. Do not shake or freeze.  Do not use if: solution contains large particles or is cloudy or discolored; solution has been frozen; prefilled pen or syringe has been dropped or damaged; carton perforations are broken.  Do not inject where the skin is tender, bruised, red, hard, or affected by psoriasis. Rotate injection sites.   How does this medication need to be stored? Store in refrigerator and keep dry. If needed, you may store at room temperature for up to 24 hours but do not return to the refrigerator if unused.    How should I dispose of this medication? You can dispose of the empty syringe in a sharps container, and if this is not available you may use an empty hard plastic container such as a milk jug or detergent container. Discard any unused portion or if stored outside of refrigerator > 24 hours.            Resources/Support   How can I remind myself to take this medication? You can download a reminder [email or web address] on your phone or use a calandar to help remember your next injection.   Is financial  support available?  Yes, [employer] provides co-pay cards if you have commercial insurance or [employer] Assist patient assistance if you have Medicare or no insurance.    Which vaccines are recommended for me? Talk to your doctor about these vaccines: Flu, Coronavirus (COVID-19), Pneumococcal (pneumonia), Tdap, Hepatitis B, Zoster (shingles)                 Adherence, Self-Administration, and Current Therapy Problems  Adherence related to the patient's specialty therapy was discussed with the patient. The Adherence segment of this outreach has been reviewed and updated.          Additional Barriers to Patient Self-Administration: none identified  Methods for Supporting Patient Self-Administration: n/a    Open Medication Therapy Problems  No medication therapy recommendations to display    Goals of Therapy   Goals Addressed Today    None         Reassessment Plan & Follow-Up  Medication Therapy Changes: Patient is not new to therapy, but is new to [facility]  Additional Plans, Therapy Recommendations, or Therapy Problems to Be Addressed: none   Pharmacist to perform regular reassessments no more than (6) months from the previous assessment.  Welcome information and patient satisfaction survey to be sent by retail team with patient's initial fill.  Care Coordinator to set up future refill outreaches, coordinate prescription delivery, and escalate clinical questions to pharmacist.     Attestation  I attest the patient was actively involved in and has agreed to the above plan of care. I attest that the initiated specialty medication(s) are appropriate for the patient based on my assessment. If the prescribed therapy is at any point deemed not appropriate based on the current or future assessments, a consultation will be initiated with the patient's specialty care provider to determine the best course of action. The revised plan of therapy will be documented along with any reassessments and/or additional patient education provided.      Electronically signed by Ophelia West [facility], 01/14/25, 2:23 PM EST.

## 2025-01-16 ENCOUNTER — TELEPHONE (OUTPATIENT)
Dept: INTERNAL MEDICINE | Age: 35
End: 2025-01-16
Payer: COMMERCIAL

## 2025-01-16 NOTE — TELEPHONE ENCOUNTER
Patient needs a return to work note for tomorrow.     Per Dr. Ortiz this is okay, this is in her chart and she can access it on [facility].

## 2025-01-29 VITALS
OXYGEN SATURATION: 98 % | HEART RATE: 78 BPM | SYSTOLIC BLOOD PRESSURE: 118 MMHG | HEIGHT: 69 IN | DIASTOLIC BLOOD PRESSURE: 60 MMHG | WEIGHT: 180 LBS

## 2025-01-29 PROBLEM — K63.3 ULCER OF INTESTINE: Status: ACTIVE | Noted: 2023-09-06

## 2025-01-29 PROBLEM — K63.89 OTHER SPECIFIED DISEASES OF INTESTINE: Status: ACTIVE | Noted: 2023-09-06

## 2025-01-29 PROBLEM — R10.84 GENERALIZED ABDOMINAL PAIN: Status: ACTIVE | Noted: 2024-06-21

## 2025-01-30 ENCOUNTER — OFFICE (AMBULATORY)
Dept: URBAN - METROPOLITAN AREA CLINIC 94 | Facility: CLINIC | Age: 35
End: 2025-01-30
Payer: COMMERCIAL

## 2025-01-30 DIAGNOSIS — D64.9 ANEMIA, UNSPECIFIED: ICD-10-CM

## 2025-01-30 DIAGNOSIS — K50.013 CROHN'S DISEASE OF SMALL INTESTINE WITH FISTULA: ICD-10-CM

## 2025-01-30 DIAGNOSIS — R10.84 GENERALIZED ABDOMINAL PAIN: ICD-10-CM

## 2025-01-30 DIAGNOSIS — K63.3 ULCER OF INTESTINE: ICD-10-CM

## 2025-01-30 DIAGNOSIS — K64.4 RESIDUAL HEMORRHOIDAL SKIN TAGS: ICD-10-CM

## 2025-01-30 DIAGNOSIS — K62.5 HEMORRHAGE OF ANUS AND RECTUM: ICD-10-CM

## 2025-01-30 DIAGNOSIS — R94.5 ABNORMAL RESULTS OF LIVER FUNCTION STUDIES: ICD-10-CM

## 2025-01-30 DIAGNOSIS — Z92.25 PERSONAL HISTORY OF IMMUNOSUPPRESSION THERAPY: ICD-10-CM

## 2025-01-30 DIAGNOSIS — R19.7 DIARRHEA, UNSPECIFIED: ICD-10-CM

## 2025-01-30 PROCEDURE — 99214 OFFICE O/P EST MOD 30 MIN: CPT | Performed by: INTERNAL MEDICINE

## 2025-01-30 RX ORDER — DICYCLOMINE HYDROCHLORIDE 10 MG/1
30 CAPSULE ORAL
Qty: 90 | Refills: 5 | Status: ACTIVE

## 2025-01-30 NOTE — SERVICENOTES
She will be getting her CBC done at her primary care office so we will not order that as part of her annual labs which are due in March.  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

## 2025-01-30 NOTE — SERVICEHPINOTES
12/5/2019 I did see ROSSY MOCK 30 1990 in our [facility] today for a follow-up visit. Comes in for follow-up after her colonoscopy in October. Her colon looked great she had some prominent ulcerations at her anastomosis possibly ischemic dated the appears very benign I took biopsies and the biopsies were nonspecific. She overall has been doing fairly well but she does struggle with increased stool frequency multiple times daily loose bowels no blood no significant abdominal pain. She is on colestipol once daily but does not feel like it is helping. She is due for her next Stelara injection tomorrow she feels like she has increased fatigue and malaise in the week leading up to her next shot and wonders if the medicine is working. She has not see any bleeding. Her appetite is good her weight is stable[================================]7/2/2020 I did see ROSSY MOCK 31 1990 in our [facility] today for a follow-up visit. went to [facility] ER with abd pain and increased stool frequency had a CT which showed "active Crohns" no labs available given 2 abx and Medrol dosepak she is feeling better no blood in stoolbrstools loose 2 /d abd pain gonebrlast Stelara was ??4-5 weeks ago she is not surebr[====================================]3/10/2021 I did see ROSSY MOCK 31 1990 in our [facility] today for a follow-up visit. She looks great today and clinically seems to be stable. Her Stelara levels were low so we increased her injections every 6 weeks but because of supply issues as well as some side effects from her first Covid vaccine injection she has been unable to implement the every 6 week injection plan he is due now for her 6 week shot and so we will see how that goes. She does notice increased symptoms at about the 5-6 week paris with stool frequency slightly more and maybe some more mucus and abdominal cramping. No blood. When she gets the shot she will resume her baseline of 1 or 2 loose bowel movements daily which is her baseline.She does wear a small pad to prevent any seepage which she sometimes notices from the anal area she has had no obvious fistula drainage and she has no gluteal or perianal discomfort.10/21/21     Comes in for follow-up today. She is actually doing fairly well right now she had a little bit of constipation for a while and changed her diet she has not had any signs of obstruction, no nausea or vomiting. About 2 bowel movements a day normally unless she gets constipated but that is occasional. Weight is stable appetite is good. She really never had a chance to do every 6 weeks Stelara for any consistent time. Due to insurance issues and then Covid infection currently she is doing it basically every 8 weeks because she does not have enough medicine to do it sooner. She has not had any further Stelara levels measured. No other changes in her medical history since last visit no recent steroid use[========================================================================]9/12/2019 I did have the pleasure to see ROSSY MOCK 29 female 1990 in our [facility] for a consultation today. I sincerely appreciate this kind referral.diagnosed 10 yrs ago age 19 abd pain was initial sx found to have small bowel involvement by diagnostic C-scope,,,,,put on ENTOCORT "for a long time" then maybe switched to PREDNISONE by GI doc in [facility] ----says she was not getting better so sought  with Dr Acevedo and might have had repeat C-scope but was switched to SULFASALAZINE / no steroids but got nauseated so switched to 6-MP which she tolerated and she seemed to improve somewhathad a 3 rd C-scope at some point last time 2016in 2017 was having a flare of CD and a fistula was discovered in Small bowel [no details] so had resection by Dr Harris but had infection and ?dehiscence so 2 weeks later ileostomy then shortly thereafter it was [id number] was continued throughout this time until may 2019 when decision to start Stelara 1 infusion and 1 injection no bettercurrently at least 2 BM per day usually loose in AM occasionally formed sometimes sees BRB in toilet and on tissue generalized abd pain persists no F/C/wt change appetite ok concerned about missing work she is Dr Mosquera's med riovvfcpk88/13/22-------ROSSY MOCK is a 32 year old female with hx of Crohn's disease, dx'd in 2009, currently on Stelara SQ every 6 weeks here for routine follow up. She was last seen in our [place] site on 10/21/2021.  She was previously injecting Stelara every 8 weeks, but is now every 6 weeks for quite some time.  She was having diarrhea, stomach pain, fatigue in August. She went to [facility] ED, labs drawn, no imaging. She was given fluids, anti-nausea medication, and steroids. Released from ED with prescription of 5-day prednisone. She no longer has nausea, no vomiting, reflux, stomach pain. Good appetite. Her weight is stable. No fever or chills. brShe does still c/o fatigue, but much improved. She is passing about 1 BM per day. She will see some bright red blood on the toilet paper, only  few drops, a few times per week. This is a chronic issue for her, stable. 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 She does not use any hemorrhoid products. No stool urgency or incontinence, but she does have tenesmus, but nothing comes out, though she does not mention any more issues with constipation.  4/14/2022 - natue results - Detectable serum Ustekinumab, undetectable antibodies to ustekinumabLast colonoscopy was 10/4/2019 - Path Report - ulceration at anastomosis but o/w colonic bx normal w/o active Crohn's on StelaraAnnual labs were ordered in May 2022, but she did not have them done for unspecified reasons. Will reorder today.   br______________________________________________________________________________________br3/30/23   Overall doing fairly well today, she was in the emergency room in early February with some abdominal discomfort but her CT was unremarkable lab work was also normal habits are stable usually about 2 soft bowel movements daily she does have occasional days with increased stool frequency she notices some bright red blood occasionally on the tissue but she attributes this to its. No significant abdominal pain except she has noticed over the past day or so some pain at the ileostomy site in the right quadrant but no progressive pain in that area. Appetite is good, weight is stable. She has not had her Stelara levels done yet but her next injection is in 2 weeks. It late February she was having some increased stool frequency put herself on a course of prednisone which helped significantly with her symptoms. She is finished the prednisone at this point. She remains on oral iron therapy as well as dicyclomine BID minimum  sometimes TID  it helps !.Kurtis Effexor dose was increased and she feels like that is also help with some of her GI symptoms.   brhas a hernia at ileostomy site   sometimes sore including todaybr_______________________________________________________________________________________2/15/24     I did see her in the office today and she looks good and is feeling well during the past few months. She had a spell with some abdominal pain in December. Bj had to reschedule her December appointment because she was not feeling well she ended up getting a CT scan UL which was unremarkable and the symptoms settle down. Since switching to the Remicade from Stelara she feels like things are going better. She has usually 1 bowel movement daily mostly formed. She does not really need dicyclomine at all but she does have an at home. She might she some fresh blood on the tissue couple times a week but this is a stable finding and not getting worse. Lab work looks good except for ALT which is been slightly elevated from 41-39 and this is seemingly a new development since starting the Remicade. She has no liver related symptoms. Her liver pancreas and gallbladder area looked normal on the recent CT scan.Colonoscopy in September was negative for active mucosal disease except for some erosions at her anastomosis but I think are likely more ischemic than inflammatory.       6/21/2024 Colonoscopy today at [facility].         
br
br 1/30/25  I did see her in the office today. She looks very well. She is actually improved from last month when we did communicate over the portal. She had some rectal bleeding increased stool frequency abdominal distress she actually went to the hospital and got admitted they repeat her colonoscopy and it showed the same findings as from June with a bit of anastomotic ulceration. Bowel habits are better she takes Bentyl to regulate her stool frequency abdominal pain is improved she sees only a speck of fresh blood on the tissue after bowel movement but otherwise no significant rectal bleeding and at this time she seems to have reestablished her baseline.I do not have any of her records from her recent hospitalization.  Based on our recent conversations in the portal we were going to do a capsule endoscopy on her that has not yet happened because we are changing capsule companies and her symptoms have improved so we were in a hurry to get it done.

## 2025-03-06 ENCOUNTER — SPECIALTY PHARMACY (OUTPATIENT)
Dept: PHARMACY | Facility: TELEHEALTH | Age: 35
End: 2025-03-06
Payer: COMMERCIAL

## 2025-03-06 NOTE — PROGRESS NOTES
Specialty Pharmacy Patient Management Program  Refill Outreach     Doreen was contacted today regarding refills of their medication(s).    Refill Questions      Flowsheet Row Most Recent Value   Changes to allergies? No   Changes to medications? No   New conditions or infections since last clinic visit No   Unplanned office visit, urgent care, ED, or hospital admission in the last 4 weeks  No   How does patient/caregiver feel medication is working? Very good   Financial problems or insurance changes  No   Since the previous refill, were any specialty medication doses or scheduled injections missed or delayed?  No  [Next dose due 3/14/2025]   Does this patient require a clinical escalation to a pharmacist? No            Delivery Questions      Flowsheet Row Most Recent Value   Delivery method UPS   Delivery address verified with patient/caregiver? Yes   Delivery address Home   Other address preferred N/A   Number of medications in delivery 1   Medication(s) being filled and delivered Risankizumab-rzaa (Skyrizi)   Doses left of specialty medications 0   Copay verified? Yes   Copay amount $0.00   Copay form of payment No copayment ($0)   Delivery Date Selection 03/11/25   Signature Required No                 Follow-up: 23 day(s)     Bennett Chavira, Pharmacy Technician  3/6/2025  11:54 EST

## 2025-04-29 DIAGNOSIS — F33.41 RECURRENT MAJOR DEPRESSION IN PARTIAL REMISSION: ICD-10-CM

## 2025-04-29 RX ORDER — LEVOTHYROXINE SODIUM 25 UG/1
25 TABLET ORAL DAILY
Qty: 90 TABLET | Refills: 1 | Status: SHIPPED | OUTPATIENT
Start: 2025-04-29

## 2025-04-29 RX ORDER — VENLAFAXINE HYDROCHLORIDE 75 MG/1
225 CAPSULE, EXTENDED RELEASE ORAL DAILY
Qty: 270 CAPSULE | Refills: 1 | Status: SHIPPED | OUTPATIENT
Start: 2025-04-29

## 2025-05-07 ENCOUNTER — SPECIALTY PHARMACY (OUTPATIENT)
Dept: PHARMACY | Facility: TELEHEALTH | Age: 35
End: 2025-05-07
Payer: COMMERCIAL

## 2025-05-07 NOTE — PROGRESS NOTES
Specialty Pharmacy Patient Management Program  Refill Outreach     Doreen was contacted today regarding refills of their medication(s).    Refill Questions      Flowsheet Row Most Recent Value   Changes to allergies? No   Changes to medications? No   New conditions or infections since last clinic visit No   Unplanned office visit, urgent care, ED, or hospital admission in the last 4 weeks  No   How does patient/caregiver feel medication is working? Very good   Financial problems or insurance changes  No   Since the previous refill, were any specialty medication doses or scheduled injections missed or delayed?  No  [Next dose due 5/10/25]   Does this patient require a clinical escalation to a pharmacist? No            Delivery Questions      Flowsheet Row Most Recent Value   Delivery method UPS   Delivery address verified with patient/caregiver? Yes   Delivery address Home   Other address preferred N/A   Number of medications in delivery 1   Medication(s) being filled and delivered Risankizumab-rzaa (Skyrizi)   Doses left of specialty medications 0   Copay verified? Yes   Copay amount $0.00   Copay form of payment No copayment ($0)   Delivery Date Selection 05/08/25   Signature Required No                 Follow-up: 49 day(s)     Eve Jimenez, Pharmacy Technician  5/7/2025  12:33 EDT

## 2025-05-23 ENCOUNTER — RESULTS FOLLOW-UP (OUTPATIENT)
Dept: LAB | Facility: HOSPITAL | Age: 35
End: 2025-05-23
Payer: COMMERCIAL

## 2025-05-23 ENCOUNTER — TRANSCRIBE ORDERS (OUTPATIENT)
Dept: LAB | Facility: HOSPITAL | Age: 35
End: 2025-05-23
Payer: COMMERCIAL

## 2025-05-23 ENCOUNTER — LAB (OUTPATIENT)
Dept: LAB | Facility: HOSPITAL | Age: 35
End: 2025-05-23
Payer: COMMERCIAL

## 2025-05-23 DIAGNOSIS — K63.3 ULCERATION OF INTESTINE: ICD-10-CM

## 2025-05-23 DIAGNOSIS — E55.9 VITAMIN D DEFICIENCY: ICD-10-CM

## 2025-05-23 DIAGNOSIS — K50.013 CROHN'S DISEASE OF SMALL INTESTINE WITH FISTULA: ICD-10-CM

## 2025-05-23 DIAGNOSIS — Z92.25 HISTORY OF IMMUNOSUPPRESSION THERAPY: ICD-10-CM

## 2025-05-23 DIAGNOSIS — R19.7 DIARRHEA OF PRESUMED INFECTIOUS ORIGIN: ICD-10-CM

## 2025-05-23 DIAGNOSIS — K64.4 RESIDUAL HEMORRHOIDAL SKIN TAGS: ICD-10-CM

## 2025-05-23 DIAGNOSIS — R94.5 NONSPECIFIC ABNORMAL RESULTS OF FUNCTION STUDY OF LIVER: ICD-10-CM

## 2025-05-23 DIAGNOSIS — D50.0 IRON DEFICIENCY ANEMIA DUE TO CHRONIC BLOOD LOSS: ICD-10-CM

## 2025-05-23 DIAGNOSIS — R10.84 GENERALIZED ABDOMINAL PAIN: ICD-10-CM

## 2025-05-23 DIAGNOSIS — K50.013 CROHN'S DISEASE OF SMALL INTESTINE WITH FISTULA: Primary | ICD-10-CM

## 2025-05-23 DIAGNOSIS — K62.5 HEMORRHAGE OF ANUS: ICD-10-CM

## 2025-05-23 LAB
25(OH)D3 SERPL-MCNC: 33 NG/ML (ref 30–100)
BASOPHILS # BLD AUTO: 0.05 10*3/MM3 (ref 0–0.2)
BASOPHILS NFR BLD AUTO: 1.2 % (ref 0–1.5)
CRP SERPL-MCNC: <0.3 MG/DL (ref 0–0.5)
DEPRECATED RDW RBC AUTO: 48.8 FL (ref 37–54)
EOSINOPHIL # BLD AUTO: 0.09 10*3/MM3 (ref 0–0.4)
EOSINOPHIL NFR BLD AUTO: 2.1 % (ref 0.3–6.2)
ERYTHROCYTE [DISTWIDTH] IN BLOOD BY AUTOMATED COUNT: 17.6 % (ref 12.3–15.4)
FERRITIN SERPL-MCNC: 8.63 NG/ML (ref 13–150)
HBV SURFACE AB SER RIA-ACNC: REACTIVE
HBV SURFACE AG SERPL QL IA: NORMAL
HCT VFR BLD AUTO: 35.8 % (ref 34–46.6)
HGB BLD-MCNC: 10.3 G/DL (ref 12–15.9)
IMM GRANULOCYTES # BLD AUTO: 0.01 10*3/MM3 (ref 0–0.05)
IMM GRANULOCYTES NFR BLD AUTO: 0.2 % (ref 0–0.5)
IRON 24H UR-MRATE: 21 MCG/DL (ref 37–145)
IRON SATN MFR SERPL: 3 % (ref 20–50)
LYMPHOCYTES # BLD AUTO: 0.66 10*3/MM3 (ref 0.7–3.1)
LYMPHOCYTES NFR BLD AUTO: 15.8 % (ref 19.6–45.3)
MCH RBC QN AUTO: 22.2 PG (ref 26.6–33)
MCHC RBC AUTO-ENTMCNC: 28.8 G/DL (ref 31.5–35.7)
MCV RBC AUTO: 77.2 FL (ref 79–97)
MONOCYTES # BLD AUTO: 0.32 10*3/MM3 (ref 0.1–0.9)
MONOCYTES NFR BLD AUTO: 7.6 % (ref 5–12)
NEUTROPHILS NFR BLD AUTO: 3.06 10*3/MM3 (ref 1.7–7)
NEUTROPHILS NFR BLD AUTO: 73.1 % (ref 42.7–76)
NRBC BLD AUTO-RTO: 0 /100 WBC (ref 0–0.2)
PLATELET # BLD AUTO: 374 10*3/MM3 (ref 140–450)
PMV BLD AUTO: 9.7 FL (ref 6–12)
RBC # BLD AUTO: 4.64 10*6/MM3 (ref 3.77–5.28)
TIBC SERPL-MCNC: 755 MCG/DL (ref 298–536)
TRANSFERRIN SERPL-MCNC: 507 MG/DL (ref 200–360)
WBC NRBC COR # BLD AUTO: 4.19 10*3/MM3 (ref 3.4–10.8)

## 2025-05-23 PROCEDURE — 82728 ASSAY OF FERRITIN: CPT

## 2025-05-23 PROCEDURE — 36415 COLL VENOUS BLD VENIPUNCTURE: CPT

## 2025-05-23 PROCEDURE — 85025 COMPLETE CBC W/AUTO DIFF WBC: CPT

## 2025-05-23 PROCEDURE — 83540 ASSAY OF IRON: CPT

## 2025-05-23 PROCEDURE — 86704 HEP B CORE ANTIBODY TOTAL: CPT

## 2025-05-23 PROCEDURE — 86140 C-REACTIVE PROTEIN: CPT

## 2025-05-23 PROCEDURE — 86480 TB TEST CELL IMMUN MEASURE: CPT

## 2025-05-23 PROCEDURE — 84466 ASSAY OF TRANSFERRIN: CPT

## 2025-05-23 PROCEDURE — 87340 HEPATITIS B SURFACE AG IA: CPT

## 2025-05-23 PROCEDURE — 82306 VITAMIN D 25 HYDROXY: CPT

## 2025-05-23 PROCEDURE — 86706 HEP B SURFACE ANTIBODY: CPT

## 2025-05-24 LAB — HBV CORE AB SERPL QL IA: NEGATIVE

## 2025-05-27 DIAGNOSIS — T45.4X5D ADVERSE EFFECT OF IRON, SUBSEQUENT ENCOUNTER: ICD-10-CM

## 2025-05-27 DIAGNOSIS — D50.0 IRON DEFICIENCY ANEMIA DUE TO CHRONIC BLOOD LOSS: ICD-10-CM

## 2025-05-27 DIAGNOSIS — E03.4 HYPOTHYROIDISM DUE TO ACQUIRED ATROPHY OF THYROID: ICD-10-CM

## 2025-05-27 DIAGNOSIS — E55.9 VITAMIN D DEFICIENCY: ICD-10-CM

## 2025-05-27 DIAGNOSIS — Z00.00 WELL ADULT EXAM: Primary | ICD-10-CM

## 2025-05-28 LAB
GAMMA INTERFERON BACKGROUND BLD IA-ACNC: 0.04 IU/ML
M TB IFN-G BLD-IMP: NEGATIVE
M TB IFN-G CD4+ BCKGRND COR BLD-ACNC: 0.04 IU/ML
M TB IFN-G CD4+CD8+ BCKGRND COR BLD-ACNC: 0.04 IU/ML
MITOGEN IGNF BCKGRD COR BLD-ACNC: >10 IU/ML
QUANTIFERON INCUBATION: NORMAL
SERVICE CMNT-IMP: NORMAL

## 2025-05-28 RX ORDER — HYDROCORTISONE SODIUM SUCCINATE 100 MG/2ML
100 INJECTION INTRAMUSCULAR; INTRAVENOUS AS NEEDED
OUTPATIENT
Start: 2025-05-28

## 2025-05-28 RX ORDER — FAMOTIDINE 10 MG/ML
20 INJECTION, SOLUTION INTRAVENOUS AS NEEDED
OUTPATIENT
Start: 2025-05-28

## 2025-05-28 RX ORDER — DIPHENHYDRAMINE HYDROCHLORIDE 50 MG/ML
50 INJECTION, SOLUTION INTRAMUSCULAR; INTRAVENOUS AS NEEDED
OUTPATIENT
Start: 2025-05-28

## 2025-05-28 RX ORDER — SODIUM CHLORIDE 9 MG/ML
20 INJECTION, SOLUTION INTRAVENOUS ONCE
OUTPATIENT
Start: 2025-05-28

## 2025-05-28 RX ORDER — ACETAMINOPHEN 325 MG/1
650 TABLET ORAL ONCE
OUTPATIENT
Start: 2025-05-28

## 2025-06-06 ENCOUNTER — HOSPITAL ENCOUNTER (OUTPATIENT)
Dept: INFUSION THERAPY | Facility: HOSPITAL | Age: 35
Discharge: HOME | End: 2025-06-06
Payer: COMMERCIAL

## 2025-06-06 VITALS
HEART RATE: 63 BPM | TEMPERATURE: 97.1 F | RESPIRATION RATE: 18 BRPM | DIASTOLIC BLOOD PRESSURE: 83 MMHG | OXYGEN SATURATION: 100 % | SYSTOLIC BLOOD PRESSURE: 129 MMHG

## 2025-06-06 DIAGNOSIS — T45.4X5D ADVERSE EFFECT OF IRON, SUBSEQUENT ENCOUNTER: Primary | ICD-10-CM

## 2025-06-06 DIAGNOSIS — D50.0 IRON DEFICIENCY ANEMIA DUE TO CHRONIC BLOOD LOSS: ICD-10-CM

## 2025-06-06 PROCEDURE — 25010000002 FERUMOXYTOL 510 MG/17ML SOLUTION 17 ML VIAL: Performed by: INTERNAL MEDICINE

## 2025-06-06 PROCEDURE — 96374 THER/PROPH/DIAG INJ IV PUSH: CPT

## 2025-06-06 RX ORDER — HYDROCORTISONE SODIUM SUCCINATE 100 MG/2ML
100 INJECTION INTRAMUSCULAR; INTRAVENOUS AS NEEDED
Status: CANCELLED | OUTPATIENT
Start: 2025-06-13

## 2025-06-06 RX ORDER — FEXOFENADINE HCL AND PSEUDOEPHEDRINE HCL 180; 240 MG/1; MG/1
1 TABLET, EXTENDED RELEASE ORAL DAILY
COMMUNITY

## 2025-06-06 RX ORDER — FAMOTIDINE 10 MG/ML
20 INJECTION, SOLUTION INTRAVENOUS AS NEEDED
Status: CANCELLED | OUTPATIENT
Start: 2025-06-13

## 2025-06-06 RX ORDER — SODIUM CHLORIDE 9 MG/ML
20 INJECTION, SOLUTION INTRAVENOUS ONCE
Status: DISCONTINUED | OUTPATIENT
Start: 2025-06-06 | End: 2025-06-08 | Stop reason: HOSPADM

## 2025-06-06 RX ORDER — DIPHENHYDRAMINE HYDROCHLORIDE 50 MG/ML
50 INJECTION, SOLUTION INTRAMUSCULAR; INTRAVENOUS AS NEEDED
Status: DISCONTINUED | OUTPATIENT
Start: 2025-06-06 | End: 2025-06-08 | Stop reason: HOSPADM

## 2025-06-06 RX ORDER — ACETAMINOPHEN 325 MG/1
650 TABLET ORAL ONCE
Status: COMPLETED | OUTPATIENT
Start: 2025-06-06 | End: 2025-06-06

## 2025-06-06 RX ORDER — HYDROCORTISONE SODIUM SUCCINATE 100 MG/2ML
100 INJECTION INTRAMUSCULAR; INTRAVENOUS AS NEEDED
Status: DISCONTINUED | OUTPATIENT
Start: 2025-06-06 | End: 2025-06-08 | Stop reason: HOSPADM

## 2025-06-06 RX ORDER — ACETAMINOPHEN 325 MG/1
650 TABLET ORAL ONCE
Status: CANCELLED | OUTPATIENT
Start: 2025-06-13

## 2025-06-06 RX ORDER — DIPHENHYDRAMINE HYDROCHLORIDE 50 MG/ML
50 INJECTION, SOLUTION INTRAMUSCULAR; INTRAVENOUS AS NEEDED
Status: CANCELLED | OUTPATIENT
Start: 2025-06-13

## 2025-06-06 RX ORDER — SODIUM CHLORIDE 9 MG/ML
20 INJECTION, SOLUTION INTRAVENOUS ONCE
Status: CANCELLED | OUTPATIENT
Start: 2025-06-13

## 2025-06-06 RX ORDER — FAMOTIDINE 10 MG/ML
20 INJECTION, SOLUTION INTRAVENOUS AS NEEDED
Status: DISCONTINUED | OUTPATIENT
Start: 2025-06-06 | End: 2025-06-08 | Stop reason: HOSPADM

## 2025-06-06 RX ADMIN — ACETAMINOPHEN 650 MG: 325 TABLET ORAL at 13:53

## 2025-06-06 RX ADMIN — FERUMOXYTOL 510 MG: 510 INJECTION INTRAVENOUS at 14:36

## 2025-06-13 ENCOUNTER — HOSPITAL ENCOUNTER (OUTPATIENT)
Dept: INFUSION THERAPY | Facility: HOSPITAL | Age: 35
Discharge: HOME | End: 2025-06-13
Payer: COMMERCIAL

## 2025-06-13 VITALS
DIASTOLIC BLOOD PRESSURE: 83 MMHG | TEMPERATURE: 96.9 F | RESPIRATION RATE: 18 BRPM | HEART RATE: 59 BPM | OXYGEN SATURATION: 100 % | SYSTOLIC BLOOD PRESSURE: 115 MMHG

## 2025-06-13 DIAGNOSIS — T45.4X5D ADVERSE EFFECT OF IRON, SUBSEQUENT ENCOUNTER: Primary | ICD-10-CM

## 2025-06-13 DIAGNOSIS — D50.0 IRON DEFICIENCY ANEMIA DUE TO CHRONIC BLOOD LOSS: ICD-10-CM

## 2025-06-13 PROCEDURE — 25010000002 FERUMOXYTOL 510 MG/17ML SOLUTION 17 ML VIAL: Performed by: INTERNAL MEDICINE

## 2025-06-13 PROCEDURE — 96374 THER/PROPH/DIAG INJ IV PUSH: CPT

## 2025-06-13 RX ORDER — FAMOTIDINE 10 MG/ML
20 INJECTION, SOLUTION INTRAVENOUS AS NEEDED
Status: DISCONTINUED | OUTPATIENT
Start: 2025-06-13 | End: 2025-06-15 | Stop reason: HOSPADM

## 2025-06-13 RX ORDER — DIPHENHYDRAMINE HYDROCHLORIDE 50 MG/ML
50 INJECTION, SOLUTION INTRAMUSCULAR; INTRAVENOUS AS NEEDED
Status: DISCONTINUED | OUTPATIENT
Start: 2025-06-13 | End: 2025-06-15 | Stop reason: HOSPADM

## 2025-06-13 RX ORDER — DIPHENHYDRAMINE HYDROCHLORIDE 50 MG/ML
50 INJECTION, SOLUTION INTRAMUSCULAR; INTRAVENOUS AS NEEDED
Status: CANCELLED | OUTPATIENT
Start: 2025-06-13

## 2025-06-13 RX ORDER — ACETAMINOPHEN 325 MG/1
650 TABLET ORAL ONCE
Status: COMPLETED | OUTPATIENT
Start: 2025-06-13 | End: 2025-06-13

## 2025-06-13 RX ORDER — SODIUM CHLORIDE 9 MG/ML
20 INJECTION, SOLUTION INTRAVENOUS ONCE
Status: DISCONTINUED | OUTPATIENT
Start: 2025-06-13 | End: 2025-06-15 | Stop reason: HOSPADM

## 2025-06-13 RX ORDER — FAMOTIDINE 10 MG/ML
20 INJECTION, SOLUTION INTRAVENOUS AS NEEDED
Status: CANCELLED | OUTPATIENT
Start: 2025-06-13

## 2025-06-13 RX ORDER — ACETAMINOPHEN 325 MG/1
650 TABLET ORAL ONCE
Status: CANCELLED | OUTPATIENT
Start: 2025-06-13

## 2025-06-13 RX ORDER — SODIUM CHLORIDE 9 MG/ML
20 INJECTION, SOLUTION INTRAVENOUS ONCE
Status: CANCELLED | OUTPATIENT
Start: 2025-06-13

## 2025-06-13 RX ORDER — HYDROCORTISONE SODIUM SUCCINATE 100 MG/2ML
100 INJECTION INTRAMUSCULAR; INTRAVENOUS AS NEEDED
Status: CANCELLED | OUTPATIENT
Start: 2025-06-13

## 2025-06-13 RX ORDER — HYDROCORTISONE SODIUM SUCCINATE 100 MG/2ML
100 INJECTION INTRAMUSCULAR; INTRAVENOUS AS NEEDED
Status: DISCONTINUED | OUTPATIENT
Start: 2025-06-13 | End: 2025-06-15 | Stop reason: HOSPADM

## 2025-06-13 RX ADMIN — FERUMOXYTOL 510 MG: 510 INJECTION INTRAVENOUS at 13:48

## 2025-06-13 RX ADMIN — ACETAMINOPHEN 650 MG: 325 TABLET ORAL at 13:24

## 2025-07-01 ENCOUNTER — SPECIALTY PHARMACY (OUTPATIENT)
Dept: PHARMACY | Facility: TELEHEALTH | Age: 35
End: 2025-07-01
Payer: COMMERCIAL

## 2025-07-02 ENCOUNTER — SPECIALTY PHARMACY (OUTPATIENT)
Dept: PHARMACY | Facility: TELEHEALTH | Age: 35
End: 2025-07-02
Payer: COMMERCIAL

## 2025-07-02 NOTE — PROGRESS NOTES
Specialty Pharmacy Patient Management Program  Reassessment     Doreen Stephenson is a 35 y.o. female with crohn's and enrolled in the Patient Management program offered by [facility]. A follow-up outreach was conducted, including assessment of continued therapy appropriateness, medication adherence, and side effect incidence and management for skyrizi.     Changes to Insurance Coverage or Financial Support  none    Relevant Past Medical History and Comorbidities  Relevant medical history and concomitant health conditions were discussed with the patient. The patient's chart has been reviewed for relevant past medical history and comorbid health conditions and updated as necessary.   Past Medical History:   Diagnosis Date    Anemia     Anxiety     Crohn's disease     Depression     Incisional hernia     PONV (postoperative nausea and vomiting)     USED PATCH PREVIOUSLY    Ventral hernia      Social History     Socioeconomic History    Marital status: Single   Tobacco Use    Smoking status: Never    Smokeless tobacco: Never   Vaping Use    Vaping status: Never Used   Substance and Sexual Activity    Alcohol use: No    Drug use: No    Sexual activity: Defer     Problem list reviewed by Evan Maria RPH on 7/2/2025 at  9:51 AM    Allergies  Known allergies and reactions were discussed with the patient. The patient's chart has been reviewed for allergy information and updated as necessary.   Allergies   Allergen Reactions    Morphine Rash     RASH AROUND IV SITE     Allergies reviewed by Evan Maria RPH on 7/2/2025 at  9:51 AM    Relevant Laboratory Values  Lab Results   Component Value Date    GLUCOSE 79 12/13/2024    CALCIUM 8.4 (L) 12/13/2024     12/13/2024    K 3.6 12/13/2024    CO2 24.3 12/13/2024     (H) 12/13/2024    BUN 9 12/13/2024    CREATININE 0.68 12/13/2024    BCR 13.2 12/13/2024    ANIONGAP 8.7 12/13/2024     Lab Results   Component Value Date    WBC 4.19  05/23/2025    HGB 10.3 (L) 05/23/2025    HCT 35.8 05/23/2025    MCV 77.2 (L) 05/23/2025     05/23/2025    INR 0.96 10/13/2022     Lab Value Review  The above lab values have been reviewed; the following specialty medication(s) dose adjustment(s) are recommended: none.    Current Medication List  This medication list has been reviewed with the patient and evaluated for any interactions or necessary modifications/recommendations, and updated to include all prescription medications, OTC medications, and supplements the patient is currently taking. This list reflects what is contained in the patient's profile, which has also been marked as reviewed to communicate to other providers it is the most up to date version of the patient's current medication therapy.     Current Outpatient Medications:     Cyanocobalamin (VITAMIN B 12 PO), Take 1 tablet by mouth Daily., Disp: , Rfl:     dicyclomine (BENTYL) 10 MG capsule, Take 1 capsule by mouth 3 (Three) Times a Day As Needed., Disp: 90 capsule, Rfl: 5    fexofenadine-pseudoephedrine (ALLEGRA-D 24) 180-240 MG per 24 hr tablet, Take 1 tablet by mouth Daily., Disp: , Rfl:     levothyroxine (SYNTHROID, LEVOTHROID) 25 MCG tablet, Take 1 tablet by mouth Daily., Disp: 90 tablet, Rfl: 1    Risankizumab-rzaa (Skyrizi) 180 MG/1.2ML solution cartridge cartridge, Inject 1.2 mL under the skin into the appropriate area as directed Every 8 (Eight) Weeks., Disp: 1.2 mL, Rfl: 1    venlafaxine XR (EFFEXOR-XR) 75 MG 24 hr capsule, Take 3 capsules by mouth Daily., Disp: 270 capsule, Rfl: 1    VITAMIN D PO, Take 4,000 Units by mouth Daily., Disp: , Rfl:   No current facility-administered medications for this visit.  Medicines reviewed by Evan Maria RPH on 7/2/2025 at  9:51 AM    Drug Interactions  none     Adverse Drug Reactions  Medication tolerability: Tolerating with no to minimal ADRs  Medication plan: Continue therapy with normal follow-up  Plan for ADR Management:  none    Hospitalizations and Urgent Care Since Last Assessment  Hospitalizations or Admissions: none  ED Visits: none  Urgent Office Visits: none     Adherence, Self-Administration, and Current Therapy Problems  Adherence related to the patient's specialty therapy was discussed with the patient. The Adherence segment of this outreach has been reviewed and updated.     Adherence Questions  Linked Medication(s) Assessed: Risankizumab-rzaa (SKYRIZI)  On average, how many doses/injections does the patient miss per month?: 0  What are the identified reasons for non-adherence or missed doses? : no problems identified  What is the estimated medication adherence level?: %  Based on the patient/caregiver response and refill history, does this patient require an MTP to track adherence improvements?: no    Additional Barriers to Patient Self-Administration: none  Methods for Supporting Patient Self-Administration: none    Open Medication Therapy Problems  No medication therapy recommendations to display    Goals of Therapy  Goals related to the patient's specialty therapy were discussed with the patient. The Patient Goals segment of this outreach has been reviewed and updated.   Goals Addressed Today        Specialty Pharmacy General Goal      At least a 50% reduction in symptoms and endoscopic healing.              Progress Toward Meeting Patient-Identified Goals of Therapy: On Track  New Patient-Identified Goals, If Applicable:     Progress Toward Meeting Clinical Goals or Therapeutic Targets: On Track  New Clinical Goals or Therapeutic Targets, If Applicable:     Quality of Life Assessment   Quality of Life related to the patient's enrollment in the patient management program and services provided was discussed with the patient. The QOL segment of this outreach has been reviewed and updated.  Quality of Life Improvement Scale: 9-A good deal better    Reassessment Plan & Follow-Up  Medication Therapy Changes:  none  Additional Plans, Therapy Recommendations, or Therapy Problems to Be Addressed: none   Pharmacist to perform regular reassessments no more than (6) months from the previous assessment.  Care Coordinator to set up future refill outreaches, coordinate prescription delivery, and escalate clinical questions to pharmacist.     Attestation  I attest the patient was actively involved in and has agreed to the above plan of care. I attest that the specialty medication(s) addressed above are appropriate for the patient based on my reassessment. If the prescribed therapy is at any point deemed not appropriate based on the current or future assessments, a consultation will be initiated with the patient's specialty care provider to determine the best course of action. The revised plan of therapy will be documented along with any required assessments and/or additional patient education provided.     Electronically signed by Evan Maria RPH, 07/02/25, 9:51 AM EDT.

## 2025-07-02 NOTE — PROGRESS NOTES
Specialty Pharmacy Patient Management Program  Refill Outreach     Doreen was contacted today regarding refills of their medication(s).    Refill Questions      Flowsheet Row Most Recent Value   Changes to allergies? No   Changes to medications? No   New conditions or infections since last clinic visit No   Unplanned office visit, urgent care, ED, or hospital admission in the last 4 weeks  No   How does patient/caregiver feel medication is working? Very good   Financial problems or insurance changes  No   Since the previous refill, were any specialty medication doses or scheduled injections missed or delayed?  No  [Next dose due 7/5/25]   Does this patient require a clinical escalation to a pharmacist? No            Delivery Questions      Flowsheet Row Most Recent Value   Delivery method UPS   Delivery address verified with patient/caregiver? Yes   Delivery address Home   Other address preferred N/A   Number of medications in delivery 1   Medication(s) being filled and delivered Risankizumab-rzaa (Skyrizi)   Doses left of specialty medications 0   Copay verified? Yes   Copay amount $0.00   Copay form of payment No copayment ($0)   Delivery Date Selection 07/03/25   Signature Required No                 Follow-up: 48 day(s)     Eve Jimenez, Pharmacy Technician  7/2/2025  09:11 EDT

## 2025-08-26 ENCOUNTER — SPECIALTY PHARMACY (OUTPATIENT)
Dept: PHARMACY | Facility: TELEHEALTH | Age: 35
End: 2025-08-26
Payer: COMMERCIAL

## (undated) DEVICE — STPLR SKIN VISISTAT WD 35CT

## (undated) DEVICE — SENSR O2 OXIMAX FNGR A/ 18IN NONSTR

## (undated) DEVICE — PENCL E/S HNDSWCH ROCKR CB

## (undated) DEVICE — DRSNG SURESITE WNDW 4X4.5

## (undated) DEVICE — Device

## (undated) DEVICE — 3M™ STERI-STRIP™ COMPOUND BENZOIN TINCTURE 40 BAGS/CARTON 4 CARTONS/CASE C1544: Brand: 3M™ STERI-STRIP™

## (undated) DEVICE — APPL CHLORAPREP W/TINT 26ML ORNG

## (undated) DEVICE — ENCORE® LATEX ORTHO SIZE 7.5, STERILE LATEX POWDER-FREE SURGICAL GLOVE: Brand: ENCORE

## (undated) DEVICE — ANTIBACTERIAL UNDYED BRAIDED (POLYGLACTIN 910), SYNTHETIC ABSORBABLE SUTURE: Brand: COATED VICRYL

## (undated) DEVICE — IRRIGATOR BULB ASEPTO 60CC STRL

## (undated) DEVICE — AMD ANTIMICROBIAL GAUZE SPONGES,12 PLY USP TYPE VII, 0.2% POLYHEXAMETHYLENE BIGUANIDE HCI (PHMB): Brand: CURITY

## (undated) DEVICE — SYR LUERLOK 20CC

## (undated) DEVICE — NDL HYPO PRECISIONGLIDE REG 25G 1 1/2

## (undated) DEVICE — JACKSON-PRATT 100CC BULB RESERVOIR: Brand: CARDINAL HEALTH

## (undated) DEVICE — 3M(TM) TEGADERM(TM) IV TRANSPARENT FILM DRESSING WITH BORDER 1655: Brand: 3M™ TEGADERM™

## (undated) DEVICE — CANN O2 ETCO2 FITS ALL CONN CO2 SMPL A/ 7IN DISP LF

## (undated) DEVICE — SUT PDS O CT1 CR/8 18IN Z740D

## (undated) DEVICE — 3M™ STERI-STRIP™ REINFORCED ADHESIVE SKIN CLOSURES, R1547, 1/2 IN X 4 IN (12 MM X 100 MM), 6 STRIPS/ENVELOPE: Brand: 3M™ STERI-STRIP™

## (undated) DEVICE — PK PROC MAJ 40

## (undated) DEVICE — LN SMPL CO2 SHTRM SD STREAM W/M LUER

## (undated) DEVICE — DRSNG WND GZ PAD BORDERED 4X8IN STRL

## (undated) DEVICE — SINGLE-USE BIOPSY FORCEPS: Brand: RADIAL JAW 4

## (undated) DEVICE — TUBING, SUCTION, 1/4" X 10', STRAIGHT: Brand: MEDLINE

## (undated) DEVICE — ADAPT CLN BIOGUARD AIR/H2O DISP

## (undated) DEVICE — SPNG GZ WOVN 4X4IN 12PLY 10/BX STRL

## (undated) DEVICE — KT ORCA ORCAPOD DISP STRL